# Patient Record
Sex: MALE | Race: WHITE | NOT HISPANIC OR LATINO | Employment: FULL TIME | URBAN - METROPOLITAN AREA
[De-identification: names, ages, dates, MRNs, and addresses within clinical notes are randomized per-mention and may not be internally consistent; named-entity substitution may affect disease eponyms.]

---

## 2018-12-26 ENCOUNTER — RECORDS - HEALTHEAST (OUTPATIENT)
Dept: LAB | Facility: CLINIC | Age: 23
End: 2018-12-26

## 2018-12-26 LAB
BASOPHILS # BLD AUTO: 0.1 THOU/UL (ref 0–0.2)
BASOPHILS NFR BLD AUTO: 1 % (ref 0–2)
EOSINOPHIL # BLD AUTO: 0.3 THOU/UL (ref 0–0.4)
EOSINOPHIL NFR BLD AUTO: 2 % (ref 0–6)
ERYTHROCYTE [DISTWIDTH] IN BLOOD BY AUTOMATED COUNT: 12.2 % (ref 11–14.5)
HCT VFR BLD AUTO: 45 % (ref 40–54)
HGB BLD-MCNC: 14.9 G/DL (ref 14–18)
LYMPHOCYTES # BLD AUTO: 2 THOU/UL (ref 0.8–4.4)
LYMPHOCYTES NFR BLD AUTO: 18 % (ref 20–40)
MCH RBC QN AUTO: 29.2 PG (ref 27–34)
MCHC RBC AUTO-ENTMCNC: 33.1 G/DL (ref 32–36)
MCV RBC AUTO: 88 FL (ref 80–100)
MONOCYTES # BLD AUTO: 1 THOU/UL (ref 0–0.9)
MONOCYTES NFR BLD AUTO: 9 % (ref 2–10)
NEUTROPHILS # BLD AUTO: 7.8 THOU/UL (ref 2–7.7)
NEUTROPHILS NFR BLD AUTO: 71 % (ref 50–70)
PLATELET # BLD AUTO: 287 THOU/UL (ref 140–440)
PMV BLD AUTO: 8.7 FL (ref 8.5–12.5)
RBC # BLD AUTO: 5.11 MILL/UL (ref 4.4–6.2)
WBC: 11.2 THOU/UL (ref 4–11)

## 2018-12-28 LAB — BACTERIA SPEC CULT: NORMAL

## 2019-03-05 ENCOUNTER — RECORDS - HEALTHEAST (OUTPATIENT)
Dept: LAB | Facility: CLINIC | Age: 24
End: 2019-03-05

## 2019-03-05 LAB
HGB BLD-MCNC: 14.2 G/DL (ref 14–18)
POTASSIUM BLD-SCNC: 4.2 MMOL/L (ref 3.5–5)

## 2022-01-24 ENCOUNTER — OFFICE VISIT (OUTPATIENT)
Dept: FAMILY MEDICINE | Facility: CLINIC | Age: 27
End: 2022-01-24
Payer: COMMERCIAL

## 2022-01-24 VITALS
RESPIRATION RATE: 16 BRPM | SYSTOLIC BLOOD PRESSURE: 146 MMHG | DIASTOLIC BLOOD PRESSURE: 78 MMHG | HEIGHT: 75 IN | OXYGEN SATURATION: 99 % | BODY MASS INDEX: 34.07 KG/M2 | TEMPERATURE: 97.1 F | WEIGHT: 274 LBS | HEART RATE: 90 BPM

## 2022-01-24 DIAGNOSIS — Z11.3 SCREEN FOR STD (SEXUALLY TRANSMITTED DISEASE): ICD-10-CM

## 2022-01-24 DIAGNOSIS — Z13.6 ENCOUNTER FOR LIPID SCREENING FOR CARDIOVASCULAR DISEASE: ICD-10-CM

## 2022-01-24 DIAGNOSIS — M25.50 ARTHRALGIA, UNSPECIFIED JOINT: ICD-10-CM

## 2022-01-24 DIAGNOSIS — Z13.220 ENCOUNTER FOR LIPID SCREENING FOR CARDIOVASCULAR DISEASE: ICD-10-CM

## 2022-01-24 DIAGNOSIS — Z00.00 HEALTH CARE MAINTENANCE: ICD-10-CM

## 2022-01-24 DIAGNOSIS — Z11.59 NEED FOR HEPATITIS C SCREENING TEST: ICD-10-CM

## 2022-01-24 DIAGNOSIS — Z23 NEED FOR 23-POLYVALENT PNEUMOCOCCAL POLYSACCHARIDE VACCINE: ICD-10-CM

## 2022-01-24 DIAGNOSIS — Z87.39 HISTORY OF GOUT: ICD-10-CM

## 2022-01-24 DIAGNOSIS — Z13.1 SCREENING FOR DIABETES MELLITUS: ICD-10-CM

## 2022-01-24 DIAGNOSIS — Z71.89 ADVANCED DIRECTIVES, COUNSELING/DISCUSSION: ICD-10-CM

## 2022-01-24 DIAGNOSIS — Z23 NEED FOR COVID-19 VACCINE: ICD-10-CM

## 2022-01-24 DIAGNOSIS — J45.40 MODERATE PERSISTENT ASTHMA WITHOUT COMPLICATION: ICD-10-CM

## 2022-01-24 DIAGNOSIS — R06.09 DYSPNEA ON EXERTION: ICD-10-CM

## 2022-01-24 DIAGNOSIS — Z23 NEED FOR TDAP VACCINATION: ICD-10-CM

## 2022-01-24 DIAGNOSIS — Z11.4 SCREENING FOR HIV (HUMAN IMMUNODEFICIENCY VIRUS): ICD-10-CM

## 2022-01-24 DIAGNOSIS — Z23 NEED FOR PROPHYLACTIC VACCINATION AND INOCULATION AGAINST INFLUENZA: ICD-10-CM

## 2022-01-24 DIAGNOSIS — R03.0 ELEVATED BLOOD PRESSURE READING WITHOUT DIAGNOSIS OF HYPERTENSION: ICD-10-CM

## 2022-01-24 DIAGNOSIS — Z00.00 ROUTINE HISTORY AND PHYSICAL EXAMINATION OF ADULT: Primary | ICD-10-CM

## 2022-01-24 DIAGNOSIS — Z82.69 FAMILY HISTORY OF GOUT: ICD-10-CM

## 2022-01-24 DIAGNOSIS — R41.840 DISTURBED CONCENTRATION: ICD-10-CM

## 2022-01-24 LAB
BASOPHILS # BLD AUTO: 0.1 10E3/UL (ref 0–0.2)
BASOPHILS NFR BLD AUTO: 1 %
EOSINOPHIL # BLD AUTO: 0.7 10E3/UL (ref 0–0.7)
EOSINOPHIL NFR BLD AUTO: 10 %
ERYTHROCYTE [DISTWIDTH] IN BLOOD BY AUTOMATED COUNT: 12.5 % (ref 10–15)
HBA1C MFR BLD: 5.4 % (ref 0–5.6)
HCT VFR BLD AUTO: 41.5 % (ref 40–53)
HCV AB SERPL QL IA: NONREACTIVE
HGB BLD-MCNC: 13.9 G/DL (ref 13.3–17.7)
HIV 1+2 AB+HIV1 P24 AG SERPL QL IA: NONREACTIVE
IMM GRANULOCYTES # BLD: 0 10E3/UL
IMM GRANULOCYTES NFR BLD: 0 %
LYMPHOCYTES # BLD AUTO: 2.3 10E3/UL (ref 0.8–5.3)
LYMPHOCYTES NFR BLD AUTO: 34 %
MCH RBC QN AUTO: 28.8 PG (ref 26.5–33)
MCHC RBC AUTO-ENTMCNC: 33.5 G/DL (ref 31.5–36.5)
MCV RBC AUTO: 86 FL (ref 78–100)
MONOCYTES # BLD AUTO: 0.6 10E3/UL (ref 0–1.3)
MONOCYTES NFR BLD AUTO: 9 %
NEUTROPHILS # BLD AUTO: 3.1 10E3/UL (ref 1.6–8.3)
NEUTROPHILS NFR BLD AUTO: 46 %
PLATELET # BLD AUTO: 275 10E3/UL (ref 150–450)
RBC # BLD AUTO: 4.82 10E6/UL (ref 4.4–5.9)
WBC # BLD AUTO: 6.7 10E3/UL (ref 4–11)

## 2022-01-24 PROCEDURE — 83036 HEMOGLOBIN GLYCOSYLATED A1C: CPT | Performed by: FAMILY MEDICINE

## 2022-01-24 PROCEDURE — 99214 OFFICE O/P EST MOD 30 MIN: CPT | Mod: 25 | Performed by: FAMILY MEDICINE

## 2022-01-24 PROCEDURE — 36415 COLL VENOUS BLD VENIPUNCTURE: CPT | Performed by: FAMILY MEDICINE

## 2022-01-24 PROCEDURE — 80061 LIPID PANEL: CPT | Performed by: FAMILY MEDICINE

## 2022-01-24 PROCEDURE — 87389 HIV-1 AG W/HIV-1&-2 AB AG IA: CPT | Performed by: FAMILY MEDICINE

## 2022-01-24 PROCEDURE — 82043 UR ALBUMIN QUANTITATIVE: CPT | Performed by: FAMILY MEDICINE

## 2022-01-24 PROCEDURE — 90732 PPSV23 VACC 2 YRS+ SUBQ/IM: CPT | Performed by: FAMILY MEDICINE

## 2022-01-24 PROCEDURE — 99395 PREV VISIT EST AGE 18-39: CPT | Mod: 25 | Performed by: FAMILY MEDICINE

## 2022-01-24 PROCEDURE — 86803 HEPATITIS C AB TEST: CPT | Performed by: FAMILY MEDICINE

## 2022-01-24 PROCEDURE — 90686 IIV4 VACC NO PRSV 0.5 ML IM: CPT | Performed by: FAMILY MEDICINE

## 2022-01-24 PROCEDURE — 84550 ASSAY OF BLOOD/URIC ACID: CPT | Performed by: FAMILY MEDICINE

## 2022-01-24 PROCEDURE — 90471 IMMUNIZATION ADMIN: CPT | Performed by: FAMILY MEDICINE

## 2022-01-24 PROCEDURE — 90472 IMMUNIZATION ADMIN EACH ADD: CPT | Performed by: FAMILY MEDICINE

## 2022-01-24 PROCEDURE — 80050 GENERAL HEALTH PANEL: CPT | Performed by: FAMILY MEDICINE

## 2022-01-24 RX ORDER — ALBUTEROL SULFATE 90 UG/1
2 AEROSOL, METERED RESPIRATORY (INHALATION) EVERY 6 HOURS PRN
Qty: 18 G | Refills: 0 | Status: SHIPPED | OUTPATIENT
Start: 2022-01-24 | End: 2022-02-18

## 2022-01-24 RX ORDER — ALBUTEROL SULFATE 90 UG/1
2 AEROSOL, METERED RESPIRATORY (INHALATION) EVERY 6 HOURS
COMMUNITY
End: 2022-01-24

## 2022-01-24 RX ORDER — INDOMETHACIN 25 MG/1
25 CAPSULE ORAL PRN
COMMUNITY
End: 2024-03-18

## 2022-01-24 ASSESSMENT — ENCOUNTER SYMPTOMS
ABDOMINAL PAIN: 0
NAUSEA: 0
JOINT SWELLING: 0
EYE PAIN: 0
DIARRHEA: 0
SHORTNESS OF BREATH: 1
MYALGIAS: 0
HEMATOCHEZIA: 0
HEADACHES: 0
SORE THROAT: 0
FREQUENCY: 0
DIZZINESS: 0
ARTHRALGIAS: 1
DYSURIA: 0
PARESTHESIAS: 0
HEMATURIA: 0
CONSTIPATION: 0
PALPITATIONS: 0
COUGH: 0
WEAKNESS: 0
FEVER: 0
NERVOUS/ANXIOUS: 0
CHILLS: 0
HEARTBURN: 0

## 2022-01-24 ASSESSMENT — MIFFLIN-ST. JEOR: SCORE: 2303.49

## 2022-01-24 ASSESSMENT — ASTHMA QUESTIONNAIRES: ACT_TOTALSCORE: 13

## 2022-01-24 NOTE — PATIENT INSTRUCTIONS
Good to meet you today   Seen for preventive health and additional concerns today   Self testicular check regularly   Labs today and will make further recommendations once reviewed  Health care maintenance reviewed.  Consider working on healthcare directives.  Flu shot due and given today.  Pneumovax given given today.  Clarify when last had Tdap and we can give at next appointment if needed.  Covid vaccine x3 up-to-date.    For history of asthma uncontrolled as ran out of controller meds 6 months ago with shortness of breath with activity limiting exercise, resume generic Advair/Wixela 250/51 puff twice a day.  Recheck asthma in 4 weeks if not better can do lung function test and refer to pulmonary.  If insurance does not cover above med let us know and asked them which one is covered.  Avoid vaping marijuana as this can be dangerous to your lungs.    Elevated blood pressure could be whitecoat hypertension could be due to weight.  Recommend low-salt diet, limiting alcohol to 1 drink a day no more than 7 total a week, 10% of total body weight loss, increasing aerobic activity, getting a home blood pressure machine and checking it at different times different days and calling us if it is consistently more than 130 x 80.    BMI 34.  Discussed diet, exercise weight loss, avoiding marijuana as much as possible as this can increase weight gain over time    History of gout diagnosed clinically based on toe pain in the past and family history of gout in dad.  Recent joint pain resolved.  Given frequent episodes in recent past you have made some lifestyle changes which is excellent.  Continue with low red meat, no game meat, no shellfish, decrease alcohol and will check uric acid levels and go from there.  If quite elevated consider a daily lowering medication.  Would avoid as needed prednisone as much as possible given long-term side effect can use indomethacin you have at home as needed.  Always try to use the lowest dose  for the shortest duration of time as Indocin like other NSAIDs increases risk of GI bleeding and heart attack.  Other option could be colchicine.  We can also consider referral to rheumatologist if necessary.    History of trouble focusing now affecting job referred to a psychologist for diagnostic evaluation of ADHD.  Avoid all marijuana prior to evaluation so they can get an unbiased evaluation.  Based on results can make recommendations for either skill base evaluation or referral to community psychiatrist for medication management.  Some medications like stimulants can make blood pressure worse so this might be tricky if your blood pressure remains elevated.    Return in 1 month for blood pressure check, asthma follow-up, follow-up from today including labs and in 1 year for preventive physical    Preventive Health Recommendations  Male Ages 26 - 39    Yearly exam:             See your health care provider every year in order to  o   Review health changes.   o   Discuss preventive care.    o   Review your medicines if your doctor has prescribed any.    You should be tested each year for STDs (sexually transmitted diseases), if you re at risk.     After age 35, talk to your provider about cholesterol testing. If you are at risk for heart disease, have your cholesterol tested at least every 5 years.     If you are at risk for diabetes, you should have a diabetes test (fasting glucose).  Shots: Get a flu shot each year. Get a tetanus shot every 10 years.     Nutrition:    Eat at least 5 servings of fruits and vegetables daily.     Eat whole-grain bread, whole-wheat pasta and brown rice instead of white grains and rice.     Get adequate Calcium and Vitamin D.     Lifestyle    Exercise for at least 150 minutes a week (30 minutes a day, 5 days a week). This will help you control your weight and prevent disease.     Limit alcohol to one drink per day.     No smoking.     Wear sunscreen to prevent skin cancer.     See  your dentist every six months for an exam and cleaning.     Patient Education     Testicular Self-Exam (TONY)  Testicular cancer is the most common form of cancer in men between the ages of 15 and 35. Most cases affect men under 55. It usually shows up as a painless lump in the testicle. The good news is that a simple monthly self-exam may help find trouble before it gets serious. When detected early, testicular cancer is almost 100% curable.       Doing your TONY  Do a TONY once a month, during or after a warm shower. The warm shower helps the scrotum relax. This makes the exam easier to do. Spend about 3 minutes to 5 minutes feeling for lumps, firm areas, or changes. If you do find a problem, don t panic. Call your healthcare provider and make an appointment.  Check the testicles  Hold your scrotum in the palm of your hand. Roll each testicle gently between the thumbs and fingers of both hands. Feel for changes in each testicle, one at a time.  Check the epididymis  The epididymis is a raised, rim-like structure responsible for storing sperm . It runs along the top and back of each testicle and often hurts when you press on it. Gently feel each epididymis for changes. A spermatocele is a cyst filled with fluid. It may be felt on or near the epididymis or testicle. Most often, spermatoceles are painless and not cancerous.   Check the vas deferens  The vas deferens is a little tube that runs up from the top of each testicle. A normal vas deferens feels like a firm piece of cooked spaghetti. Feel for changes above each testicle.  Professional screening  If you feel any abnormalities, tell your healthcare provider right away. In addition to doing your own TONY, you should also see your healthcare provider for regular checkups.  [employer] last reviewed this educational content on 6/1/2019 2000-2021 [employer]. All rights reserved. This information is not intended as a substitute for professional medical  care. Always follow your healthcare professional's instructions.

## 2022-01-24 NOTE — LETTER
My Asthma Action Plan    Name: David Martin   YOB: 1995  Date: 1/24/2022   My doctor: Mariana Sherman MD   My clinic: [facility]        My Control Medicine: Fluticasone propionate + salmeterol (Advair Diskus or Wixela Inhub) -  250/50 mcg 1 puff twice a day  My Rescue Medicine: Albuterol (Proair/Ventolin/Proventil HFA) 2-4 puffs EVERY 4 HOURS as needed. Use a spacer if recommended by your provider.   My Asthma Severity:   Moderate Persistent  Know your asthma triggers: exercise or sports               GREEN ZONE   Good Control    I feel good    No cough or wheeze    Can work, sleep and play without asthma symptoms       Take your asthma control medicine every day.     1. If exercise triggers your asthma, take your rescue medication    15 minutes before exercise or sports, and    During exercise if you have asthma symptoms  2. Spacer to use with inhaler: If you have a spacer, make sure to use it with your inhaler             YELLOW ZONE Getting Worse  I have ANY of these:    I do not feel good    Cough or wheeze    Chest feels tight    Wake up at night   1. Keep taking your Green Zone medications  2. Start taking your rescue medicine:    every 20 minutes for up to 1 hour. Then every 4 hours for 24-48 hours.  3. If you stay in the Yellow Zone for more than 12-24 hours, contact your doctor.  4. If you do not return to the Green Zone in 12-24 hours or you get worse, start taking your oral steroid medicine if prescribed by your provider.           RED ZONE Medical Alert - Get Help  I have ANY of these:    I feel awful    Medicine is not helping    Breathing getting harder    Trouble walking or talking    Nose opens wide to breathe       1. Take your rescue medicine NOW  2. If your provider has prescribed an oral steroid medicine, start taking it NOW  3. Call your doctor NOW  4. If you are still in the Red Zone after 20 minutes and you have not reached your doctor:    Take your  rescue medicine again and    Call 911 or go to the emergency room right away    See your regular doctor within 2 weeks of an Emergency Room or Urgent Care visit for follow-up treatment.          Annual Reminders:  Meet with Asthma Educator,  Flu Shot in the Fall, consider Pneumonia Vaccination for patients with asthma (aged 19 and older).    Pharmacy: [id number] - [place], [place] - [address] AT [facility] [address]    Electronically signed by Mariana Sherman MD   Date: 01/24/22                      Asthma Triggers  How To Control Things That Make Your Asthma Worse    Triggers are things that make your asthma worse.  Look at the list below to help you find your triggers and what you can do about them.  You can help prevent asthma flare-ups by staying away from your triggers.      Trigger                                                          What you can do   Cigarette Smoke  Tobacco smoke can make asthma worse. Do not allow smoking in your home, car or around you.  Be sure no one smokes at a child s day care or school.  If you smoke, ask your health care provider for ways to help you quit.  Ask family members to quit too.  Ask your health care provider for a referral to Quit Plan to help you quit smoking, or call 8-056-794-PLAN.     Colds, Flu, Bronchitis  These are common triggers of asthma. Wash your hands often.  Don t touch your eyes, nose or mouth.  Get a flu shot every year.     Dust Mites  These are tiny bugs that live in cloth or carpet. They are too small to see. Wash sheets and blankets in hot water every week.   Encase pillows and mattress in dust mite proof covers.  Avoid having carpet if you can. If you have carpet, vacuum weekly.   Use a dust mask and HEPA vacuum.   Pollen and Outdoor Mold  Some people are allergic to trees, grass, or weed pollen, or molds. Try to keep your windows closed.  Limit time out doors when pollen count is high.   Ask you health care provider about  taking medicine during allergy season.     Animal Dander  Some people are allergic to skin flakes, urine or saliva from pets with fur or feathers. Keep pets with fur or feathers out of your home.    If you can t keep the pet outdoors, then keep the pet out of your bedroom.  Keep the bedroom door closed.  Keep pets off cloth furniture and away from stuffed toys.     Mice, Rats, and Cockroaches   Some people are allergic to the waste from these pests.   Cover food and garbage.  Clean up spills and food crumbs.  Store grease in the refrigerator.   Keep food out of the bedroom.   Indoor Mold  This can be a trigger if your home has high moisture. Fix leaking faucets, pipes, or other sources of water.   Clean moldy surfaces.  Dehumidify basement if it is damp and smelly.   Smoke, Strong Odors, and Sprays  These can reduce air quality. Stay away from strong odors and sprays, such as perfume, powder, hair spray, paints, smoke incense, paint, cleaning products, candles and new carpet.   Exercise or Sports  Some people with asthma have this trigger. Be active!  Ask your doctor about taking medicine before sports or exercise to prevent symptoms.    Warm up for 5-10 minutes before and after sports or exercise.     Other Triggers of Asthma  Cold air:  Cover your nose and mouth with a scarf.  Sometimes laughing or crying can be a trigger.  Some medicines and food can trigger asthma.

## 2022-01-24 NOTE — RESULT ENCOUNTER NOTE
Results within acceptable limits.  -Hepatitis C antibody screen test shows no signs of a previous hepatitis C infection.  -HIV test is normal..

## 2022-01-24 NOTE — LETTER
February 5, 2022      Davidalbert Martin  [address] [facility] [address]  [place] [address]        Dear ,    We are writing to inform you of your test results.    Results within acceptable limits.  -Microalbumin (urine protein) test is normal.  ADVISE: rechecking this annually.    Resulted Orders   HIV Antigen Antibody Combo   Result Value Ref Range    HIV Antigen Antibody Combo Nonreactive Nonreactive      Comment:      HIV-1 p24 Ag & HIV-1/HIV-2 Ab Not Detected   Hepatitis C Screen Reflex to HCV RNA Quant and Genotype   Result Value Ref Range    Hepatitis C Antibody Nonreactive Nonreactive    Narrative    Assay performance characteristics have not been established for newborns, infants, and children.   Hemoglobin A1c   Result Value Ref Range    Hemoglobin A1C 5.4 0.0 - 5.6 %      Comment:      Normal <5.7%   Prediabetes 5.7-6.4%    Diabetes 6.5% or higher     Note: Adopted from ADA consensus guidelines.   Albumin Random Urine Quantitative with Creat Ratio   Result Value Ref Range    Creatinine Urine mg/dL 130 mg/dL    Albumin Urine mg/L 12 mg/L    Albumin Urine mg/g Cr 9.23 0.00 - 17.00 mg/g Cr   CBC with platelets and differential   Result Value Ref Range    WBC Count 6.7 4.0 - 11.0 10e3/uL    RBC Count 4.82 4.40 - 5.90 10e6/uL    Hemoglobin 13.9 13.3 - 17.7 g/dL    Hematocrit 41.5 40.0 - 53.0 %    MCV 86 78 - 100 fL    MCH 28.8 26.5 - 33.0 pg    MCHC 33.5 31.5 - 36.5 g/dL    RDW 12.5 10.0 - 15.0 %    Platelet Count 275 150 - 450 10e3/uL    % Neutrophils 46 %    % Lymphocytes 34 %    % Monocytes 9 %    % Eosinophils 10 %    % Basophils 1 %    % Immature Granulocytes 0 %    Absolute Neutrophils 3.1 1.6 - 8.3 10e3/uL    Absolute Lymphocytes 2.3 0.8 - 5.3 10e3/uL    Absolute Monocytes 0.6 0.0 - 1.3 10e3/uL    Absolute Eosinophils 0.7 0.0 - 0.7 10e3/uL    Absolute Basophils 0.1 0.0 - 0.2 10e3/uL    Absolute Immature Granulocytes 0.0 <=0.4 10e3/uL       If you have any questions or concerns, please call the clinic  at the number listed above.       Sincerely,      Mariana Sherman MD / BEBE

## 2022-01-24 NOTE — LETTER
February 5, 2022      Davidalbert Martin  [address] [facility] [address]  [place] [address]        Dear ,    We are writing to inform you of your test results.    Results within acceptable limits.     Normal red blood cell (hgb) levels, normal white blood cell count and normal platelet levels.     If you sign up for my chart we will be able to send your results directly to you in real-time as addressed and you can also send us questions or update your immunization and send us messages regarding that via [employer].     Resulted Orders   HIV Antigen Antibody Combo   Result Value Ref Range    HIV Antigen Antibody Combo Nonreactive Nonreactive      Comment:      HIV-1 p24 Ag & HIV-1/HIV-2 Ab Not Detected   Hepatitis C Screen Reflex to HCV RNA Quant and Genotype   Result Value Ref Range    Hepatitis C Antibody Nonreactive Nonreactive    Narrative    Assay performance characteristics have not been established for newborns, infants, and children.   Hemoglobin A1c   Result Value Ref Range    Hemoglobin A1C 5.4 0.0 - 5.6 %      Comment:      Normal <5.7%   Prediabetes 5.7-6.4%    Diabetes 6.5% or higher     Note: Adopted from ADA consensus guidelines.   Albumin Random Urine Quantitative with Creat Ratio   Result Value Ref Range    Creatinine Urine mg/dL 130 mg/dL    Albumin Urine mg/L 12 mg/L    Albumin Urine mg/g Cr 9.23 0.00 - 17.00 mg/g Cr   CBC with platelets and differential   Result Value Ref Range    WBC Count 6.7 4.0 - 11.0 10e3/uL    RBC Count 4.82 4.40 - 5.90 10e6/uL    Hemoglobin 13.9 13.3 - 17.7 g/dL    Hematocrit 41.5 40.0 - 53.0 %    MCV 86 78 - 100 fL    MCH 28.8 26.5 - 33.0 pg    MCHC 33.5 31.5 - 36.5 g/dL    RDW 12.5 10.0 - 15.0 %    Platelet Count 275 150 - 450 10e3/uL    % Neutrophils 46 %    % Lymphocytes 34 %    % Monocytes 9 %    % Eosinophils 10 %    % Basophils 1 %    % Immature Granulocytes 0 %    Absolute Neutrophils 3.1 1.6 - 8.3 10e3/uL    Absolute Lymphocytes 2.3 0.8 - 5.3 10e3/uL    Absolute  Monocytes 0.6 0.0 - 1.3 10e3/uL    Absolute Eosinophils 0.7 0.0 - 0.7 10e3/uL    Absolute Basophils 0.1 0.0 - 0.2 10e3/uL    Absolute Immature Granulocytes 0.0 <=0.4 10e3/uL       If you have any questions or concerns, please call the clinic at the number listed above.       Sincerely,      Mariana Sherman MD / BEBE

## 2022-01-24 NOTE — LETTER
February 5, 2022      David Martin  [address] [facility] [address]  [place] [address]        Dear ,    We are writing to inform you of your test results.    Results within acceptable limits.      Hepatitis C antibody screen test shows no signs of a previous hepatitis C infection.     HIV test is normal..     Resulted Orders   HIV Antigen Antibody Combo   Result Value Ref Range    HIV Antigen Antibody Combo Nonreactive Nonreactive      Comment:      HIV-1 p24 Ag & HIV-1/HIV-2 Ab Not Detected   Hepatitis C Screen Reflex to HCV RNA Quant and Genotype   Result Value Ref Range    Hepatitis C Antibody Nonreactive Nonreactive    Narrative    Assay performance characteristics have not been established for newborns, infants, and children.   Hemoglobin A1c   Result Value Ref Range    Hemoglobin A1C 5.4 0.0 - 5.6 %      Comment:      Normal <5.7%   Prediabetes 5.7-6.4%    Diabetes 6.5% or higher     Note: Adopted from ADA consensus guidelines.   Albumin Random Urine Quantitative with Creat Ratio   Result Value Ref Range    Creatinine Urine mg/dL 130 mg/dL    Albumin Urine mg/L 12 mg/L    Albumin Urine mg/g Cr 9.23 0.00 - 17.00 mg/g Cr   CBC with platelets and differential   Result Value Ref Range    WBC Count 6.7 4.0 - 11.0 10e3/uL    RBC Count 4.82 4.40 - 5.90 10e6/uL    Hemoglobin 13.9 13.3 - 17.7 g/dL    Hematocrit 41.5 40.0 - 53.0 %    MCV 86 78 - 100 fL    MCH 28.8 26.5 - 33.0 pg    MCHC 33.5 31.5 - 36.5 g/dL    RDW 12.5 10.0 - 15.0 %    Platelet Count 275 150 - 450 10e3/uL    % Neutrophils 46 %    % Lymphocytes 34 %    % Monocytes 9 %    % Eosinophils 10 %    % Basophils 1 %    % Immature Granulocytes 0 %    Absolute Neutrophils 3.1 1.6 - 8.3 10e3/uL    Absolute Lymphocytes 2.3 0.8 - 5.3 10e3/uL    Absolute Monocytes 0.6 0.0 - 1.3 10e3/uL    Absolute Eosinophils 0.7 0.0 - 0.7 10e3/uL    Absolute Basophils 0.1 0.0 - 0.2 10e3/uL    Absolute Immature Granulocytes 0.0 <=0.4 10e3/uL       If you have any questions or  concerns, please call the clinic at the number listed above.       Sincerely,      Mariana Sherman MD / BEBE

## 2022-01-24 NOTE — PROGRESS NOTES
SUBJECTIVE:   CC: David Martin is an 26 year old male who presents for preventative health visit.     Patient has been advised of split billing requirements and indicates understanding: Yes  Healthy Habits:     Getting at least 3 servings of Calcium per day:  Yes    Bi-annual eye exam:  Yes    Dental care twice a year:  Yes    Sleep apnea or symptoms of sleep apnea:  None    Diet:  Other    Frequency of exercise:  2-3 days/week    Duration of exercise:  15-30 minutes    Taking medications regularly:  Yes    Medication side effects:  None    PHQ-2 Total Score: 0    Additional concerns today:  No    26-year-old gentleman new to this clinic and this provider.  Limited records in Care Everywhere and in epic.  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  Seen once in [place] on 12/6/2021 in urgent care for left toe pain and prior history of gout and given prednisone for 5 days.  Allergy to minocycline, noted while being treated for acne as a teenager developed a rash while on it and never prescribed again.  [place]  negative.  Grew up in [place] only child of his parents, then went to college in [place].  Met his girlfriend there and she was from [place] and they moved to [place] as her mother was diagnosed with cancer and in the process they got good job s & Educational opportunities & stayed on, He has been in [place] now since 2018.  He got a new job in June 2021 and got insurance and after that it took him about 3 months to get to this appointment today.    Here for a preventive physical and establish care and to discuss his concerns.  Reports no genitourinary concerns.  Health care maintenance reviewed.  Does not have healthcare directives.  Honoring choices  given.  Agreeable to flu shot today.  Reports COVID booster x3 up-to-date and confirmed on RACHEL.  Thinks his Tdap may be up-to-date will check his records and get back to us about it.  Agreeable to getting pneumonia vaccine given asthma history.  We will do blood work today.  Declines need for STD testing given with the same girl past 8 years in a monogamous relationship.    History of asthma.  Diagnosed as a child in middle school.  Was on Advair as a child.  Off it in college when he lost a bunch of weight and overall got much healthier and majority of his asthma symptoms went away.  Main trigger is exercise.  Moved from [place] after college and gained weight and started to have asthma symptoms again.  Since being in [place] past 4 years he did get back on preventative Wixela as that was the only version insurance would cover.  Was on it for few years but ran out of it 6 months ago.  Now more winded with activity since off the medication.  And has been using his rescue albuterol inhaler twice a day and ran out of that as well and needs a refill.  The humidity and allergies in [place] made things worse.  When he [facility] in [place] it helps and he breathes better.  ACT score today only 13.  Has never been hospitalized for asthma.    Elevated BP, in past told white coat syndrome. One doctor was concerned advise he do home blood pressure checks.  But he did not ever start that.    BMI is now more than 34.  Gained weight since college.  He is more active in the summertime.  Likes to hike. Does a big [employer] trip in [place] at least once yearly and also does backpacking in the [facility].  Is more active in the summertime.  Recently took up fly fishing.    Notes has a history of gout.  Around the age of 22-23 he had severe pain with swelling and redness.  He initially thought he sprained it.  Then when symptoms persisted he googled them and realized it could be gout.  In talking to his father  at that time his father informed him that he also had a history of gout.  He ended up seeing his primary care provider [place] and was given a diagnosis of gout based on symptoms.  Was given indomethacin to use.  Knows it is an anti-inflammatory.  Sometimes like in the most recent past he has had prednisone prescribed.  He went a few years without a flare up till December 2021.  Then got a bad attack left toe and as soon as that cleared up got a bad attack in his right toe.  He was used to them clearing up after 5 to 6 days but the one in his right foot lasted almost 3 weeks.  He knows this is also possible but was unusual for him.  He never looked into the evaluation of his gout in more detail in the past but would be interested in getting blood work today, blood for uric acid etc.  During the years that he did not have much flare ups he got a little bit lax and avoiding triggers.  But since the 2 recent attacks he has avoided all beer and only drinks wine or cider in moderation as those have been less triggering to him in the past.  Recently has started to avoid all shellfish and has significantly reduced his red meat and currently the pain is much better.    He has had prior grade 5 AC joint separation left shoulder following an injury repaired in 2017 surgically.    Another thing he is concerned about is a difficult time focusing.  He feels he has been like this all his life but has never really talked about it.  He recalls teachers suggesting to his parents while he was in school about his trouble focusing.  It is hard for him to remember if symptoms started before the age of 11 but he clearly remembers while in high school it took him 5 to 8 hours to complete his homework compared to others in order to do a good job.  He thinks his parents never elevated this to see anyone as his symptoms did not affect his grades. It just took him longer to do the work.  Similar occurrence in college, he was able to get  good grades by working longer.  And there were no detrimental effects or outcomes. In his prior job it was less taxing and he could offset the lack of focus by working longer but since he switched jobs in 2021 he has noticed that the job is significantly more demanding.  He is a  and writes a lot of code and the job can be taxing and he gets distracted easily and falls behind and then starts procrastinating. Now since he is an adult he is wondering if he can bring it up and see if he could get help if he needs it.     Today's PHQ-2 Score:   PHQ-2 (  [employer]) 2022   Q1: Little interest or pleasure in doing things 0   Q2: Feeling down, depressed or hopeless 0   PHQ-2 Score 0   Q1: Little interest or pleasure in doing things Not at all   Q2: Feeling down, depressed or hopeless Not at all   PHQ-2 Score 0     Abuse: Current or Past(Physical, Sexual or Emotional)- No  Do you feel safe in your environment? Yes    Social History     Tobacco Use     Smoking status: Never Smoker     Smokeless tobacco: Never Used   Substance Use Topics     Alcohol use: Yes     If you drink alcohol do you typically have >3 drinks per day or >7 drinks per week? No    Alcohol Use 2022   Prescreen: >3 drinks/day or >7 drinks/week? No   Prescreen: >3 drinks/day or >7 drinks/week? -   No flowsheet data found.    Last PSA: No results found for: PSA    Reviewed orders with patient. Reviewed health maintenance and updated orders accordingly - Yes  Lab work is in process  Labs reviewed in EPIC  BP Readings from Last 3 Encounters:   22 (!) 146/78    Wt Readings from Last 3 Encounters:   22 124.3 kg (274 lb)                  Patient Active Problem List   Diagnosis     Elevated blood pressure reading without diagnosis of hypertension     BMI 34.0-34.9,adult     Moderate persistent asthma without complication     History of gout     Past Surgical History:   Procedure Laterality Date      CIRCUMCISION        SHOULDER SURGERY Left 2017    left shoulder AC grade 5 separation repair     WISDOM TOOTH EXTRACTION         Social History     Tobacco Use     Smoking status: Never Smoker     Smokeless tobacco: Never Used   Substance Use Topics     Alcohol use: Yes     Family History   Problem Relation Age of Onset     Gout Father      Aortic Valve Replacement Father      Kidney Disease Maternal Grandmother         had one kidney removed du eto disease     Breast Cancer Maternal Aunt 40         Current Outpatient Medications   Medication Sig Dispense Refill     albuterol (PROAIR HFA/PROVENTIL HFA/VENTOLIN HFA) 108 (90 Base) MCG/ACT inhaler Inhale 2 puffs into the lungs every 6 hours as needed for shortness of breath / dyspnea or wheezing 18 g 0     fluticasone-salmeterol (ADVAIR) 250-50 MCG/DOSE inhaler Inhale 1 puff into the lungs every 12 hours 1 each 3     indomethacin (INDOCIN) 25 MG capsule Take 25 mg by mouth as needed       Allergies   Allergen Reactions     Minocycline Rash     GIVEN FOR ACNE WHEN A TEENAGER AND BROKE OUT IN [place]     Recent Labs   Lab Test 22  1242 19  1426   A1C 5.4  --    POTASSIUM  --  4.2        Reviewed and updated as needed this visit by clinical staff  Tobacco  Allergies  Meds  Problems  Med Hx  Surg Hx  Fam Hx  Soc Hx         Reviewed and updated as needed this visit by Provider  Tobacco  Allergies  Meds  Problems  Med Hx  Surg Hx  Fam Hx  Soc Hx        Past Medical History:   Diagnosis Date     Chronic idiopathic gout involving toe without tophus     dx age 22 toe swleling,     Moderate persistent asthma without complication     dx in middle school      Past Surgical History:   Procedure Laterality Date      CIRCUMCISION       SHOULDER SURGERY Left 2017    left shoulder AC grade 5 separation repair     WISDOM TOOTH EXTRACTION       OB History   No obstetric history on file.       Review of Systems   Constitutional: Negative for chills and fever.   HENT: Negative  "for congestion, ear pain, hearing loss and sore throat.    Eyes: Negative for pain and visual disturbance.   Respiratory: Positive for shortness of breath. Negative for cough.    Cardiovascular: Negative for chest pain, palpitations and peripheral edema.   Gastrointestinal: Negative for abdominal pain, constipation, diarrhea, heartburn, hematochezia and nausea.   Genitourinary: Negative for dysuria, frequency, genital sores, hematuria, impotence, penile discharge and urgency.   Musculoskeletal: Positive for arthralgias. Negative for joint swelling and myalgias.   Skin: Negative for rash.   Neurological: Negative for dizziness, weakness, headaches and paresthesias.   Psychiatric/Behavioral: Negative for mood changes. The patient is not nervous/anxious.      OBJECTIVE:   BP (!) 146/78   Pulse 90   Temp 97.1  F (36.2  C) (Temporal)   Resp 16   Ht 1.905 m (6' 3\")   Wt 124.3 kg (274 lb)   SpO2 99%   BMI 34.25 kg/m      Physical Exam  GENERAL: healthy, alert, no distress and obese  EYES: Eyes grossly normal to inspection, PERRL and conjunctivae and sclerae normal  HENT: ear canals and TM's normal, nose and mouth without ulcers or lesions  NECK: no adenopathy, no asymmetry, masses, or scars and thyroid normal to palpation  RESP: lungs clear to auscultation - no rales, rhonchi or wheezes  CV: regular rate and rhythm, normal S1 S2, no S3 or S4, no murmur, click or rub, no peripheral edema and peripheral pulses strong  ABDOMEN: soft, non tender, no hepatosplenomegaly, no masses and bowel sounds normal   (male): normal male genitalia without lesions or urethral discharge, no hernia, circumcised  MS: no gross musculoskeletal defects noted, no edema  SKIN: no suspicious lesions or rashes  NEURO: Normal strength and tone, mentation intact and speech normal  PSYCH: mentation appears normal, affect normal/bright, anxious, judgement and insight intact and appearance well groomed    Diagnostic Test Results:  Labs reviewed in " Epic  Results for orders placed or performed in visit on 01/24/22 (from the past 24 hour(s))   CBC with platelets and differential    Narrative    The following orders were created for panel order CBC with platelets and differential.  Procedure                               Abnormality         Status                     ---------                               -----------         ------                     CBC with platelets and d...[[id number]]                      Final result                 Please view results for these tests on the individual orders.   HIV Antigen Antibody Combo   Result Value Ref Range    HIV Antigen Antibody Combo Nonreactive Nonreactive   Hepatitis C Screen Reflex to HCV RNA Quant and Genotype   Result Value Ref Range    Hepatitis C Antibody Nonreactive Nonreactive    Narrative    Assay performance characteristics have not been established for newborns, infants, and children.   Hemoglobin A1c   Result Value Ref Range    Hemoglobin A1C 5.4 0.0 - 5.6 %   CBC with platelets and differential   Result Value Ref Range    WBC Count 6.7 4.0 - 11.0 10e3/uL    RBC Count 4.82 4.40 - 5.90 10e6/uL    Hemoglobin 13.9 13.3 - 17.7 g/dL    Hematocrit 41.5 40.0 - 53.0 %    MCV 86 78 - 100 fL    MCH 28.8 26.5 - 33.0 pg    MCHC 33.5 31.5 - 36.5 g/dL    RDW 12.5 10.0 - 15.0 %    Platelet Count 275 150 - 450 10e3/uL    % Neutrophils 46 %    % Lymphocytes 34 %    % Monocytes 9 %    % Eosinophils 10 %    % Basophils 1 %    % Immature Granulocytes 0 %    Absolute Neutrophils 3.1 1.6 - 8.3 10e3/uL    Absolute Lymphocytes 2.3 0.8 - 5.3 10e3/uL    Absolute Monocytes 0.6 0.0 - 1.3 10e3/uL    Absolute Eosinophils 0.7 0.0 - 0.7 10e3/uL    Absolute Basophils 0.1 0.0 - 0.2 10e3/uL    Absolute Immature Granulocytes 0.0 <=0.4 10e3/uL       ASSESSMENT/PLAN:       ICD-10-CM    1. Routine history and physical examination of adult  Z00.00 INFLUENZA VACCINE IM >6 MO VALENT IIV4 (ALFURIA/FLUZONE)     Comprehensive metabolic panel  (BMP + Alb, Alk Phos, ALT, AST, Total. Bili, TP)     Lipid Profile (Chol, Trig, HDL, LDL calc)     HIV Antigen Antibody Combo     Hepatitis C Screen Reflex to HCV RNA Quant and Genotype     Hemoglobin A1c     Pneumococcal vaccine 23 valent PPSV23  (Pneumovax) [04109]     Comprehensive metabolic panel (BMP + Alb, Alk Phos, ALT, AST, Total. Bili, TP)     Lipid Profile (Chol, Trig, HDL, LDL calc)     HIV Antigen Antibody Combo     Hepatitis C Screen Reflex to HCV RNA Quant and Genotype     Hemoglobin A1c   2. Moderate persistent asthma without complication  J45.40 albuterol (PROAIR HFA/PROVENTIL HFA/VENTOLIN HFA) 108 (90 Base) MCG/ACT inhaler     fluticasone-salmeterol (ADVAIR) 250-50 MCG/DOSE inhaler     Pneumococcal vaccine 23 valent PPSV23  (Pneumovax) [61521]   3. Dyspnea on exertion  R06.00    4. Elevated blood pressure reading without diagnosis of hypertension  R03.0 Comprehensive metabolic panel (BMP + Alb, Alk Phos, ALT, AST, Total. Bili, TP)     TSH with free T4 reflex     Albumin Random Urine Quantitative with Creat Ratio     Comprehensive metabolic panel (BMP + Alb, Alk Phos, ALT, AST, Total. Bili, TP)     TSH with free T4 reflex     Albumin Random Urine Quantitative with Creat Ratio   5. BMI 34.0-34.9,adult  Z68.34    6. History of gout  Z87.39 CBC with platelets and differential     TSH with free T4 reflex     Uric acid     CBC with platelets and differential     TSH with free T4 reflex     Uric acid   7. Family history of gout  Z82.69    8. Arthralgia, unspecified joint  M25.50    9. Disturbed concentration  R41.840 TSH with free T4 reflex     Adult Mental Health  Referral     TSH with free T4 reflex   10. Health care maintenance  Z00.00 REVIEW OF HEALTH MAINTENANCE PROTOCOL ORDERS   11. Advanced directives, counseling/discussion  Z71.89    12. Need for prophylactic vaccination and inoculation against influenza  Z23 INFLUENZA VACCINE IM >6 MO VALENT IIV4 (ALFURIA/FLUZONE)   13. Need for COVID-19  vaccine  Z23    14. Need for Tdap vaccination  Z23    15. Need for 23-polyvalent pneumococcal polysaccharide vaccine  Z23 Pneumococcal vaccine 23 valent PPSV23  (Pneumovax) [57023]   16. Screening for diabetes mellitus  Z13.1 Comprehensive metabolic panel (BMP + Alb, Alk Phos, ALT, AST, Total. Bili, TP)     Hemoglobin A1c     Comprehensive metabolic panel (BMP + Alb, Alk Phos, ALT, AST, Total. Bili, TP)     Hemoglobin A1c   17. Encounter for lipid screening for cardiovascular disease  Z13.220 Lipid Profile (Chol, Trig, HDL, LDL calc)    Z13.6 Lipid Profile (Chol, Trig, HDL, LDL calc)   18. Screening for HIV (human immunodeficiency virus)  Z11.4 HIV Antigen Antibody Combo     HIV Antigen Antibody Combo   19. Need for hepatitis C screening test  Z11.59 Hepatitis C Screen Reflex to HCV RNA Quant and Genotype     Hepatitis C Screen Reflex to HCV RNA Quant and Genotype   20. Screen for STD (sexually transmitted disease)  Z11.3 HIV Antigen Antibody Combo     Hepatitis C Screen Reflex to HCV RNA Quant and Genotype     HIV Antigen Antibody Combo     Hepatitis C Screen Reflex to HCV RNA Quant and Genotype     Seen to establish care and for preventive health and additional concerns today   Self testicular check regularly   Labs today and will make further recommendations once reviewed.  We will check HIV and hepatitis C per preventative guidelines.  Rest of STD not indicated given in a monogamous relationship.  Health care maintenance reviewed.  Consider working on healthcare directives.  And honoring choices given to review  Flu shot due and given today.  Pneumovax 23 due &given today.  He is to clarify when he last had Tdap and we can give at next appointment if needed.  Covid vaccine x3 up-to-date.    For history of asthma uncontrolled as ran out of controller meds 6 months ago with shortness of breath with activity limiting exercise, will resume generic Advair/Wixela 250/50, 1 puff twice a day. Recheck asthma in 4 weeks &  if not better can do lung function tests and refer to pulmonary. If insurance does not cover above med to let us know and ask them which one is covered.encouraged to avoid vaping marijuana as this can be dangerous to his lungs.  Return in 4 weeks to follow-up on asthma.    Elevated blood pressure could be white coat hypertension & or could be due to weight.  Recommend low-salt diet, limiting alcohol to 1 drink a day no more than 7 total a week, 10% of total body weight loss, increasing aerobic activity, getting a home blood pressure machine and checking it at different times different days and calling us if it is consistently more than 130 x 80.  If persistently elevated despite lifestyle changes then will probably look into medications.  Return in 4 weeks to follow-up on blood pressure.    BMI 34.  Discussed diet, exercise weight loss, avoiding marijuana as much as possible as this can increase weight gain over time    History of gout diagnosed clinically based on toe pain in the past and family history of gout in dad.  Recent joint pain resolved.  Given frequent episodes in recent past he has made some lifestyle changes which is excellent.  Continue with low red meat, no game meat, no shellfish, decrease alcohol and will check uric acid levels and go from there.  If quite elevated consider a daily lowering medication.  May drink some tart cherry that is unsweetened and or eat cherries.  Would avoid as needed prednisone as much as possible given long-term side effect (can worsen weight gain increases risk of diabetes, ulcers), may use indomethacin he has at home as needed.  Always try to use the lowest dose for the shortest duration of time as Indocin like other NSAID's increases risk of GI bleeding and heart attack.  Other options could be colchicine prn.  Side effects would be nausea vomiting diarrhea etc.  We can also consider referral to rheumatologist if necessary.    History of trouble focusing now affecting  "job: referred to a psychologist for diagnostic evaluation of ADHD.  Avoid all marijuana prior to evaluation so they can get an unbiased evaluation.  Based on results can make recommendations for either skill base evaluation/treatment and/or referral to a community psychiatrist for medication management.  Some medications like stimulants can make blood pressure worse so this might be tricky if his blood pressure remains elevated.    Return in 1 month for blood pressure check, asthma follow-up, & follow-up from today including labs and in 1 year for preventive physical    Patient has been advised of split billing requirements and indicates understanding: Yes    COUNSELING:   Reviewed preventive health counseling, as reflected in patient instructions       Regular exercise       Healthy diet/nutrition       Vision screening       Immunizations    Vaccinated for: Influenza and Pneumococcal         Alcohol Use        Consider Hep C screening for all patients one time for ages 18-79 years       HIV screenin x in teen years, 1 x in adult years, and at intervals if high risk       The ASCVD Risk score (Antonio LI Jr., et al., 2013) failed to calculate for the following reasons:    The 2013 ASCVD risk score is only valid for ages 40 to 79       Advance Care Planning    Estimated body mass index is 34.25 kg/m  as calculated from the following:    Height as of this encounter: 1.905 m (6' 3\").    Weight as of this encounter: 124.3 kg (274 lb).     Weight management plan: Discussed healthy diet and exercise guidelines    He reports that he has never smoked. He has never used smokeless tobacco.      Counseling Resources:  ATP IV Guidelines  Pooled Cohorts Equation Calculator  FRAX Risk Assessment  ICSI Preventive Guidelines  Dietary Guidelines for Americans,   USDA's MyPlate  ASA Prophylaxis  Lung CA Screening    Mariana Sherman MD  [facility]  "

## 2022-01-24 NOTE — RESULT ENCOUNTER NOTE
Results within acceptable limits.  -Normal red blood cell (hgb) levels, normal white blood cell count and normal platelet levels.  If you sign up for my chart we will be able to send your results directly to you in real-time as addressed and you can also send us questions or update your immunization and send us messages regarding that via "[employer].

## 2022-01-24 NOTE — LETTER
February 5, 2022      David Martin  [address] [facility] [address] [facility] [address]  [place] [address]        Dear ,    We are writing to inform you of your test results.    HBA1c normal. No Diabetes     Resulted Orders   HIV Antigen Antibody Combo   Result Value Ref Range    HIV Antigen Antibody Combo Nonreactive Nonreactive      Comment:      HIV-1 p24 Ag & HIV-1/HIV-2 Ab Not Detected   Hepatitis C Screen Reflex to HCV RNA Quant and Genotype   Result Value Ref Range    Hepatitis C Antibody Nonreactive Nonreactive    Narrative    Assay performance characteristics have not been established for newborns, infants, and children.   Hemoglobin A1c   Result Value Ref Range    Hemoglobin A1C 5.4 0.0 - 5.6 %      Comment:      Normal <5.7%   Prediabetes 5.7-6.4%    Diabetes 6.5% or higher     Note: Adopted from ADA consensus guidelines.   Albumin Random Urine Quantitative with Creat Ratio   Result Value Ref Range    Creatinine Urine mg/dL 130 mg/dL    Albumin Urine mg/L 12 mg/L    Albumin Urine mg/g Cr 9.23 0.00 - 17.00 mg/g Cr   CBC with platelets and differential   Result Value Ref Range    WBC Count 6.7 4.0 - 11.0 10e3/uL    RBC Count 4.82 4.40 - 5.90 10e6/uL    Hemoglobin 13.9 13.3 - 17.7 g/dL    Hematocrit 41.5 40.0 - 53.0 %    MCV 86 78 - 100 fL    MCH 28.8 26.5 - 33.0 pg    MCHC 33.5 31.5 - 36.5 g/dL    RDW 12.5 10.0 - 15.0 %    Platelet Count 275 150 - 450 10e3/uL    % Neutrophils 46 %    % Lymphocytes 34 %    % Monocytes 9 %    % Eosinophils 10 %    % Basophils 1 %    % Immature Granulocytes 0 %    Absolute Neutrophils 3.1 1.6 - 8.3 10e3/uL    Absolute Lymphocytes 2.3 0.8 - 5.3 10e3/uL    Absolute Monocytes 0.6 0.0 - 1.3 10e3/uL    Absolute Eosinophils 0.7 0.0 - 0.7 10e3/uL    Absolute Basophils 0.1 0.0 - 0.2 10e3/uL    Absolute Immature Granulocytes 0.0 <=0.4 10e3/uL       If you have any questions or concerns, please call the clinic at the number listed above.       Sincerely,      Mariana Sherman MD / [facility]

## 2022-01-25 ENCOUNTER — TELEPHONE (OUTPATIENT)
Dept: FAMILY MEDICINE | Facility: CLINIC | Age: 27
End: 2022-01-25
Payer: COMMERCIAL

## 2022-01-25 DIAGNOSIS — M1A.00X0 CHRONIC IDIOPATHIC GOUT: Primary | ICD-10-CM

## 2022-01-25 LAB
ALBUMIN SERPL-MCNC: 4.6 G/DL (ref 3.4–5)
ALP SERPL-CCNC: 42 U/L (ref 40–150)
ALT SERPL W P-5'-P-CCNC: 30 U/L (ref 0–70)
ANION GAP SERPL CALCULATED.3IONS-SCNC: 6 MMOL/L (ref 3–14)
AST SERPL W P-5'-P-CCNC: 19 U/L (ref 0–45)
BILIRUB SERPL-MCNC: 0.7 MG/DL (ref 0.2–1.3)
BUN SERPL-MCNC: 15 MG/DL (ref 7–30)
CALCIUM SERPL-MCNC: 9.7 MG/DL (ref 8.5–10.1)
CHLORIDE BLD-SCNC: 107 MMOL/L (ref 94–109)
CHOLEST SERPL-MCNC: 165 MG/DL
CO2 SERPL-SCNC: 27 MMOL/L (ref 20–32)
CREAT SERPL-MCNC: 0.84 MG/DL (ref 0.66–1.25)
CREAT UR-MCNC: 130 MG/DL
FASTING STATUS PATIENT QL REPORTED: NO
GFR SERPL CREATININE-BSD FRML MDRD: >90 ML/MIN/1.73M2
GLUCOSE BLD-MCNC: 94 MG/DL (ref 70–99)
HDLC SERPL-MCNC: 44 MG/DL
LDLC SERPL CALC-MCNC: 84 MG/DL
MICROALBUMIN UR-MCNC: 12 MG/L
MICROALBUMIN/CREAT UR: 9.23 MG/G CR (ref 0–17)
NONHDLC SERPL-MCNC: 121 MG/DL
POTASSIUM BLD-SCNC: 4.3 MMOL/L (ref 3.4–5.3)
PROT SERPL-MCNC: 7.6 G/DL (ref 6.8–8.8)
SODIUM SERPL-SCNC: 140 MMOL/L (ref 133–144)
TRIGL SERPL-MCNC: 184 MG/DL
TSH SERPL DL<=0.005 MIU/L-ACNC: 1.57 MU/L (ref 0.4–4)
URATE SERPL-MCNC: 8.1 MG/DL (ref 3.5–7.2)

## 2022-01-25 NOTE — RESULT ENCOUNTER NOTE
Uric acid level is elevated at 8.1 consistent with history of gout.To prevent gout attacks it is recommended it should be below 6.5.He recently started lifestyle changes like avoiding alcohol red meat shellfish etc. weight loss will also help.Would he like to start a medication called allopurinol to lower uric acid levels ? or recheck after lifestyle in a month when I see him back again to decide on that.Anytime someone starts allopurinol there may be a mild gout flare in the first few days of taking the medication as is mobilizing the uric acid out of the body.  But then he should not any going forward.  Some people have to take this medication lifelong.If he can lose 10% of his total body weight avoid alcohol most of the time and red meat hopefully you could come off the medication in the future.  Let me know what route he would like to take.May be good if he can sign up to my chart so that I can send him messages directly.Thyroid was normal as was kidney function, electrolytes, liver tests    Cholesterol is normal as is LDL and HDL but triglycerides are elevated at 184 likely related to not fasting and also being overweight.  Weight loss will help this to    Other lab results already previously commented on

## 2022-01-25 NOTE — TELEPHONE ENCOUNTER
Left message on patient's voicemail to call back and speak with a triage nurse to go over results from Dr. Sherman below    CHARLIE Arenas RN  [facility]          ----- Message from Mariana Sherman MD sent at 1/25/2022 12:09 PM CST -----  Uric acid level is elevated at 8.1 consistent with history of gout.To prevent gout attacks it is recommended it should be below 6.5.He recently started lifestyle changes like avoiding alcohol red meat shellfish etc. weight loss will also help.Would he like to start a medication called allopurinol to lower uric acid levels ? or recheck after lifestyle in a month when I see him back again to decide on that.Anytime someone starts allopurinol there may be a mild gout flare in the first few days of taking the medication as is mobilizing the uric acid out of the body.  But then he should not any going forward.  Some people have to take this medication lifelong.If he can lose 10% of his total body weight avoid alcohol most of the time and red meat hopefully you could come off the medication in the future.  Let me know what route he would like to take.May be good if he can sign up to my chart so that I can send him messages directly.Thyroid was normal as was kidney function, electrolytes, liver tests    Cholesterol is normal as is LDL and HDL but triglycerides are elevated at 184 likely related to not fasting and also being overweight.  Weight loss will help this to    Other lab results already previously commented on

## 2022-01-28 RX ORDER — ALLOPURINOL 100 MG/1
100 TABLET ORAL DAILY
Qty: 30 TABLET | Refills: 1 | Status: SHIPPED | OUTPATIENT
Start: 2022-01-28 | End: 2022-03-03

## 2022-01-28 NOTE — TELEPHONE ENCOUNTER
Relayed message to patient via [employer] message    BRANDIE ArenasN RN  [facility]

## 2022-01-28 NOTE — TELEPHONE ENCOUNTER
Patient called back.     Went over results with pt.    He is interested in starting allopurinol now. Please send to the pended pharmacy.     BRANDIE ArenasN RN  [facility]

## 2022-01-28 NOTE — TELEPHONE ENCOUNTER
Sent in allopurinol 100 mg daily   recheck uric acid on this med in about a month or when I see him next can do and then can adjust dose up or down if needed

## 2022-02-01 ENCOUNTER — MYC MEDICAL ADVICE (OUTPATIENT)
Dept: FAMILY MEDICINE | Facility: CLINIC | Age: 27
End: 2022-02-01
Payer: COMMERCIAL

## 2022-03-02 PROBLEM — Z82.69 FAMILY HISTORY OF GOUT: Status: ACTIVE | Noted: 2022-03-02

## 2022-03-02 PROBLEM — Z87.39 HISTORY OF GOUT: Status: RESOLVED | Noted: 2022-01-24 | Resolved: 2022-03-02

## 2022-03-02 PROBLEM — M1A.00X0 CHRONIC IDIOPATHIC GOUT: Status: ACTIVE | Noted: 2022-03-02

## 2022-03-03 ENCOUNTER — OFFICE VISIT (OUTPATIENT)
Dept: FAMILY MEDICINE | Facility: CLINIC | Age: 27
End: 2022-03-03
Payer: COMMERCIAL

## 2022-03-03 VITALS
WEIGHT: 268.2 LBS | HEART RATE: 83 BPM | BODY MASS INDEX: 33.35 KG/M2 | OXYGEN SATURATION: 97 % | HEIGHT: 75 IN | RESPIRATION RATE: 18 BRPM | DIASTOLIC BLOOD PRESSURE: 60 MMHG | SYSTOLIC BLOOD PRESSURE: 120 MMHG | TEMPERATURE: 97.9 F

## 2022-03-03 DIAGNOSIS — R06.09 DYSPNEA ON EXERTION: ICD-10-CM

## 2022-03-03 DIAGNOSIS — R03.0 ELEVATED BLOOD PRESSURE READING WITHOUT DIAGNOSIS OF HYPERTENSION: ICD-10-CM

## 2022-03-03 DIAGNOSIS — M1A.00X0 CHRONIC IDIOPATHIC GOUT: ICD-10-CM

## 2022-03-03 DIAGNOSIS — Z82.69 FAMILY HISTORY OF GOUT: ICD-10-CM

## 2022-03-03 DIAGNOSIS — J45.40 MODERATE PERSISTENT ASTHMA WITHOUT COMPLICATION: Primary | ICD-10-CM

## 2022-03-03 DIAGNOSIS — R41.840 DISTURBED CONCENTRATION: ICD-10-CM

## 2022-03-03 DIAGNOSIS — Z23 NEED FOR TDAP VACCINATION: ICD-10-CM

## 2022-03-03 LAB
ANION GAP SERPL CALCULATED.3IONS-SCNC: 8 MMOL/L (ref 3–14)
BUN SERPL-MCNC: 13 MG/DL (ref 7–30)
CALCIUM SERPL-MCNC: 9.4 MG/DL (ref 8.5–10.1)
CHLORIDE BLD-SCNC: 107 MMOL/L (ref 94–109)
CO2 SERPL-SCNC: 24 MMOL/L (ref 20–32)
CREAT SERPL-MCNC: 0.86 MG/DL (ref 0.66–1.25)
GFR SERPL CREATININE-BSD FRML MDRD: >90 ML/MIN/1.73M2
GLUCOSE BLD-MCNC: 101 MG/DL (ref 70–99)
POTASSIUM BLD-SCNC: 4.1 MMOL/L (ref 3.4–5.3)
SODIUM SERPL-SCNC: 139 MMOL/L (ref 133–144)
URATE SERPL-MCNC: 7.6 MG/DL (ref 3.5–7.2)

## 2022-03-03 PROCEDURE — 90471 IMMUNIZATION ADMIN: CPT | Performed by: FAMILY MEDICINE

## 2022-03-03 PROCEDURE — 90715 TDAP VACCINE 7 YRS/> IM: CPT | Performed by: FAMILY MEDICINE

## 2022-03-03 PROCEDURE — 99214 OFFICE O/P EST MOD 30 MIN: CPT | Mod: 25 | Performed by: FAMILY MEDICINE

## 2022-03-03 PROCEDURE — 36415 COLL VENOUS BLD VENIPUNCTURE: CPT | Performed by: FAMILY MEDICINE

## 2022-03-03 PROCEDURE — 80048 BASIC METABOLIC PNL TOTAL CA: CPT | Performed by: FAMILY MEDICINE

## 2022-03-03 PROCEDURE — 84550 ASSAY OF BLOOD/URIC ACID: CPT | Performed by: FAMILY MEDICINE

## 2022-03-03 RX ORDER — ALLOPURINOL 100 MG/1
100 TABLET ORAL DAILY
Qty: 90 TABLET | Refills: 0 | Status: SHIPPED | OUTPATIENT
Start: 2022-03-03 | End: 2022-04-08

## 2022-03-03 RX ORDER — ALBUTEROL SULFATE 90 UG/1
2 AEROSOL, METERED RESPIRATORY (INHALATION) EVERY 6 HOURS PRN
Qty: 18 G | Refills: 0 | Status: SHIPPED | OUTPATIENT
Start: 2022-03-03 | End: 2022-04-11

## 2022-03-03 ASSESSMENT — ASTHMA QUESTIONNAIRES
QUESTION_1 LAST FOUR WEEKS HOW MUCH OF THE TIME DID YOUR ASTHMA KEEP YOU FROM GETTING AS MUCH DONE AT WORK, SCHOOL OR AT HOME: NONE OF THE TIME
QUESTION_3 LAST FOUR WEEKS HOW OFTEN DID YOUR ASTHMA SYMPTOMS (WHEEZING, COUGHING, SHORTNESS OF BREATH, CHEST TIGHTNESS OR PAIN) WAKE YOU UP AT NIGHT OR EARLIER THAN USUAL IN THE MORNING: ONCE A WEEK
ACT_TOTALSCORE: 17
ACT_TOTALSCORE: 17
QUESTION_2 LAST FOUR WEEKS HOW OFTEN HAVE YOU HAD SHORTNESS OF BREATH: THREE TO SIX TIMES A WEEK
QUESTION_5 LAST FOUR WEEKS HOW WOULD YOU RATE YOUR ASTHMA CONTROL: WELL CONTROLLED
QUESTION_4 LAST FOUR WEEKS HOW OFTEN HAVE YOU USED YOUR RESCUE INHALER OR NEBULIZER MEDICATION (SUCH AS ALBUTEROL): ONE OR TWO TIMES PER DAY

## 2022-03-03 NOTE — RESULT ENCOUNTER NOTE
Ruth Mr. Martin,  Some of your results came back and are within acceptable limits. -Kidney function is normal (Cr, GFR), Sodium is normal, Potassium is normal, Calcium is normal, Glucose is normal. . If you have any further concerns please do not hesitate to contact us by message, phone or making an appointment.  Have a good day   Dr Lane MORAN

## 2022-03-03 NOTE — PROGRESS NOTES
Assessment & Plan     Moderate persistent asthma without complication  Asthma better. ACT up from 13 to 17. Only restarted generic Advair 1.5 weeks ago and expected to further improve with time. Continue generic Advair 1 puff twice a day ( turned out with his new insurance was cheaper than wixela previously given). Has been on Advair before with good control in the past. Encouraged not to use albuterol first thing in am but use the Advair instead then see if albuterol needed. Goal is to get to point where albuterol only used as rescue and one inhaler lasts 1 year preferably. May use albuterol prior to exercise. Lung function tests ordered. No need for pulmonary referral yet. Encouraged to avoid vaping/ inhaling cannabis to protect lungs    BP better, continue with low salt, low alcohol diet, currently no indication to check an ultrasound or do labs to search for secondary causes of elevated BP     BMI improved, continue to work on diet, exercise and loosing 10 % of total body weight  Over the year and rechecking TG at next physical.    Gout stable on allopurinol 100 mg, will see uric acid level today and adjust med after that as needed. Continue with hypouricemic diet ( low red meat, low alcohol). Use indomethacin sparingly as can raise BP( not required in a while) No indication currently to see a rheumatologist     Diagnostic eval for decreased concentration scheduled in May outside [place]    Tdap given today     Check in virtually in 4 weeks or so for asthma check in   - General PFT Lab (Please always keep checked); Future  - Pulmonary Function Test; Future  - albuterol (VENTOLIN HFA) 108 (90 Base) MCG/ACT inhaler; Inhale 2 puffs into the lungs every 6 hours as needed for shortness of breath / dyspnea or wheezing    Dyspnea on exertion  Improved now back on a controller inhaler.  - General PFT Lab (Please always keep checked); Future  - Pulmonary Function Test; Future    Elevated blood pressure reading without  diagnosis of hypertension  BP better, continue with low salt, low alcohol diet, currently no indication to check an ultrasound or do labs to search for secondary causes of elevated BP   - Basic metabolic panel  (Ca, Cl, CO2, Creat, Gluc, K, Na, BUN); Future    BMI 34.0-34.9,adult  BMI improved, continue to work on diet, exercise and loosing 10 % of total body weight  Over the year and rechecking TG at next physical.  - Basic metabolic panel  (Ca, Cl, CO2, Creat, Gluc, K, Na, BUN); Future    Chronic idiopathic gout/ Family history of gout  Gout stable on allopurinol 100 mg, will see uric acid level today and adjust med after that as needed. Continue with hypouricemic diet ( low red meat, low alcohol). Use indomethacin sparingly as can raise BP( not required in a while) No indication currently to see a rheumatologist   - Basic metabolic panel  (Ca, Cl, CO2, Creat, Gluc, K, Na, BUN); Future  - Uric acid; Future    Disturbed concentration  Diagnostic eval for decreased concentration scheduled in May outside [place]    Need for Tdap vaccination  - TDAP VACCINE (Adacel, Boostrix)  [[id number]]    Review of the result(s) of each unique test - uri acid , bmp, tg   Ordering of each unique test  Prescription drug management  30 minutes spent on the date of the encounter doing chart review, history and exam, documentation and further activities per the note  MEDICATIONS:  Continue current medications without change  FURTHER TESTING:       - PFTS  FUTURE APPOINTMENTS:       - Follow-up visit in 4 weeks virtually for asthma  Work on weight loss  Regular exercise  See Patient Instructions    Return in about 4 weeks (around 3/31/2022) for Follow up, with me, in person, using a video visit.    Mariana Sherman MD  [facility]    Eric Hernandez is a 26 year old who presents for the following health issues  accompanied by his alone.    HPI     BP Follow-up    Do you check your blood pressure regularly outside  of the clinic? No     Are you following a low salt diet? No    Are your blood pressures ever more than 140 on the top number (systolic) OR more   than 90 on the bottom number (diastolic), for example 140/90? No  Not checking at home  BP today wnl  Lost few pounds, restarted gym    Asthma Follow-Up  Was ACT completed today?    Yes    ACT Total Scores 3/3/2022   ACT TOTAL SCORE (Goal Greater than or Equal to 20) 17   In the past 12 months, how many times did you visit the emergency room for your asthma without being admitted to the hospital? 0   In the past 12 months, how many times were you hospitalized overnight because of your asthma? 0     How many days per week do you miss taking your asthma controller medication?  1    Please describe any recent triggers for your asthma: dust mites, humidity and exercise or sports    Have you had any Emergency Room Visits, Urgent Care Visits, or Hospital Admissions since your last office visit?  No    How many servings of fruits and vegetables do you eat daily?  2-3    On average, how many sweetened beverages do you drink each day (Examples: soda, juice, sweet tea, etc.  Do NOT count diet or artificially sweetened beverages)?   1    How many days per week do you exercise enough to make your heart beat faster? 4    How many minutes a day do you exercise enough to make your heart beat faster? 30 - 60  How many days per week do you miss taking your medication? 1    What makes it hard for you to take your medications?  remembering to take/cost    CURRENTLY   Asthma , picked up generic Advair 1.5 week ago, still using albuterol once a day feels wheezy when gets up in am  Picked Advair as new insurance covering this and wixela actually twice the cost now  Still using cannabis  Dyspnea on exertion improved, not with regular activity, has started working out at the gym     BP improved, not checking at home.   BMI 34 to 33, lost few pounds since started working out at the gym and eating  better    Gout doing fine, allopurinol didnt trigger an attack , no new gout attack since last seen  On allopurinol 100 mg daily past 3 weeks. Will do labs today . Needs refills as will be picking up last one today     Concentration issues:[facility] unable to get him in till  but [facility] get [facility] for eval in may    Unsure if Tdap utd will get today     No fever or chills, no headache or dizziness, no double or blurry vision, no facial pain, earache, sore throat, runny nose, post nasal drip, no trouble hearing, smelling, tasting or swallowing, no cough , no chest pain, trouble breathing or palpitations, No abdominal pain, heart burn, reflux, nausea or vomiting or diarrhea or constipation, no blood in stools or black stools, no weight loss or night sweats. No dysuria, hematuria, frequency, urgency, hesitancy, incontinence, No pelvic complaints. No leg swelling or joint pain. No rash.    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  [place]  negative.    Moved to [place] in , no prior records available, no regular care prior to that, Seen in urgent care 2019 for toe pain due to history of gout treated with prednisone, tested positive for COVID in 2020 and 2021.  Seen once in [place] on 2021 in urgent care for left toe pain and prior history of gout and given prednisone for 5 days.      Seen first time 22 to establish care and for preventive health and additional concerns. Advised  Self  testicular check regularly. Labs done. Health care maintenance reviewed. Discussed working on healthcare directives.  Was given the Flu & pneumonia vaccines. He was to clarify when he last had Tdap & noted Covid vaccine x3 up-to-date.     Asthma was uncontrolled as had run out of his controller meds 6 months prior, noting shortness of breath with activity limiting exercise, resumed generic Wixela  250/50, 1 puff twice a day & if not better can do lung function tests and refer to pulmonary.     Noted Elevated blood pressure could be white coat hypertension & or  due to weight.  Recommended a low-salt diet, limiting alcohol to 1 drink a day no more than 7 total a week, 10% of total body weight loss & increasing aerobic activity, encouraged to katrina a home blood pressure machine and checking it at different times different days and calling us if it was consistently more than 130 x 80.  If persistently elevated despite lifestyle changes then to consider medications. Discussed BMI, diet, exercise weight loss, avoiding marijuana as much as possible as this could increase weight gain over time     Reviewed his history of gout diagnosed clinically based on toe pain in the past and family history of gout in dad.  Recent joint pain had resolved.  Had made some recent lifestyle & dietary changes. Would avoid as needed prednisone as much as possible given long-term side effect (can worsen weight gain increases risk of diabetes, ulcers), may use indomethacin he has at home as needed.  Always try to use the lowest dose for the shortest duration of time as Indocin like other NSAID's increased risk of GI bleeding and heart attack.  Other options could be colchicine prn.  Side effects would be nausea vomiting diarrhea etc.  We can also consider referral to rheumatologist if necessary.     History of trouble focusing now affecting job: referred to a psychologist for diagnostic evaluation of ADHD.  Encouraged to avoid all marijuana  "prior to evaluation so they can get an unbiased evaluation.  Based on results could make recommendations for either skill base treatment and/or referral to a community psychiatrist for medication management.  Some medications like stimulants could make blood pressure worse so this might be tricky if his blood pressure remained elevated. Was to return in 1 month for blood pressure check, asthma follow-up etc.     Lab showed a normal cbc, TSH, HBA1c, microalbumin, lipids other than elevated TG and uric acd was elevated at 8.1 & allopurinol started.     Review of Systems   Constitutional, HEENT, cardiovascular, pulmonary, GI, , musculoskeletal, neuro, skin, endocrine and psych systems are negative, except as otherwise noted.      Objective    /60 (BP Location: Right arm, Patient Position: Sitting, Cuff Size: Adult Large)   Pulse 83   Temp 97.9  F (36.6  C) (Temporal)   Resp 18   Ht 1.895 m (6' 2.61\")   Wt 121.7 kg (268 lb 3.2 oz)   SpO2 97%   BMI 33.88 kg/m    Body mass index is 33.88 kg/m .  Physical Exam   GENERAL: healthy, alert, no distress and obese  EYES: Eyes grossly normal to inspection, PERRL and conjunctivae and sclerae normal  HENT: ear canals and TM's normal, nose and mouth without ulcers or lesions  NECK: no adenopathy, no asymmetry, masses, or scars and thyroid normal to palpation  RESP: lungs clear to auscultation - no rales, rhonchi or wheezes  CV: regular rate and rhythm, normal S1 S2, no S3 or S4, no murmur, click or rub, no peripheral edema and peripheral pulses strong  ABDOMEN: soft, nontender, no hepatosplenomegaly, no masses and bowel sounds normal  MS: no gross musculoskeletal defects noted, no edema  SKIN: no suspicious lesions or rashes  NEURO: Normal strength and tone, mentation intact and speech normal  PSYCH: mentation appears normal, affect normal/bright, rooms smells of THC    No results found for any visits on 03/03/22.          "

## 2022-03-03 NOTE — PATIENT INSTRUCTIONS
Asthma better  Should improve with time  Continue generic advair 1 puff twice a day   Do not use albuterol first thing in am use the advair then see if albuterol needed  Goal is to get to point where albuterol only used as rescue and one inhaler last 1 year preferably  May use albuterol prior to exercise  Lung function tests ordered  No need for pulmonary referral yet.   Avoid vaping/ inhaling cannabis to protect lungs    BP better, continue with low salt, low alcohol diet, currently no indication to check an ultrasound or do labs to search for secondary causes of elevated BP     BMI improved, continue to work on diet, exercise and loosing 10 % of total body weight     Gout stable on allopurinol 100 mg, will see uric acid level today and adjust med after that as needed. continue with hypouricemic diet ( low red meat, low alcohol)   Use indomethacin sparingly as can raise BP. ( not required in a while)  No indication currently to see a rheumatologist     Diagnostic eval for decreased concentration scheduled in may outside [place]    Tdap today     Check in virtually in 4 weeks or so for asthma check in

## 2022-03-03 NOTE — PROGRESS NOTES
Prior to immunization administration, verified patients identity using patient s name and date of birth. Please see Immunization Activity for additional information.     Screening Questionnaire for Adult Immunization    Are you sick today?   No   Do you have allergies to medications, food, a vaccine component or latex?   No   Have you ever had a serious reaction after receiving a vaccination?   No   Do you have a long-term health problem with heart, lung, kidney, or metabolic disease (e.g., diabetes), asthma, a blood disorder, no spleen, complement component deficiency, a cochlear implant, or a spinal fluid leak?  Are you on long-term aspirin therapy?   No   Do you have cancer, leukemia, HIV/AIDS, or any other immune system problem?   No   Do you have a parent, brother, or sister with an immune system problem?   No   In the past 3 months, have you taken medications that affect  your immune system, such as prednisone, other steroids, or anticancer drugs; drugs for the treatment of rheumatoid arthritis, Crohn s disease, or psoriasis; or have you had radiation treatments?   No   Have you had a seizure, or a brain or other nervous system problem?   No   During the past year, have you received a transfusion of blood or blood    products, or been given immune (gamma) globulin or antiviral drug?   No   For women: Are you pregnant or is there a chance you could become       pregnant during the next month?   No   Have you received any vaccinations in the past 4 weeks?   No     Immunization questionnaire answers were all negative.        Per orders of Dr. Sherman, injection of Tdap given by Renetta Canas MA. Patient instructed to remain in clinic for 15 minutes afterwards, and to report any adverse reaction to me immediately.       Screening performed by Renetta Canas LPN on 3/3/2022 at 9:54 AM.    Answers for HPI/ROS submitted by the patient on 3/3/2022  Do you have a cough?: No  Are you experiencing any wheezing in your chest?:  No  Do you have any shortness of breath?: No  Use of rescue inhaler:: daily  Taking Asthma medication as prescribed:: 1  Asthma triggers:: exercise or sports  Have you had any Emergency Room visits, Urgent Care visits, or Hospital Admissions since your last office visit?: No  How many servings of fruits and vegetables do you eat daily?: 2-3  On average, how many sweetened beverages do you drink each day (Examples: soda, juice, sweet tea, etc.  Do NOT count diet or artificially sweetened beverages)?: 1  How many minutes a day do you exercise enough to make your heart beat faster?: 10 to 19  How many days a week do you exercise enough to make your heart beat faster?: 4  How many days per week do you miss taking your medication?: 1

## 2022-03-03 NOTE — RESULT ENCOUNTER NOTE
Hello Mr. Martin,  Your results came back and uric acid is improved from 8.1 to 7.6.  Since only started about 3 weeks ago  & asymptomatic off gout, I think recommend continuing 100 mg allopurinol daily and I sent this in for 90 days.  Would like to see you in for a lab only apt  ( you can schedule this) in 90 days and then based on that next result can decide on further dosing.  If you have any further concerns please do not hesitate to contact us by message, phone or making an appointment.  Have a good day   Dr Lane MORAN

## 2022-04-08 ENCOUNTER — VIRTUAL VISIT (OUTPATIENT)
Dept: FAMILY MEDICINE | Facility: CLINIC | Age: 27
End: 2022-04-08
Payer: COMMERCIAL

## 2022-04-08 DIAGNOSIS — J45.40 MODERATE PERSISTENT ASTHMA WITHOUT COMPLICATION: Primary | ICD-10-CM

## 2022-04-08 DIAGNOSIS — M1A.00X0 CHRONIC IDIOPATHIC GOUT: ICD-10-CM

## 2022-04-08 DIAGNOSIS — R41.840 DISTURBED CONCENTRATION: ICD-10-CM

## 2022-04-08 PROCEDURE — 99214 OFFICE O/P EST MOD 30 MIN: CPT | Mod: 95 | Performed by: FAMILY MEDICINE

## 2022-04-08 RX ORDER — ALLOPURINOL 100 MG/1
100 TABLET ORAL DAILY
Qty: 90 TABLET | Refills: 0 | Status: SHIPPED | OUTPATIENT
Start: 2022-04-08 | End: 2022-08-04

## 2022-04-08 ASSESSMENT — ASTHMA QUESTIONNAIRES: ACT_TOTALSCORE: 22

## 2022-04-08 NOTE — PATIENT INSTRUCTIONS
Brianna asthma is doing better  Continue advair 250 /50 twice a day   Use albuterol as rescue  Arrange lung function tests, can call [phone] if not heard from them in 3 business days  Currently no indication to see a pulmonologist  Gout stable on allopurinol 100 mg daily. Uric acid improved from original. Goal is eventually keep it less than 6.5 to prevent attacks  Continue with a low uric acid level in diet  Keep diagnostic assessment for adhd eval in may as planned   Recheck uric acid level in 2 to 3 months in a lab only apt   See you back in 5 to 6 months for asthma  Contact us sooner for any concerns

## 2022-04-08 NOTE — PROGRESS NOTES
David is a 26 year old who is being evaluated via a billable video visit.      How would you like to obtain your AVS? MyChart  If the video visit is dropped, the invitation should be resent by:  Will anyone else be joining your video visit? No  [email or web address]  Video Start Time: 1511    Assessment & Plan     Moderate persistent asthma without complication  Asthma is doing better  Continue Advair discus he is taking 250 /50 twice a day. Refill sent in 1 yr  Use albuterol as rescue  No one contacted him about PFT scheduling yet. Referral for this placed again. To arrange lung function tests, can call [phone] if not heard from them in 3 business days  Currently no indication to see a pulmonologist  Gout stable on allopurinol 100 mg daily. Uric acid improved from original. Goal is eventually keep it less than 6.5 to prevent attacks  Continue with a low uric acid level in diet ( diet / lifestyle measures)   Keep diagnostic assessment for ADHD eval in may as planned   Recheck uric acid level in 2 to 3 months in a lab only apt to adjust meds further as needed  See back in 5 to 6 months for asthma  Contact us sooner for any concerns  - General PFT Lab (Please always keep checked); Future  - Pulmonary Function Test; Future  - fluticasone-salmeterol (ADVAIR) 250-50 MCG/DOSE inhaler; Inhale 1 puff into the lungs every 12 hours    Chronic idiopathic gout  Gout stable on allopurinol 100 mg daily. Uric acid improved from original. Goal is eventually keep it less than 6.5 to prevent attacks  Continue with a low uric acid level in diet ( diet / lifestyle measures)   Keep diagnostic assessment for ADHD eval in may as planned   Recheck uric acid level in 2 to 3 months in a lab only apt to adjust meds further as needed  - **Uric acid FUTURE 2mo; Future  - allopurinol (ZYLOPRIM) 100 MG tablet; Take 1 tablet (100 mg) by mouth daily    Disturbed concentration  Keep diagnostic assessment for ADHD eval in may as planned     Review of the  result(s) of each unique test - uric acid  Ordering of each unique test  Prescription drug management  39 minutes spent on the date of the encounter doing chart review, history and exam, documentation and further activities per the note     Work on weight loss  Regular exercise  See Patient Instructions    Return in about 6 months (around 10/8/2022) for Follow up, with me, in person.    Mariana Sherman MD  [facility]    Eric Hernandez is a 26 year old who presents for the following health issues  accompanied by his alone.    HPI   Asthma Follow-Up  Was ACT completed today?    Yes    ACT Total Scores 4/8/2022   ACT TOTAL SCORE (Goal Greater than or Equal to 20) 22   In the past 12 months, how many times did you visit the emergency room for your asthma without being admitted to the hospital? 0   In the past 12 months, how many times were you hospitalized overnight because of your asthma? 0   How many days per week do you miss taking your asthma controller medication?  0    Please describe any recent triggers for your asthma: None    Have you had any Emergency Room Visits, Urgent Care Visits, or Hospital Admissions since your last office visit?  No      How many servings of fruits and vegetables do you eat daily?  2-3    On average, how many sweetened beverages do you drink each day (Examples: soda, juice, sweet tea, etc.  Do NOT count diet or artificially sweetened beverages)?   0    How many days per week do you exercise enough to make your heart beat faster? 4    How many minutes a day do you exercise enough to make your heart beat faster? 30 - 60    How many days per week do you miss taking your medication? 0    CURRENTLY   Feeling better back on Advair discus 250 1puff bid, not on wixela, Advair now covered again  Albuterol use much less 1 to 2 a week, no longer using first thing in am and noticing Advair is helping for that too    Gout doing well on allopurinol and dietary / lifestyle  changes. 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  [place]  negative.  Moved to [place] in , no prior records available, no regular care prior to that, Seen in urgent care 2019 for toe pain due to history of gout treated with prednisone, tested positive for COVID in 2020 and 2021.  Seen once in [place] on 2021 in urgent care for left toe pain and prior history of gout and given prednisone for 5 days.    Seen first time 22 to establish care and for preventive health and additional concerns. Advised  Self testicular check regularly. Labs done. Health care maintenance reviewed. Discussed working on healthcare directives.  Was given the Flu & pneumonia vaccines. He was to clarify when he last had Tdap & noted Covid vaccine x3 up-to-date.  Asthma was uncontrolled as had run out of his controller meds 6 months prior, noting shortness of breath with activity limiting exercise, resumed generic Wixela  250/50, 1 puff twice a day & if not better can do lung function tests and refer to pulmonary.   Noted Elevated blood pressure could be white coat hypertension & or  due to weight.  Recommended a low-salt diet, limiting alcohol to 1 drink a day no more than 7 total a week, 10% of total body weight loss & increasing aerobic activity, encouraged to katrina a home blood pressure machine and checking it at different  times different days and calling us if it was consistently more than 130 x 80.  If persistently elevated despite lifestyle changes then to consider medications. Discussed BMI, diet, exercise weight loss, avoiding marijuana as much as possible as this could increase weight gain over time  Reviewed his history of gout diagnosed clinically based on toe pain in the past and family history of gout in dad.  Recent joint pain had resolved.  Had made some recent lifestyle & dietary changes. Would avoid as needed prednisone as much as possible given long-term side effect (can worsen weight gain increases risk of diabetes, ulcers), may use indomethacin he has at home as needed.  Always try to use the lowest dose for the shortest duration of time as Indocin like other NSAID's increased risk of GI bleeding and heart attack.  Other options could be colchicine prn.  Side effects would be nausea vomiting diarrhea etc.  We can also consider referral to rheumatologist if necessary.  History of trouble focusing now affecting job: referred to a psychologist for diagnostic evaluation of ADHD.  Encouraged to avoid all marijuana prior to evaluation so they can get an unbiased evaluation.  Based on results could make recommendations for either skill base treatment and/or referral to a community psychiatrist for medication management.  Some medications like stimulants could make blood pressure worse so this might be tricky if his blood pressure remained elevated. Was to return in 1 month for blood pressure check, asthma follow-up etc.   Lab showed a normal cbc, TSH, HBA1c, Microalbumin, lipids other than elevated TG and uric acd was elevated at 8.1 & allopurinol started.  Seen  3/3/2022  & noted Asthma was better. ACT up from 13 to 17. Had only restarted generic Advair 1.5 weeks prior and expected to further improve with time. Continued on generic Advair 1 puff twice a day ( turned out with his new insurance was cheaper than wixela  previously given). Had been on Advair before with good control in the past. Encouraged not to use albuterol first thing in am but use the Advair instead then see if albuterol needed. Goal was to get to point where albuterol only used as rescue and one inhaler last 1 year preferably.  Encouraged to use albuterol prior to exercise if indicated. Lung function tests ordered. Felt no need for pulmonary referral yet. Encouraged to avoid vaping/ inhaling cannabis to protect lungs  BP was better, & advised to continue with low salt, low alcohol diet, there was no indication to check an ultrasound or do labs to search for secondary causes of elevated BP at the time. BMI had improved, was to continue to work on diet, exercise and loosing 10 % of total body weight over the year and rechecking TG at next physical.  Gout noted stable on allopurinol 100 mg, uric acid level checked to see if dose needed adjustment, was to continue with a hypouricemic diet ( low red meat, low alcohol). Use indomethacin sparingly as could raise BP( not required in a while) & felt no indication currently to see a rheumatologist   Noted his diagnostic eval for decreased concentration was scheduled in May outside [place]. Tdap was  given & advised to check in virtually in 4 weeks or so for asthma check in.  BMP came back normal and uric acid was down from 8.1 to 7.6 and continued on allopurinol 100 mg daily.    Review of Systems   Constitutional, HEENT, cardiovascular, pulmonary, GI, , musculoskeletal, neuro, skin, endocrine and psych systems are negative, except as otherwise noted.      Objective           Vitals:  No vitals were obtained today due to virtual visit.    Physical Exam   GENERAL: Healthy, alert and no distress  EYES: Eyes grossly normal to inspection.  No discharge or erythema, or obvious scleral/conjunctival abnormalities.  RESP: No audible wheeze, cough, or visible cyanosis.  No visible retractions or increased work of breathing.     SKIN: Visible skin clear. No significant rash, abnormal pigmentation or lesions.  NEURO: Cranial nerves grossly intact.  Mentation and speech appropriate for age.  PSYCH: Mentation appears normal, affect normal/bright, judgement and insight intact, normal speech and appearance well-groomed.    No results found for any visits on 04/08/22.          Video-Visit Details    Type of service:  Video Visit    Video End Time:1534    Originating Location (pt. Location): Home    Distant Location (provider location):  [facility]     Platform used for Video Visit: [employer]

## 2022-04-10 DIAGNOSIS — J45.40 MODERATE PERSISTENT ASTHMA WITHOUT COMPLICATION: ICD-10-CM

## 2022-04-11 RX ORDER — ALBUTEROL SULFATE 90 UG/1
AEROSOL, METERED RESPIRATORY (INHALATION)
Qty: 18 G | Refills: 0 | Status: SHIPPED | OUTPATIENT
Start: 2022-04-11 | End: 2022-05-27

## 2022-04-11 NOTE — TELEPHONE ENCOUNTER
Prescription approved per [facility] Refill Protocol.  BRANDIE WiseN, RN  [facility]

## 2022-06-28 DIAGNOSIS — J45.40 MODERATE PERSISTENT ASTHMA WITHOUT COMPLICATION: ICD-10-CM

## 2022-06-29 DIAGNOSIS — J45.40 MODERATE PERSISTENT ASTHMA WITHOUT COMPLICATION: ICD-10-CM

## 2022-06-29 RX ORDER — ALBUTEROL SULFATE 90 UG/1
AEROSOL, METERED RESPIRATORY (INHALATION)
Qty: 6.7 G | Refills: 0 | Status: SHIPPED | OUTPATIENT
Start: 2022-06-29 | End: 2022-08-11

## 2022-06-29 NOTE — TELEPHONE ENCOUNTER
Prescription approved per [facility] Refill Protocol.  GLORY Vaughn RN  [facility]

## 2022-06-30 RX ORDER — ALBUTEROL SULFATE 90 UG/1
AEROSOL, METERED RESPIRATORY (INHALATION)
Qty: 20.1 G | OUTPATIENT
Start: 2022-06-30

## 2022-08-01 DIAGNOSIS — M1A.00X0 CHRONIC IDIOPATHIC GOUT: ICD-10-CM

## 2022-08-04 RX ORDER — ALLOPURINOL 100 MG/1
TABLET ORAL
Qty: 90 TABLET | Refills: 1 | Status: SHIPPED | OUTPATIENT
Start: 2022-08-04 | End: 2022-10-17

## 2022-08-04 NOTE — TELEPHONE ENCOUNTER
Routing refill request to provider for review/approval because:  --Labs out of range:  Uric acid.      --Last Written Prescription Date:     Disp Refills Start End RICHY   allopurinol (ZYLOPRIM) 100 MG tablet 90 tablet 0 4/8/2022  No   Sig - Route: Take 1 tablet (100 mg) by mouth daily          --Last visit:  4/8/2022 virtual with Lane for asthma +2 - RTC 6 months.    --Future Visit: none.    Uric Acid   Date Value Ref Range Status   03/03/2022 7.6 (H) 3.5 - 7.2 mg/dL Final

## 2022-08-11 DIAGNOSIS — J45.40 MODERATE PERSISTENT ASTHMA WITHOUT COMPLICATION: ICD-10-CM

## 2022-08-12 RX ORDER — ALBUTEROL SULFATE 90 UG/1
AEROSOL, METERED RESPIRATORY (INHALATION)
Qty: 20.1 G | OUTPATIENT
Start: 2022-08-12

## 2022-08-12 NOTE — CONFIDENTIAL NOTE
REfused; filled 8/11/22  GLORY Vaughn RN  [facility] [place] [facility]

## 2022-09-06 DIAGNOSIS — J45.40 MODERATE PERSISTENT ASTHMA WITHOUT COMPLICATION: ICD-10-CM

## 2022-09-07 RX ORDER — ALBUTEROL SULFATE 90 UG/1
AEROSOL, METERED RESPIRATORY (INHALATION)
Qty: 6.7 G | Refills: 0 | Status: SHIPPED | OUTPATIENT
Start: 2022-09-07 | End: 2022-10-27

## 2022-09-07 NOTE — TELEPHONE ENCOUNTER
Left message to call back and ask to speak with an available triage nurse.    BRANDIE WiseN, RN  [facility]

## 2022-09-07 NOTE — TELEPHONE ENCOUNTER
Dr. Sherman- Please advise:  1. Writer reviewed medication history for this patient and it has been refilled monthly since January 2022, but ACT > 20  2. Do you recommend asthma follow up visit given frequent albuterol inhaler refill requests?    ACT Total Scores 1/24/2022 3/3/2022 4/8/2022   ACT TOTAL SCORE (Goal Greater than or Equal to 20) 13 17 22   In the past 12 months, how many times did you visit the emergency room for your asthma without being admitted to the hospital? 0 0 0   In the past 12 months, how many times were you hospitalized overnight because of your asthma? 0 0 0         Thank you!  BRANDIE WiseN, RN  [facility]

## 2022-09-07 NOTE — TELEPHONE ENCOUNTER
Yes needs an apt   Albuterol is not meant to be used daily ( is it him or his pharmacy requesting refills)   And usually 1 should be enough for the year  Fact that is requesting monthly refills suggests poor asthma control and need to adjust controller med  Is he taking  His controller med ?  We can also refer him to the pulmonologist to optimize care  He was supposed to do pfts not sure if ever got done , if not done can you given him the number for him to schedule. This would help us figure out things more on how to help him better.

## 2022-09-28 ENCOUNTER — VIRTUAL VISIT (OUTPATIENT)
Dept: FAMILY MEDICINE | Facility: CLINIC | Age: 27
End: 2022-09-28
Payer: COMMERCIAL

## 2022-09-28 DIAGNOSIS — U07.1 SARS-COV-2 POSITIVE: Primary | ICD-10-CM

## 2022-09-28 PROCEDURE — 99213 OFFICE O/P EST LOW 20 MIN: CPT | Mod: CS | Performed by: FAMILY MEDICINE

## 2022-09-28 ASSESSMENT — ENCOUNTER SYMPTOMS
PSYCHIATRIC NEGATIVE: 1
ENDOCRINE NEGATIVE: 1
MUSCULOSKELETAL NEGATIVE: 1
COUGH: 1
ALLERGIC/IMMUNOLOGIC NEGATIVE: 1
ACTIVITY CHANGE: 1
GASTROINTESTINAL NEGATIVE: 1
SINUS PRESSURE: 1
HEMATOLOGIC/LYMPHATIC NEGATIVE: 1
NEUROLOGICAL NEGATIVE: 1
CARDIOVASCULAR NEGATIVE: 1

## 2022-09-28 NOTE — PROGRESS NOTES
David is a 27 year old who is being evaluated via a billable video visit.      How would you like to obtain your AVS? MyChart  If the video visit is dropped, the invitation should be resent by: Text to cell phone: [phone]  Will anyone else be joining your video visit? No      Assessment & Plan     SARS-CoV-2 positive  Tested for positive for COVID today. Has mild symptoms. Wants the antivirals. Reviewed drug -drug interaction with patient. He has no history of CKD that he is aware of .   - nirmatrelvir and ritonavir (PAXLOVID) therapy pack; Take 3 tablets by mouth 2 times daily for 5 days        FUTURE APPOINTMENTS:       - Follow-up visit in one week or sooner as needed    Return in about 1 week (around 10/5/2022) for Follow up.    Brandie Hayes MD  [facility]    Subjective   David is a 27 year old, presenting for the following health issues:  Covid Concern (Tested positive this morning; symptoms started yesterday )      HPI       COVID-19 Symptom Review  How many days ago did these symptoms start? Symptoms started yesterday, tested positive this morning     Are any of the following symptoms significant for you?    New or worsening difficulty breathing? No    Worsening cough? No    Fever or chills? Yes, I felt feverish or had chills.  Feeling chills, but temp is 98    Headache: YES    Sore throat: YES    Chest pain: No    Diarrhea: YES    Body aches? YES    What treatments has patient tried? Tylenol, Nyquil/Dayquil    Does patient live in a nursing home, group home, or shelter? No  Does patient have a way to get food/medications during quarantined? Yes, I have a friend or family member who can help me.              Review of Systems   Constitutional: Positive for activity change.   HENT: Positive for congestion and sinus pressure.    Respiratory: Positive for cough.    Cardiovascular: Negative.    Gastrointestinal: Negative.    Endocrine: Negative.    Genitourinary: Negative.     Musculoskeletal: Negative.    Skin: Negative.    Allergic/Immunologic: Negative.    Neurological: Negative.    Hematological: Negative.    Psychiatric/Behavioral: Negative.             Objective           Vitals:  No vitals were obtained today due to virtual visit.    Physical Exam   GENERAL: Healthy, alert and no distress  EYES: Eyes grossly normal to inspection.  No discharge or erythema, or obvious scleral/conjunctival abnormalities.  RESP: No audible wheeze, cough, or visible cyanosis.  No visible retractions or increased work of breathing.    SKIN: Visible skin clear. No significant rash, abnormal pigmentation or lesions.  NEURO: Cranial nerves grossly intact.  Mentation and speech appropriate for age.  PSYCH: Mentation appears normal, affect normal/bright, judgement and insight intact, normal speech and appearance well-groomed.            Video-Visit Details    Video Start Time: 10:45 AM    Type of service:  Video Visit    Video End Time:10:50 AM    Originating Location (pt. Location): Home    Distant Location (provider location):  [facility]     Platform used for Video Visit: [employer]

## 2022-10-01 ENCOUNTER — HEALTH MAINTENANCE LETTER (OUTPATIENT)
Age: 27
End: 2022-10-01

## 2022-10-14 DIAGNOSIS — M1A.00X0 CHRONIC IDIOPATHIC GOUT: ICD-10-CM

## 2022-10-17 RX ORDER — ALLOPURINOL 100 MG/1
TABLET ORAL
Qty: 90 TABLET | Refills: 0 | Status: SHIPPED | OUTPATIENT
Start: 2022-10-17 | End: 2023-01-30

## 2022-10-17 NOTE — TELEPHONE ENCOUNTER
Routing refill request to provider for review/approval because:  Has Uric Acid on file in past 12 months and value is less than 6      Recent Labs   Lab Test 03/03/22  0952   URIC 7.6*     Last Written Prescription Date:  8/4/22  Last Fill Quantity: 90,  # refills: 1   Last office visit: 4/8/22 Virtual with prescribing provider:  Lane Alexander Office Visit:          BRANDIE ArenasN RN  [facility]

## 2022-10-26 DIAGNOSIS — J45.40 MODERATE PERSISTENT ASTHMA WITHOUT COMPLICATION: ICD-10-CM

## 2022-10-27 RX ORDER — ALBUTEROL SULFATE 90 UG/1
AEROSOL, METERED RESPIRATORY (INHALATION)
Qty: 6.7 G | Refills: 0 | Status: SHIPPED | OUTPATIENT
Start: 2022-10-27 | End: 2023-01-19

## 2022-10-27 NOTE — TELEPHONE ENCOUNTER
Routing refill request to provider for review/approval because:  ACT due - last score was 22 on 4/8/22  Patient needs to be seen because:  overdue for follow up  Bailey refill given x 1    Last Written Prescription Date: 9/7/22  Last Fill Quantity: 6.7g,  # refills: 0   Last office visit: 4/8/22 petra Sherman   Future Office Visit:      Scheduling: please reach out for visit; could also schedule annual for late Jan 2023    GLORY Vaughn RN  [facility]

## 2022-11-25 DIAGNOSIS — J45.40 MODERATE PERSISTENT ASTHMA WITHOUT COMPLICATION: ICD-10-CM

## 2022-11-28 RX ORDER — ALBUTEROL SULFATE 90 UG/1
AEROSOL, METERED RESPIRATORY (INHALATION)
Qty: 20.1 G | OUTPATIENT
Start: 2022-11-28

## 2022-11-28 NOTE — TELEPHONE ENCOUNTER
Patient was to be seen in Sept or Oct.  Has not yet scheduled.  Rafaela Chirinos RN  [facility]

## 2023-01-16 ENCOUNTER — MYC MEDICAL ADVICE (OUTPATIENT)
Dept: FAMILY MEDICINE | Facility: CLINIC | Age: 28
End: 2023-01-16
Payer: COMMERCIAL

## 2023-01-16 DIAGNOSIS — J45.40 MODERATE PERSISTENT ASTHMA WITHOUT COMPLICATION: ICD-10-CM

## 2023-01-19 RX ORDER — ALBUTEROL SULFATE 90 UG/1
2 AEROSOL, METERED RESPIRATORY (INHALATION) EVERY 6 HOURS PRN
Qty: 6.7 G | Refills: 0 | Status: SHIPPED | OUTPATIENT
Start: 2023-01-19 | End: 2023-02-24

## 2023-01-19 NOTE — TELEPHONE ENCOUNTER
Prescription approved per [facility] Refill Protocol.    Writer responded via [employer].    BRANDIE WiseN, RN-[facility]

## 2023-01-23 ASSESSMENT — ENCOUNTER SYMPTOMS
SORE THROAT: 0
DIARRHEA: 0
SHORTNESS OF BREATH: 0
WEAKNESS: 0
HEMATOCHEZIA: 0
PARESTHESIAS: 0
NAUSEA: 0
CHILLS: 0
DIZZINESS: 0
FEVER: 0
HEMATURIA: 0
MYALGIAS: 0
HEARTBURN: 0
PALPITATIONS: 0
COUGH: 0
DYSURIA: 0
JOINT SWELLING: 0
CONSTIPATION: 0
NERVOUS/ANXIOUS: 0
ABDOMINAL PAIN: 0
EYE PAIN: 0
FREQUENCY: 0
ARTHRALGIAS: 0
HEADACHES: 0

## 2023-01-23 ASSESSMENT — ASTHMA QUESTIONNAIRES
QUESTION_3 LAST FOUR WEEKS HOW OFTEN DID YOUR ASTHMA SYMPTOMS (WHEEZING, COUGHING, SHORTNESS OF BREATH, CHEST TIGHTNESS OR PAIN) WAKE YOU UP AT NIGHT OR EARLIER THAN USUAL IN THE MORNING: ONCE OR TWICE
ACT_TOTALSCORE: 19
ACT_TOTALSCORE: 19
QUESTION_5 LAST FOUR WEEKS HOW WOULD YOU RATE YOUR ASTHMA CONTROL: WELL CONTROLLED
QUESTION_2 LAST FOUR WEEKS HOW OFTEN HAVE YOU HAD SHORTNESS OF BREATH: THREE TO SIX TIMES A WEEK
QUESTION_4 LAST FOUR WEEKS HOW OFTEN HAVE YOU USED YOUR RESCUE INHALER OR NEBULIZER MEDICATION (SUCH AS ALBUTEROL): TWO OR THREE TIMES PER WEEK
QUESTION_1 LAST FOUR WEEKS HOW MUCH OF THE TIME DID YOUR ASTHMA KEEP YOU FROM GETTING AS MUCH DONE AT WORK, SCHOOL OR AT HOME: NONE OF THE TIME

## 2023-01-23 NOTE — TELEPHONE ENCOUNTER
Writer responded via [employer].    BRANDIE WiseN, RN-BC  [facility]

## 2023-01-30 ENCOUNTER — OFFICE VISIT (OUTPATIENT)
Dept: FAMILY MEDICINE | Facility: CLINIC | Age: 28
End: 2023-01-30
Payer: COMMERCIAL

## 2023-01-30 VITALS
BODY MASS INDEX: 35.81 KG/M2 | HEART RATE: 77 BPM | SYSTOLIC BLOOD PRESSURE: 140 MMHG | HEIGHT: 74 IN | WEIGHT: 279 LBS | TEMPERATURE: 97.2 F | RESPIRATION RATE: 18 BRPM | DIASTOLIC BLOOD PRESSURE: 80 MMHG | OXYGEN SATURATION: 98 %

## 2023-01-30 DIAGNOSIS — Z00.00 HEALTH CARE MAINTENANCE: ICD-10-CM

## 2023-01-30 DIAGNOSIS — Z82.69 FAMILY HISTORY OF GOUT: ICD-10-CM

## 2023-01-30 DIAGNOSIS — Z23 NEED FOR PNEUMOCOCCAL VACCINATION: ICD-10-CM

## 2023-01-30 DIAGNOSIS — M1A.00X0 CHRONIC IDIOPATHIC GOUT: ICD-10-CM

## 2023-01-30 DIAGNOSIS — R41.840 DISTURBED CONCENTRATION: ICD-10-CM

## 2023-01-30 DIAGNOSIS — Z00.00 ROUTINE HISTORY AND PHYSICAL EXAMINATION OF ADULT: Primary | ICD-10-CM

## 2023-01-30 DIAGNOSIS — Z80.8 FAMILY HISTORY OF SKIN CANCER: ICD-10-CM

## 2023-01-30 DIAGNOSIS — Z86.16 HISTORY OF 2019 NOVEL CORONAVIRUS DISEASE (COVID-19): ICD-10-CM

## 2023-01-30 DIAGNOSIS — I10 HYPERTENSION, UNSPECIFIED TYPE: ICD-10-CM

## 2023-01-30 DIAGNOSIS — J45.40 MODERATE PERSISTENT ASTHMA WITHOUT COMPLICATION: ICD-10-CM

## 2023-01-30 DIAGNOSIS — Z71.89 ADVANCED DIRECTIVES, COUNSELING/DISCUSSION: ICD-10-CM

## 2023-01-30 DIAGNOSIS — D22.9 MULTIPLE PIGMENTED NEVI: ICD-10-CM

## 2023-01-30 DIAGNOSIS — Z23 NEED FOR HEPATITIS B VACCINATION: ICD-10-CM

## 2023-01-30 LAB
ALBUMIN SERPL BCG-MCNC: 4.7 G/DL (ref 3.5–5.2)
ALP SERPL-CCNC: 42 U/L (ref 40–129)
ALT SERPL W P-5'-P-CCNC: 27 U/L (ref 10–50)
ANION GAP SERPL CALCULATED.3IONS-SCNC: 13 MMOL/L (ref 7–15)
AST SERPL W P-5'-P-CCNC: 27 U/L (ref 10–50)
BASOPHILS # BLD AUTO: 0.1 10E3/UL (ref 0–0.2)
BASOPHILS NFR BLD AUTO: 1 %
BILIRUB SERPL-MCNC: 0.4 MG/DL
BUN SERPL-MCNC: 13.6 MG/DL (ref 6–20)
CALCIUM SERPL-MCNC: 10 MG/DL (ref 8.6–10)
CHLORIDE SERPL-SCNC: 105 MMOL/L (ref 98–107)
CHOLEST SERPL-MCNC: 183 MG/DL
CREAT SERPL-MCNC: 0.86 MG/DL (ref 0.67–1.17)
CREAT UR-MCNC: 159 MG/DL
DEPRECATED HCO3 PLAS-SCNC: 24 MMOL/L (ref 22–29)
EOSINOPHIL # BLD AUTO: 0.7 10E3/UL (ref 0–0.7)
EOSINOPHIL NFR BLD AUTO: 10 %
ERYTHROCYTE [DISTWIDTH] IN BLOOD BY AUTOMATED COUNT: 12.8 % (ref 10–15)
GFR SERPL CREATININE-BSD FRML MDRD: >90 ML/MIN/1.73M2
GLUCOSE SERPL-MCNC: 97 MG/DL (ref 70–99)
HBA1C MFR BLD: 5.3 % (ref 0–5.6)
HCT VFR BLD AUTO: 40.8 % (ref 40–53)
HDLC SERPL-MCNC: 39 MG/DL
HGB BLD-MCNC: 13.6 G/DL (ref 13.3–17.7)
IMM GRANULOCYTES # BLD: 0 10E3/UL
IMM GRANULOCYTES NFR BLD: 0 %
LDLC SERPL CALC-MCNC: 87 MG/DL
LYMPHOCYTES # BLD AUTO: 1.9 10E3/UL (ref 0.8–5.3)
LYMPHOCYTES NFR BLD AUTO: 29 %
MCH RBC QN AUTO: 28.8 PG (ref 26.5–33)
MCHC RBC AUTO-ENTMCNC: 33.3 G/DL (ref 31.5–36.5)
MCV RBC AUTO: 86 FL (ref 78–100)
MICROALBUMIN UR-MCNC: <12 MG/L
MICROALBUMIN/CREAT UR: NORMAL MG/G{CREAT}
MONOCYTES # BLD AUTO: 0.5 10E3/UL (ref 0–1.3)
MONOCYTES NFR BLD AUTO: 8 %
NEUTROPHILS # BLD AUTO: 3.6 10E3/UL (ref 1.6–8.3)
NEUTROPHILS NFR BLD AUTO: 53 %
NONHDLC SERPL-MCNC: 144 MG/DL
PLATELET # BLD AUTO: 280 10E3/UL (ref 150–450)
POTASSIUM SERPL-SCNC: 4.5 MMOL/L (ref 3.4–5.3)
PROT SERPL-MCNC: 7 G/DL (ref 6.4–8.3)
RBC # BLD AUTO: 4.72 10E6/UL (ref 4.4–5.9)
SODIUM SERPL-SCNC: 142 MMOL/L (ref 136–145)
TRIGL SERPL-MCNC: 285 MG/DL
TSH SERPL DL<=0.005 MIU/L-ACNC: 1.95 UIU/ML (ref 0.3–4.2)
URATE SERPL-MCNC: 7.8 MG/DL (ref 3.4–7)
WBC # BLD AUTO: 6.8 10E3/UL (ref 4–11)

## 2023-01-30 PROCEDURE — 90677 PCV20 VACCINE IM: CPT | Performed by: FAMILY MEDICINE

## 2023-01-30 PROCEDURE — 90471 IMMUNIZATION ADMIN: CPT | Performed by: FAMILY MEDICINE

## 2023-01-30 PROCEDURE — 99395 PREV VISIT EST AGE 18-39: CPT | Mod: 25 | Performed by: FAMILY MEDICINE

## 2023-01-30 PROCEDURE — 83036 HEMOGLOBIN GLYCOSYLATED A1C: CPT | Performed by: FAMILY MEDICINE

## 2023-01-30 PROCEDURE — 82043 UR ALBUMIN QUANTITATIVE: CPT | Performed by: FAMILY MEDICINE

## 2023-01-30 PROCEDURE — 99214 OFFICE O/P EST MOD 30 MIN: CPT | Mod: 25 | Performed by: FAMILY MEDICINE

## 2023-01-30 PROCEDURE — 84550 ASSAY OF BLOOD/URIC ACID: CPT | Performed by: FAMILY MEDICINE

## 2023-01-30 PROCEDURE — 80050 GENERAL HEALTH PANEL: CPT | Performed by: FAMILY MEDICINE

## 2023-01-30 PROCEDURE — 80061 LIPID PANEL: CPT | Performed by: FAMILY MEDICINE

## 2023-01-30 PROCEDURE — 93000 ELECTROCARDIOGRAM COMPLETE: CPT | Performed by: FAMILY MEDICINE

## 2023-01-30 PROCEDURE — 36415 COLL VENOUS BLD VENIPUNCTURE: CPT | Performed by: FAMILY MEDICINE

## 2023-01-30 PROCEDURE — 82570 ASSAY OF URINE CREATININE: CPT | Performed by: FAMILY MEDICINE

## 2023-01-30 RX ORDER — ALLOPURINOL 100 MG/1
100 TABLET ORAL DAILY
Qty: 90 TABLET | Refills: 1 | Status: SHIPPED | OUTPATIENT
Start: 2023-01-30 | End: 2023-11-20

## 2023-01-30 ASSESSMENT — ENCOUNTER SYMPTOMS
NERVOUS/ANXIOUS: 0
DIARRHEA: 0
SORE THROAT: 0
FEVER: 0
WEAKNESS: 0
DIZZINESS: 0
CONSTIPATION: 0
COUGH: 0
HEMATURIA: 0
HEADACHES: 0
CHILLS: 0
ARTHRALGIAS: 0
PALPITATIONS: 0
PARESTHESIAS: 0
EYE PAIN: 0
SHORTNESS OF BREATH: 0
NAUSEA: 0
HEMATOCHEZIA: 0
HEARTBURN: 0
DYSURIA: 0
MYALGIAS: 0
JOINT SWELLING: 0
FREQUENCY: 0
ABDOMINAL PAIN: 0

## 2023-01-30 NOTE — PATIENT INSTRUCTIONS
Seen for preventive health and additional concerns today   Self testicular check regularly   Labs today and will make further recommendations once reviewed  Healthcare maintenance reviewed.  Does not have healthcare directives and honoring choices given to review.  Flu shot up-to-date.  COVID-vaccine primary and booster series up-to-date.  Recommend pneumonia vaccine Prevnar 20 and given today.  Recommend hepatitis B vaccination feels may have had it when younger will check records with mom from [place] where grew up and if unable to find can do immune testing in the future and go from there.    Moderate persistent asthma currently improved from a year ago but still not at goal.  Continue Advair 250/50 1 puff twice daily rinse mouth well after using inhaler.  Limit use of albuterol as rescue only has been filled 8 times in the last year, initially due to not being on Advair then due to losing inhalers 3 different times.  This is still though a high amount of rescue inhaler use.  Recheck asthma in 3 months.  Schedule lung function tests  In the meantime avoid vaping and work on weight loss which may help.  If asthma scores continue to be less than 20 consider changing Advair to Breo seeing a pulmonologist to further evaluate and treat.  Asthma action plan verbally in place.  Prevnar 20 given.  Noted has 3 refills on Advair currently and butyryl recently refilled so we will contact us when these are needed.    Chronic idiopathic gout and family history of gout currently gout asymptomatic without any flares on allopurinol 100 mg daily.  Has not required indomethacin use in a long time.  Continue to stay well-hydrated, drink alcohol in moderation, decrease red meat consumption and continue current dosing of allopurinol and will check uric acid levels and make further recommendations.  Refill sent in.     Blood pressure elevated last year and again this year.  Recheck better but still borderline.  Could be due to  whitecoat hypertension/anxiety.  Does have family history of hypertension and genetic predisposition for this.  New diagnosis of hypertension made.  Opted to hold off on medication for now.  EKG does not show any effect of blood pressure on the heart.  We will check lab work to make sure there is no endorgan damage from blood pressure.  Recommend a low-salt diet, low alcohol intake, low caffeine intake, increase physical activity, at least 5 to 10% of total body weight loss to make a difference in blood pressure, avoid anti-inflammatories as much as possible, given blood pressure would be hesitant to be prescribing you stimulants but can discuss that more with a psychiatrist when you see them.  Prescription given for home blood pressure machine.  Check blood pressure at home if consistently over 130 x 80 may benefit from medication.  Return in 3 months for blood pressure check and if still elevated consider medication at that time.    BMI more than 35, reported no significant snoring.  Sleep on your side, work on weight loss, low-carb low-fat diet.  Increase physical activity and we will see what labs show.    Triple pigmented moles and family history of skin cancer refer to dermatology for skin check.    History of trouble concentrating in the past reports no depression or anxiety.  Referred for diagnostic eval and after several months did get evaluation records are not available but reports was told had a neurocognitive processing disorder related to processing speed.  Has had no trouble taking tests in the past.  Instead procrastination was the bigger issue.  Will refer to community psychiatry to further evaluate and treat    History of COVID treated with Paxil weight in September 2022 with no residual symptoms.    Return in 3 months for blood pressure and asthma check and earlier as needed.    Preventive Health Recommendations  Male Ages 26 - 39    Yearly exam:             See your health care provider every  year in order to  o   Review health changes.   o   Discuss preventive care.    o   Review your medicines if your doctor has prescribed any.  You should be tested each year for STDs (sexually transmitted diseases), if you re at risk.   After age 35, talk to your provider about cholesterol testing. If you are at risk for heart disease, have your cholesterol tested at least every 5 years.   If you are at risk for diabetes, you should have a diabetes test (fasting glucose).  Shots: Get a flu shot each year. Get a tetanus shot every 10 years.     Nutrition:  Eat at least 5 servings of fruits and vegetables daily.   Eat whole-grain bread, whole-wheat pasta and brown rice instead of white grains and rice.   Get adequate Calcium and Vitamin D.     Lifestyle  Exercise for at least 150 minutes a week (30 minutes a day, 5 days a week). This will help you control your weight and prevent disease.   Limit alcohol to one drink per day.   No smoking.   Wear sunscreen to prevent skin cancer.   See your dentist every six months for an exam and cleaning.

## 2023-01-30 NOTE — PROGRESS NOTES
SUBJECTIVE:   CC: David is an 27 year old who presents for preventative health visit.     Patient has been advised of split billing requirements and indicates understanding: Yes  Healthy Habits:     Getting at least 3 servings of Calcium per day:  Yes    Bi-annual eye exam:  Yes    Dental care twice a year:  Yes    Sleep apnea or symptoms of sleep apnea:  None    Diet:  Regular (no restrictions)    Frequency of exercise:  2-3 days/week    Duration of exercise:  30-45 minutes    Taking medications regularly:  Yes    Medication side effects:  None    PHQ-2 Total Score: 0    Additional concerns today:  No    BACKGROUND  27 yr old with BMI > 34, HTN on no meds, moderate persistent asthma on wixela and albuterol prn, hx of elevated uric acid/ gout with tophi on allopurinol & Indocin prn,  Decreased concentration, Seen in 2017 for left AC separation s/p surgical repair, 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  [place]  negative.    Moved to [place] in 2018, no prior records available, no regular care prior to that, Seen in urgent care 2019 for toe pain due to history of gout treated with prednisone, tested positive for COVID in 2020 and 2021.  Seen once in [place] on 2021 in urgent care for left toe pain and prior history of gout and given prednisone for 5 days.    Seen first time 22 to establish care and for preventive health and additional concerns. Advised  Self testicular check regularly. Labs done. Health care maintenance reviewed. Discussed working on healthcare directives.  Was given the Flu & pneumonia vaccines. He was to clarify when he last had Tdap & noted Covid vaccine x3 up-to-date.  Asthma was uncontrolled as had run out of his controller meds 6  months prior, noting shortness of breath with activity limiting exercise, resumed generic Wixela  250/50, 1 puff twice a day & if not better can do lung function tests and refer to pulmonary.   Noted Elevated blood pressure could be white coat hypertension & or  due to weight.  Recommended a low-salt diet, limiting alcohol to 1 drink a day no more than 7 total a week, 10% of total body weight loss & increasing aerobic activity, encouraged to katrina a home blood pressure machine and checking it at different times different days and calling us if it was consistently more than 130 x 80.  If persistently elevated despite lifestyle changes then to consider medications. Discussed BMI, diet, exercise weight loss, avoiding marijuana as much as possible as this could increase weight gain over time  Reviewed his history of gout diagnosed clinically based on toe pain in the past and family history of gout in dad.  Recent joint pain had resolved.  Had made some recent lifestyle & dietary changes. Would avoid as needed prednisone as much as possible given long-term side effect (can worsen weight gain increases risk of diabetes, ulcers), may use indomethacin he has at home as needed.  Always try to use the lowest dose for the shortest duration of time as Indocin like other NSAID's increased risk of GI bleeding and heart attack.  Other options could be colchicine prn.  Side effects would be nausea vomiting diarrhea etc.  We can also consider referral to rheumatologist if necessary.  History of trouble focusing now affecting job: referred to a psychologist for diagnostic evaluation of ADHD.  Encouraged to avoid all marijuana prior to evaluation so they can get an unbiased evaluation.  Based on results could make recommendations for either skill base treatment and/or referral to a community psychiatrist for medication management.  Some medications like stimulants could make blood pressure worse so this might be tricky if his blood  pressure remained elevated. Was to return in 1 month for blood pressure check, asthma follow-up etc.   Lab showed a normal cbc, TSH, HBA1c, Micro albumin, lipids other than elevated TG and uric acd was elevated at 8.1 & allopurinol started.  Seen  3/3/2022  & noted Asthma was better. ACT up from 13 to 17. Had only restarted generic Advair 1.5 weeks prior and expected to further improve with time. Continued on generic Advair 1 puff twice a day ( turned out with his new insurance was cheaper than wixela previously given). Had been on Advair before with good control in the past. Encouraged not to use albuterol first thing in am but use the Advair instead then see if albuterol needed. Goal was to get to point where albuterol only used as rescue and one inhaler last 1 year preferably.  Encouraged to use albuterol prior to exercise if indicated. Lung function tests ordered. Felt no need for pulmonary referral yet. Encouraged to avoid vaping/ inhaling cannabis to protect lungs  BP was better, & advised to continue with low salt, low alcohol diet, there was no indication to check an ultrasound or do labs to search for secondary causes of elevated BP at the time. BMI had improved, was to continue to work on diet, exercise and loosing 10 % of total body weight over the year and rechecking TG at next physical.  Gout noted stable on allopurinol 100 mg, uric acid level checked to see if dose needed adjustment, was to continue with a hypouricemic diet ( low red meat, low alcohol). Use indomethacin sparingly as could raise BP( not required in a while) & felt no indication currently to see a rheumatologist   Noted his diagnostic eval for decreased concentration was scheduled in May outside [place]. Tdap was  given & advised to check in virtually in 4 weeks or so for asthma check in.  BMP came back normal and uric acid was down from 8.1 to 7.6 and continued on allopurinol 100 mg daily.    Seen virtually 4/8/22 noted asthma was  doing better. Continued on Advair twice a day & refill sent in 1 yr, to Use albuterol as rescue. Had not scheduled PFT's yet. Felt no indication to see a pulmonologist. Gout was stable on allopurinol 100 mg daily. Uric acid improved from original. Goal was eventually keep it less than 6.5 to prevent attacks. Was to continue with a low uric acid level in diet ( diet / lifestyle measures). Was to get a diagnostic assessment for ADHD eval in may. Advised to return in 5 months for asthma follow up.     No follow up made. Treated for Covid in sept with paxlovid. Noted albuterol inhaler refilled 1/24/22, 2/3/22, 5/27/22, 6/29/22,8/11/22, 9/2/22, 10/22/22 & 1/19/23 and advised follow up.     CURRENTLY  Here for a physical  No  concerns  HM reviewed  No ACP on file, honoring choices given  Flu shot utd  Covid x 4  Due Prevnar 20 will get today.  Thinks had hep B vaccines as a child in [place] and will try to get us records & hold off on immune testing.    Asthma: ACT =19. Back on Advair 250/50 1 puff 2/ day, rinsing mouth after using Advair  Noted albuterol inhaler filled 8 times since last jan. Reports lost inhaler at least 3 times requiring renewals and earlier past year was not on a controller inhaler.had used daily and now to few times a week.  Using few times a week now at least 3 year   Previously using it daily   Lost inhaler sfew times accounting for renewals  Has Three refills left on Advair left   Has enough albuterol just filled ( 8th one in past 12 months )   Not scheduled pfts yet. Hold off on pulmonary referral    Gout: no symptoms / attack in past yr, . On allopurinol 100 mg daily. Due for uric acid   Misses one day now and then. Fh gout . Not had to use indocin at home. Reports low red meat and alcohol in diet.    BP high consistently. BMI > 34m has daily coffee, unknown Salt intake. Pans to Work on weight loss & Wait in med. There is Fh of BP, hld and IHD. No known snoring. Rechcek BP still up at  140/80. Drinks alcohol 3 to 5/ week    BMI 35 dicussed. Will do labs. Does vape THC prn    Recovered from Covid sept     Able to get eval for ADHD after 6 months of waiting. Went through whole process at [facility] Dx came away with it was nothing revealing . Reports was told had a Neurocognitive processing disorder related to processing speed. No records available. Felt always good at taking tests & Never felt would benefit form extra time on tests or tasks. His problem is he tends to procrastinate. Ok with referral to psyche. understands if stimulants indicated may be contraindicated given his BP.    Today's PHQ-2 Score:   PHQ-2 ( 1999 Pfizer) 1/23/2023   Q1: Little interest or pleasure in doing things 0   Q2: Feeling down, depressed or hopeless 0   PHQ-2 Score 0   Q1: Little interest or pleasure in doing things Not at all   Q2: Feeling down, depressed or hopeless Not at all   PHQ-2 Score 0     Social History     Tobacco Use     Smoking status: Never     Smokeless tobacco: Never   Substance Use Topics     Alcohol use: Yes     Comment: 3 ot 5 a week     If you drink alcohol do you typically have >3 drinks per day or >7 drinks per week? No    Alcohol Use 1/23/2023   Prescreen: >3 drinks/day or >7 drinks/week? No   Prescreen: >3 drinks/day or >7 drinks/week? -     Last PSA: No results found for: PSA    Reviewed orders with patient. Reviewed health maintenance and updated orders accordingly - Yes  Lab work is in process  Labs reviewed in EPIC  BP Readings from Last 3 Encounters:   01/30/23 (!) 140/80   03/03/22 120/60   01/24/22 (!) 146/78    Wt Readings from Last 3 Encounters:   01/30/23 126.6 kg (279 lb)   03/03/22 121.7 kg (268 lb 3.2 oz)   01/24/22 124.3 kg (274 lb)                  Patient Active Problem List   Diagnosis     Elevated blood pressure reading without diagnosis of hypertension     BMI 34.0-34.9,adult     Moderate persistent asthma without complication     Chronic idiopathic gout     Family history of  gout     Past Surgical History:   Procedure Laterality Date      CIRCUMCISION       SHOULDER SURGERY Left 2017    left shoulder AC grade 5 separation repair     WISDOM TOOTH EXTRACTION         Social History     Tobacco Use     Smoking status: Never     Smokeless tobacco: Never   Substance Use Topics     Alcohol use: Yes     Comment: 3 ot 5 a week     Family History   Problem Relation Age of Onset     Thyroid Disease Mother      Gout Father      Aortic Valve Replacement Father      Hypertension Father      Hyperlipidemia Father      Asthma Father      Kidney Disease Maternal Grandmother         had one kidney removed du eto disease     Breast Cancer Maternal Aunt 40     Breast Cancer Other          Current Outpatient Medications   Medication Sig Dispense Refill     albuterol (PROAIR HFA/PROVENTIL HFA/VENTOLIN HFA) 108 (90 Base) MCG/ACT inhaler Inhale 2 puffs into the lungs every 6 hours as needed for shortness of breath 6.7 g 0     allopurinol (ZYLOPRIM) 100 MG tablet Take 1 tablet (100 mg) by mouth daily 90 tablet 1     fluticasone-salmeterol (ADVAIR) 250-50 MCG/DOSE inhaler Inhale 1 puff into the lungs every 12 hours 3 each 3     indomethacin (INDOCIN) 25 MG capsule Take 25 mg by mouth as needed       Allergies   Allergen Reactions     Minocycline Rash     GIVEN FOR ACNE WHEN A TEENAGER AND BROKE OUT IN [place]     Recent Labs   Lab Test 23  1444 22  0952 22  1242   A1C 5.3  --  5.4   LDL 87  --  84   HDL 39*  --  44   TRIG 285*  --  184*   ALT 27  --  30   CR 0.86 0.86 0.84   GFRESTIMATED >90 >90 >90   POTASSIUM 4.5 4.1 4.3   TSH 1.95  --  1.57        Reviewed and updated as needed this visit by clinical staff   Tobacco  Allergies  Meds  Problems  Med Hx  Surg Hx  Fam Hx  Soc   Hx        Reviewed and updated as needed this visit by Provider   Tobacco  Allergies  Meds  Problems  Med Hx  Surg Hx  Fam Hx  Soc   Hx       Past Medical History:   Diagnosis Date     Chronic  "idiopathic gout involving toe without tophus     dx age 22 toe swleling,     Moderate persistent asthma without complication     dx in middle school      Past Surgical History:   Procedure Laterality Date      CIRCUMCISION       SHOULDER SURGERY Left 2017    left shoulder AC grade 5 separation repair     WISDOM TOOTH EXTRACTION       OB History   No obstetric history on file.       Review of Systems   Constitutional: Negative for chills and fever.   HENT: Negative for congestion, ear pain, hearing loss and sore throat.    Eyes: Negative for pain and visual disturbance.   Respiratory: Negative for cough and shortness of breath.    Cardiovascular: Negative for chest pain, palpitations and peripheral edema.   Gastrointestinal: Negative for abdominal pain, constipation, diarrhea, heartburn, hematochezia and nausea.   Genitourinary: Negative for dysuria, frequency, genital sores, hematuria, impotence, penile discharge and urgency.   Musculoskeletal: Negative for arthralgias, joint swelling and myalgias.   Skin: Negative for rash.   Neurological: Negative for dizziness, weakness, headaches and paresthesias.   Psychiatric/Behavioral: Negative for mood changes. The patient is not nervous/anxious.        OBJECTIVE:   BP (!) 140/80 (BP Location: Right arm, Patient Position: Sitting, Cuff Size: Adult Large)   Pulse 77   Temp 97.2  F (36.2  C) (Tympanic)   Resp 18   Ht 1.882 m (6' 2.11\")   Wt 126.6 kg (279 lb)   SpO2 98%   BMI 35.71 kg/m      Physical Exam  GENERAL: healthy, alert, no distress and obese  EYES: Eyes grossly normal to inspection, PERRL and conjunctivae and sclerae normal, glasses  HENT: ear canals and TM's normal, nose and mouth without ulcers or lesions  NECK: no adenopathy, no asymmetry, masses, or scars and thyroid normal to palpation  RESP: lungs clear to auscultation - no rales, rhonchi or wheezes  CV: regular rate and rhythm, normal S1 S2, no S3 or S4, no murmur, click or rub, no peripheral " edema and peripheral pulses strong  ABDOMEN: soft, non tender, no hepatosplenomegaly, no masses and bowel sounds normal  MS: no gross musculoskeletal defects noted, no edema  SKIN: no suspicious lesions or rashes, multiple pigmented nevi  NEURO: Normal strength and tone, mentation intact and speech normal  PSYCH: mentation appears normal, affect normal/bright    Diagnostic Test Results:  Labs reviewed in Epic  No results found for this or any previous visit (from the past 24 hour(s)).  Results for orders placed or performed in visit on 01/30/23   Comprehensive metabolic panel (BMP + Alb, Alk Phos, ALT, AST, Total. Bili, TP)     Status: Normal   Result Value Ref Range    Sodium 142 136 - 145 mmol/L    Potassium 4.5 3.4 - 5.3 mmol/L    Chloride 105 98 - 107 mmol/L    Carbon Dioxide (CO2) 24 22 - 29 mmol/L    Anion Gap 13 7 - 15 mmol/L    Urea Nitrogen 13.6 6.0 - 20.0 mg/dL    Creatinine 0.86 0.67 - 1.17 mg/dL    Calcium 10.0 8.6 - 10.0 mg/dL    Glucose 97 70 - 99 mg/dL    Alkaline Phosphatase 42 40 - 129 U/L    AST 27 10 - 50 U/L    ALT 27 10 - 50 U/L    Protein Total 7.0 6.4 - 8.3 g/dL    Albumin 4.7 3.5 - 5.2 g/dL    Bilirubin Total 0.4 <=1.2 mg/dL    GFR Estimate >90 >60 mL/min/1.73m2   Lipid Profile (Chol, Trig, HDL, LDL calc)     Status: Abnormal   Result Value Ref Range    Cholesterol 183 <200 mg/dL    Triglycerides 285 (H) <150 mg/dL    Direct Measure HDL 39 (L) >=40 mg/dL    LDL Cholesterol Calculated 87 <=100 mg/dL    Non HDL Cholesterol 144 (H) <130 mg/dL    Narrative    Cholesterol  Desirable:  <200 mg/dL    Triglycerides  Normal:  Less than 150 mg/dL  Borderline High:  150-199 mg/dL  High:  200-499 mg/dL  Very High:  Greater than or equal to 500 mg/dL    Direct Measure HDL  Female:  Greater than or equal to 50 mg/dL   Male:  Greater than or equal to 40 mg/dL    LDL Cholesterol  Desirable:  <100mg/dL  Above Desirable:  100-129 mg/dL   Borderline High:  130-159 mg/dL   High:  160-189 mg/dL   Very High:  >=  190 mg/dL    Non HDL Cholesterol  Desirable:  130 mg/dL  Above Desirable:  130-159 mg/dL  Borderline High:  160-189 mg/dL  High:  190-219 mg/dL  Very High:  Greater than or equal to 220 mg/dL   TSH with free T4 reflex     Status: Normal   Result Value Ref Range    TSH 1.95 0.30 - 4.20 uIU/mL   Hemoglobin A1c     Status: Normal   Result Value Ref Range    Hemoglobin A1C 5.3 0.0 - 5.6 %   Uric acid     Status: Abnormal   Result Value Ref Range    Uric Acid 7.8 (H) 3.4 - 7.0 mg/dL   Albumin Random Urine Quantitative with Creat Ratio     Status: None   Result Value Ref Range    Creatinine Urine mg/dL 159.0 mg/dL    Albumin Urine mg/L <12.0 mg/L    Albumin Urine mg/g Cr     CBC with platelets and differential     Status: None   Result Value Ref Range    WBC Count 6.8 4.0 - 11.0 10e3/uL    RBC Count 4.72 4.40 - 5.90 10e6/uL    Hemoglobin 13.6 13.3 - 17.7 g/dL    Hematocrit 40.8 40.0 - 53.0 %    MCV 86 78 - 100 fL    MCH 28.8 26.5 - 33.0 pg    MCHC 33.3 31.5 - 36.5 g/dL    RDW 12.8 10.0 - 15.0 %    Platelet Count 280 150 - 450 10e3/uL    % Neutrophils 53 %    % Lymphocytes 29 %    % Monocytes 8 %    % Eosinophils 10 %    % Basophils 1 %    % Immature Granulocytes 0 %    Absolute Neutrophils 3.6 1.6 - 8.3 10e3/uL    Absolute Lymphocytes 1.9 0.8 - 5.3 10e3/uL    Absolute Monocytes 0.5 0.0 - 1.3 10e3/uL    Absolute Eosinophils 0.7 0.0 - 0.7 10e3/uL    Absolute Basophils 0.1 0.0 - 0.2 10e3/uL    Absolute Immature Granulocytes 0.0 <=0.4 10e3/uL   CBC with platelets and differential     Status: None    Narrative    The following orders were created for panel order CBC with platelets and differential.  Procedure                               Abnormality         Status                     ---------                               -----------         ------                     CBC with platelets and d...[[id number]]                      Final result                 Please view results for these tests on the individual orders.        ASSESSMENT/PLAN:       ICD-10-CM    1. Routine history and physical examination of adult  Z00.00 PNEUMOCOCCAL 20 VALENT CONJUGATE (PREVNAR 20)     Adult Dermatology Referral      2. Moderate persistent asthma without complication  J45.40 PNEUMOCOCCAL 20 VALENT CONJUGATE (PREVNAR 20)     Asthma Action Plan (AAP)     CBC with platelets and differential     General PFT Lab (Please always keep checked)     PRIMARY CARE FOLLOW-UP SCHEDULING     CBC with platelets and differential      3. Chronic idiopathic gout  [id number].00X0 CBC with platelets and differential     Uric acid     allopurinol (ZYLOPRIM) 100 MG tablet     CBC with platelets and differential     Uric acid      4. Family history of gout  Z82.69 CBC with platelets and differential     Uric acid     CBC with platelets and differential     Uric acid      5. Hypertension, unspecified type  I10 Comprehensive metabolic panel (BMP + Alb, Alk Phos, ALT, AST, Total. Bili, TP)     Lipid Profile (Chol, Trig, HDL, LDL calc)     TSH with free T4 reflex     Albumin Random Urine Quantitative with Creat Ratio     EKG 12-lead complete w/read - Clinics     PRIMARY CARE FOLLOW-UP SCHEDULING     Home Blood Pressure Monitor Order for DME - ONLY FOR DME     Comprehensive metabolic panel (BMP + Alb, Alk Phos, ALT, AST, Total. Bili, TP)     Lipid Profile (Chol, Trig, HDL, LDL calc)     TSH with free T4 reflex     Albumin Random Urine Quantitative with Creat Ratio      6. BMI 34.0-34.9,adult  Z68.34 Comprehensive metabolic panel (BMP + Alb, Alk Phos, ALT, AST, Total. Bili, TP)     Lipid Profile (Chol, Trig, HDL, LDL calc)     TSH with free T4 reflex     Hemoglobin A1c     Comprehensive metabolic panel (BMP + Alb, Alk Phos, ALT, AST, Total. Bili, TP)     Lipid Profile (Chol, Trig, HDL, LDL calc)     TSH with free T4 reflex     Hemoglobin A1c      7. Multiple pigmented nevi  D22.9 Adult Dermatology Referral      8. Family history of skin cancer  Z80.8 Adult Dermatology Referral       9. Disturbed concentration  R41.840 CBC with platelets and differential     TSH with free T4 reflex     Adult Mental Health  Referral     CBC with platelets and differential     TSH with free T4 reflex    Reports had an evaluation that stated he had a Neurocognitive processing disorder related to processing speed       10. History of 2019 novel coronavirus disease (COVID-19)  Z86.16       11. Health care maintenance  Z00.00 REVIEW OF HEALTH MAINTENANCE PROTOCOL ORDERS      12. Advanced directives, counseling/discussion  Z71.89       13. Need for hepatitis B vaccination  Z23     thinks had hep b as a child, will serach records first in [place] with mom       14. Need for pneumococcal vaccination  Z23 PNEUMOCOCCAL 20 VALENT CONJUGATE (PREVNAR 20)        Seen for preventive health and additional concerns today   Self testicular check regularly   Labs today and will make further recommendations once reviewed  Healthcare maintenance reviewed.  Does not have healthcare directives and honoring choices given to review.  Flu shot up-to-date.  COVID-vaccine primary and booster series up-to-date.  Recommend pneumonia vaccine Prevnar 20 and given today.  Recommend hepatitis B vaccination feels may have had it when younger will check records with mom from [place] where grew up and if unable to find can do immune testing in the future and go from there.    Moderate persistent asthma currently improved from a year ago but still not at goal.  Continue Advair 250/50 1 puff twice daily rinse mouth well after using inhaler.  Limit use of albuterol as rescue only, has been filled 8 times in the last year which suggests high use & need for better asthma control. Initially filling freq due to not being on Advair then due to losing inhalers at least 3 different times.  This is still though a high amount of rescue inhaler use.  Recheck asthma in 3 months.  Encouraged to schedule lung function tests  In the meantime  encouraged to avoid vaping and work on weight loss which may help.  If asthma scores continue to be less than 20 consider changing Advair to Breo, & seeing a pulmonologist to further evaluate and treat.  Asthma action plan verbally in place.  Prevnar 20 given.  Noted has 3 refills on Advair currently and albuterol recently refilled so he will contact us when these are needed.    Chronic idiopathic gout and family history of gout currently gout asymptomatic without any flares on allopurinol 100 mg daily.  Has not required indomethacin use in a long time.  Continue to stay well-hydrated, drink alcohol in moderation, decrease red meat consumption and continue current dosing of allopurinol and will check uric acid levels and make further recommendations.  Refill sent in.     New dx of HTN made. Blood pressure elevated last year and again this year.  Recheck better but still borderline.  Could be due to whitecoat hypertension/anxiety.  Does have family history of hypertension and genetic predisposition for this. Opted to hold off on medication for now.  EKG does not show any effect of blood pressure on the heart.  We will check lab work to make sure there is no endorgan damage from blood pressure.  Recommend a low-salt diet, low alcohol intake, low caffeine intake, increase physical activity, at least 5 to 10% of total body weight loss to make a difference in blood pressure, avoid anti-inflammatories as much as possible, given blood pressure would be hesitant to be prescribing any stimulants but can discuss that more with a psychiatrist when sees them.  Prescription given for home blood pressure machine.  To check blood pressure at home if consistently over 130 x 80 may benefit from medication.  To return in 3 months for blood pressure check and if still elevated consider medication at that time.    BMI more than 35, reported no significant snoring.  Sleep on side, work on weight loss, low-carb low-fat diet.  Increase  "physical activity and we will see what labs show.    Multiple pigmented moles and family history of skin cancer refer to dermatology for skin check.    History of trouble concentrating in the past reports no depression or anxiety.  Referred for diagnostic eval and after several months did get an evaluation at [facility], records are not available but reports was told had a neurocognitive processing disorder related to processing speed.  Has had no trouble taking tests in the past.  Instead procrastination was the bigger issue. Will refer to community psychiatry to further evaluate and treat    History of COVID treated with Paxlovid in 2022 with no residual symptoms.    Return in 3 months for blood pressure and asthma check and earlier as needed.    Patient has been advised of split billing requirements and indicates understanding: Yes      COUNSELING:   Reviewed preventive health counseling, as reflected in patient instructions       Regular exercise       Healthy diet/nutrition       Vision screening       Immunizations    Vaccinated for: Pneumococcal         Alcohol Use        Safe sex practices/STD prevention       Consider Hep C screening for all patients one time for ages 18-79 years       HIV screeninx in teen years, 1x in adult years, and at intervals if high risk       One time pneumovax for smokers       The ASCVD Risk score (Gisela STEVEN, et al., 2019) failed to calculate for the following reasons:    The 2019 ASCVD risk score is only valid for ages 40 to 79       Advance Care Planning      BMI:   Estimated body mass index is 35.71 kg/m  as calculated from the following:    Height as of this encounter: 1.882 m (6' 2.11\").    Weight as of this encounter: 126.6 kg (279 lb).   Weight management plan: Discussed healthy diet and exercise guidelines      He reports that he has never smoked. He has never used smokeless tobacco.    Mariana Sherman MD  [facility]  "

## 2023-01-30 NOTE — RESULT ENCOUNTER NOTE
Ruth Mr. Martin,  Some of your results came back and are within acceptable limits. -Normal red blood cell (hgb) levels, normal white blood cell count and normal platelet levels.  -A1C (diabetic test) is normal and indicates that your blood sugar has been in a normal range the last 3 months.. If you have any further concerns please do not hesitate to contact us by message, phone or making an appointment.  Have a good day   Dr Lane MORAN

## 2023-01-30 NOTE — NURSING NOTE
Prior to immunization administration, verified patients identity using patient s name and date of birth. Please see Immunization Activity for additional information.     Screening Questionnaire for Adult Immunization    Are you sick today?   No   Do you have allergies to medications, food, a vaccine component or latex?   No   Have you ever had a serious reaction after receiving a vaccination?   No   Do you have a long-term health problem with heart, lung, kidney, or metabolic disease (e.g., diabetes), asthma, a blood disorder, no spleen, complement component deficiency, a cochlear implant, or a spinal fluid leak?  Are you on long-term aspirin therapy?   Yes   Do you have cancer, leukemia, HIV/AIDS, or any other immune system problem?   No   Do you have a parent, brother, or sister with an immune system problem?   No   In the past 3 months, have you taken medications that affect  your immune system, such as prednisone, other steroids, or anticancer drugs; drugs for the treatment of rheumatoid arthritis, Crohn s disease, or psoriasis; or have you had radiation treatments?   No   Have you had a seizure, or a brain or other nervous system problem?   No   During the past year, have you received a transfusion of blood or blood    products, or been given immune (gamma) globulin or antiviral drug?   No   For women: Are you pregnant or is there a chance you could become       pregnant during the next month?   No   Have you received any vaccinations in the past 4 weeks?   No     Immunization questionnaire was positive for at least one answer.  Notified DR Lam        Per orders of Dr. Sherman  injection of  PVC20 given by John Wilder MA. Patient instructed to remain in clinic for 15 minutes afterwards, and to report any adverse reaction to me immediately.       Screening performed by John Wilder MA on 1/30/2023 at 2:20 PM.

## 2023-01-31 ENCOUNTER — TELEPHONE (OUTPATIENT)
Dept: FAMILY MEDICINE | Facility: CLINIC | Age: 28
End: 2023-01-31
Payer: COMMERCIAL

## 2023-01-31 NOTE — RESULT ENCOUNTER NOTE
Ruth Mr. Martin,  Some of your results came back and are within acceptable limits. -Microalbumin (urine protein) test is normal.  ADVISE: rechecking this annually.. If you have any further concerns please do not hesitate to contact us by message, phone or making an appointment.  Have a good day   Dr Lane MORAN

## 2023-01-31 NOTE — RESULT ENCOUNTER NOTE
Ruth Mr. Martin,  Some of your results came back showing  . -HDL(good) cholesterol level is low and your triglycerides are elevated which can increase your heart disease risk.  A diet high in fat and simple carbohydrates, genetics and being overweight can contribute to this. LDL(bad) cholesterol level is normal.  ADVISE:exercising 150 minutes of aerobic exercise per week (30 minutes 5 days per week or 50 minutes 3 days per week are options), and omega-3 fatty acids (fish oil) 6821-8225 mg daily are helpful to improve this.  In 12 months, you should check your fasting cholesterol panel.  -Liver and gallbladder tests are normal (ALT,AST, Alk phos, bilirubin), kidney function is normal (Cr, GFR), sodium is normal, potassium is normal, calcium is normal, glucose is normal.  -TSH (thyroid stimulating hormone) level is normal which indicates normal thyroid function.  Uric acid elevated at 7.8  If not having any symptoms continue allopurinol 100 mg daily . Goal ideally should be less than 6.5 so if would like to we can also go up on the dose and recheck,  let me know what option you would like to do.   If you have any further concerns please do not hesitate to contact us by message, phone or making an appointment.  Have a good day   Dr Lane MORAN

## 2023-01-31 NOTE — TELEPHONE ENCOUNTER
----- Message from Ryan Dorman sent at 1/31/2023  9:24 AM CST -----    We did not reach David МАРИЯ Martin, we left a message with our contact number [phone].  If we do not hear from them within the next 3 business days we will mail a letter offering our scheduling services.    If they do call to schedule, in the future, we will inform you of the outcome.    Thank you for your referral,  [facility]

## 2023-02-21 DIAGNOSIS — J45.40 MODERATE PERSISTENT ASTHMA WITHOUT COMPLICATION: ICD-10-CM

## 2023-02-24 RX ORDER — ALBUTEROL SULFATE 90 UG/1
2 AEROSOL, METERED RESPIRATORY (INHALATION) EVERY 6 HOURS PRN
Qty: 6.7 G | Refills: 0 | Status: SHIPPED | OUTPATIENT
Start: 2023-02-24 | End: 2023-04-28

## 2023-02-24 NOTE — TELEPHONE ENCOUNTER
Seems to be using albuterol rescue inhaler very frequently mening asthma not controlled. Recommend pulmonary consult & please schedule lung functon tests ordered previousy

## 2023-02-24 NOTE — TELEPHONE ENCOUNTER
Watson Brown message sent to patient.    CHARLIE Wise, RN-BC  [facility]

## 2023-02-24 NOTE — TELEPHONE ENCOUNTER
Routing refill request to provider for review/approval because:  ACT < 20    Last Written Prescription Date:  1/19/23  Last Fill Quantity: 6.7 g,  # refills: 0   Last office visit: 1/30/2023 with prescribing provider:     Future Office Visit:   Next 5 appointments (look out 90 days)    Apr 28, 2023  9:10 AM  (Arrive by 8:50 AM)  Provider Visit with Mariana Sherman MD  [facility] ([facility] ) [address] [place] [address] [phone]

## 2023-04-28 ENCOUNTER — OFFICE VISIT (OUTPATIENT)
Dept: FAMILY MEDICINE | Facility: CLINIC | Age: 28
End: 2023-04-28
Payer: COMMERCIAL

## 2023-04-28 VITALS
SYSTOLIC BLOOD PRESSURE: 132 MMHG | RESPIRATION RATE: 18 BRPM | TEMPERATURE: 98.3 F | HEIGHT: 74 IN | OXYGEN SATURATION: 97 % | DIASTOLIC BLOOD PRESSURE: 72 MMHG | HEART RATE: 81 BPM | BODY MASS INDEX: 34.52 KG/M2 | WEIGHT: 269 LBS

## 2023-04-28 DIAGNOSIS — M1A.00X0 CHRONIC IDIOPATHIC GOUT: ICD-10-CM

## 2023-04-28 DIAGNOSIS — E83.52 SERUM CALCIUM ELEVATED: ICD-10-CM

## 2023-04-28 DIAGNOSIS — Z80.8 FAMILY HISTORY OF SKIN CANCER: ICD-10-CM

## 2023-04-28 DIAGNOSIS — I10 HYPERTENSION, UNSPECIFIED TYPE: ICD-10-CM

## 2023-04-28 DIAGNOSIS — Z23 NEED FOR HEPATITIS B VACCINATION: ICD-10-CM

## 2023-04-28 DIAGNOSIS — E78.5 DYSLIPIDEMIA: ICD-10-CM

## 2023-04-28 DIAGNOSIS — Z01.84 IMMUNITY STATUS TESTING: ICD-10-CM

## 2023-04-28 DIAGNOSIS — D22.9 MULTIPLE PIGMENTED NEVI: ICD-10-CM

## 2023-04-28 DIAGNOSIS — J45.40 MODERATE PERSISTENT ASTHMA WITHOUT COMPLICATION: Primary | ICD-10-CM

## 2023-04-28 DIAGNOSIS — R41.840 DISTURBED CONCENTRATION: ICD-10-CM

## 2023-04-28 PROBLEM — R03.0 ELEVATED BLOOD PRESSURE READING WITHOUT DIAGNOSIS OF HYPERTENSION: Status: RESOLVED | Noted: 2022-01-24 | Resolved: 2023-04-28

## 2023-04-28 LAB
ANION GAP SERPL CALCULATED.3IONS-SCNC: 13 MMOL/L (ref 7–15)
BUN SERPL-MCNC: 13.8 MG/DL (ref 6–20)
CALCIUM SERPL-MCNC: 10.4 MG/DL (ref 8.6–10)
CHLORIDE SERPL-SCNC: 102 MMOL/L (ref 98–107)
CREAT SERPL-MCNC: 0.91 MG/DL (ref 0.67–1.17)
DEPRECATED HCO3 PLAS-SCNC: 25 MMOL/L (ref 22–29)
GFR SERPL CREATININE-BSD FRML MDRD: >90 ML/MIN/1.73M2
GLUCOSE SERPL-MCNC: 97 MG/DL (ref 70–99)
HBV SURFACE AB SERPL IA-ACNC: 0.92 M[IU]/ML
HBV SURFACE AB SERPL IA-ACNC: NONREACTIVE M[IU]/ML
HBV SURFACE AG SERPL QL IA: NONREACTIVE
POTASSIUM SERPL-SCNC: 4.7 MMOL/L (ref 3.4–5.3)
SODIUM SERPL-SCNC: 140 MMOL/L (ref 136–145)
URATE SERPL-MCNC: 7.8 MG/DL (ref 3.4–7)

## 2023-04-28 PROCEDURE — 87340 HEPATITIS B SURFACE AG IA: CPT | Performed by: FAMILY MEDICINE

## 2023-04-28 PROCEDURE — 99215 OFFICE O/P EST HI 40 MIN: CPT | Performed by: FAMILY MEDICINE

## 2023-04-28 PROCEDURE — 86706 HEP B SURFACE ANTIBODY: CPT | Performed by: FAMILY MEDICINE

## 2023-04-28 PROCEDURE — 36415 COLL VENOUS BLD VENIPUNCTURE: CPT | Performed by: FAMILY MEDICINE

## 2023-04-28 PROCEDURE — 84550 ASSAY OF BLOOD/URIC ACID: CPT | Performed by: FAMILY MEDICINE

## 2023-04-28 PROCEDURE — 80048 BASIC METABOLIC PNL TOTAL CA: CPT | Performed by: FAMILY MEDICINE

## 2023-04-28 RX ORDER — FLUTICASONE PROPIONATE AND SALMETEROL 250; 50 UG/1; UG/1
1 POWDER RESPIRATORY (INHALATION) EVERY 12 HOURS
Qty: 3 EACH | Refills: 1 | Status: SHIPPED | OUTPATIENT
Start: 2023-04-28 | End: 2023-05-03

## 2023-04-28 RX ORDER — LISINOPRIL 10 MG/1
10 TABLET ORAL DAILY
Status: CANCELLED | OUTPATIENT
Start: 2023-04-28

## 2023-04-28 RX ORDER — FLUTICASONE FUROATE AND VILANTEROL 200; 25 UG/1; UG/1
1 POWDER RESPIRATORY (INHALATION) DAILY
Status: CANCELLED | OUTPATIENT
Start: 2023-04-28

## 2023-04-28 RX ORDER — ALBUTEROL SULFATE 90 UG/1
2 AEROSOL, METERED RESPIRATORY (INHALATION) EVERY 6 HOURS PRN
Qty: 18 G | Refills: 0 | Status: SHIPPED | OUTPATIENT
Start: 2023-04-28 | End: 2023-05-28

## 2023-04-28 ASSESSMENT — ASTHMA QUESTIONNAIRES
QUESTION_2 LAST FOUR WEEKS HOW OFTEN HAVE YOU HAD SHORTNESS OF BREATH: ONCE OR TWICE A WEEK
QUESTION_5 LAST FOUR WEEKS HOW WOULD YOU RATE YOUR ASTHMA CONTROL: WELL CONTROLLED
QUESTION_1 LAST FOUR WEEKS HOW MUCH OF THE TIME DID YOUR ASTHMA KEEP YOU FROM GETTING AS MUCH DONE AT WORK, SCHOOL OR AT HOME: NONE OF THE TIME
QUESTION_3 LAST FOUR WEEKS HOW OFTEN DID YOUR ASTHMA SYMPTOMS (WHEEZING, COUGHING, SHORTNESS OF BREATH, CHEST TIGHTNESS OR PAIN) WAKE YOU UP AT NIGHT OR EARLIER THAN USUAL IN THE MORNING: NOT AT ALL
QUESTION_4 LAST FOUR WEEKS HOW OFTEN HAVE YOU USED YOUR RESCUE INHALER OR NEBULIZER MEDICATION (SUCH AS ALBUTEROL): ONCE A WEEK OR LESS
ACT_TOTALSCORE: 22
ACT_TOTALSCORE: 22

## 2023-04-28 ASSESSMENT — PAIN SCALES - GENERAL: PAINLEVEL: NO PAIN (0)

## 2023-04-28 NOTE — PATIENT INSTRUCTIONS
Asthma better, continue advair 250/50 1 puff twice a day , rinse well after using. Use albuterol only for rescue, use has decreased which is a good sign. Schedule lung function tests. Refills sent in 6 months. See you back in 6 month. Hold off on pulmonary referral for now. Avoid vaping.    BP high , recheck better. Encouraged low salt, low caffeine, low alcohol in diet and increase regular physical activity and work to loose 10 % of total body weight. No meds for now. Recheck in 6 months. Encouraged to get a home blood pressure machine.  To check blood pressure at home if consistently over 130 x 80 may benefit from medication.     Dyslipidemia, work on diet, exercise, low carb, smaller portion,more plant based mediterranean diet and weight loss . No meds needed yet. Recheck in jan at next physical     Gout better asymptomatic on allopurinol 100 mg daily taking more consistently. No gout attacks recently will check uric acid. Has enough med till end of June, will wait to see labs to refill med in case dose needs changing. Avoid indocin and other NSAID'S as much as possible as they can raise BP    Schedule with derm for fh of skin cancer and multiple moles. Sun protection    Follow up mental health as planned for trouble concentrating and procrastination in august     Will check for hep B immunity as await mom to send us your childhood immunizations, if not immune consider revaccination against hep B    See you back in 6 months and earlier as needed

## 2023-04-29 ENCOUNTER — MYC MEDICAL ADVICE (OUTPATIENT)
Dept: FAMILY MEDICINE | Facility: CLINIC | Age: 28
End: 2023-04-29
Payer: COMMERCIAL

## 2023-04-29 DIAGNOSIS — J45.40 MODERATE PERSISTENT ASTHMA WITHOUT COMPLICATION: ICD-10-CM

## 2023-04-29 NOTE — RESULT ENCOUNTER NOTE
Hello Mr. Martin,  Your results came back and are show   -Kidney function (GFR) is normal.  -Sodium is normal.  -Potassium is normal.  -Calcium is elevated.  It could be from artifact. Meds should not be causing this. But need to rule out other causes ADVISE: rechecking this in 1 month. I'll leave an order in the chart for you to do in a lab only apt you can schedule.   -Glucose is normal.  Uric acid remains elevated at 7.8. reported not having any gout attacks. Continue allopurinol 100 mg daily. If having attacks or remains more tan 65 consider increasing dose to 200 mg daily. Let me know.  Testing for hepatitis B shows no infection but also not immune against Hep B. Recommend hep B vaccination series of three shots at 0, 2, & 4 month intervals. Can schedule with the nurse & can start same day as comes in for recheck lab if desired.   f you have any further concerns please do not hesitate to contact us by message, phone or making an appointment.  Have a good day   Dr Lane MORAN  (0) swallows foods and liquids w/o difficulty

## 2023-05-03 RX ORDER — FLUTICASONE PROPIONATE AND SALMETEROL 250; 50 UG/1; UG/1
1 POWDER RESPIRATORY (INHALATION) EVERY 12 HOURS
Qty: 1 EACH | Refills: 3 | Status: SHIPPED | OUTPATIENT
Start: 2023-05-03 | End: 2024-03-18

## 2023-05-03 NOTE — TELEPHONE ENCOUNTER
Writer responded via [employer] with update to Rx.   Aruna Whitehead, BSN RN  [facility]

## 2023-05-03 NOTE — TELEPHONE ENCOUNTER
Dr. Sherman --    Please send this RX for a 1 month supply in hopes it will go through insurance.     Thank you,   BRANDIE SinghN RN  [facility]

## 2023-05-15 ENCOUNTER — DOCUMENTATION ONLY (OUTPATIENT)
Dept: FAMILY MEDICINE | Facility: CLINIC | Age: 28
End: 2023-05-15
Payer: COMMERCIAL

## 2023-05-15 DIAGNOSIS — E79.0 HYPERURICEMIA: ICD-10-CM

## 2023-05-15 DIAGNOSIS — Z87.39 HISTORY OF GOUT: Primary | ICD-10-CM

## 2023-05-15 NOTE — PROGRESS NOTES
Patient coming for lab draw for calcium and uric acid, per patient.  There's orders for calcium and ionized calcium in the chart, but not uric acid.  Please advise/enter orders.  Thank you.

## 2023-05-27 DIAGNOSIS — J45.40 MODERATE PERSISTENT ASTHMA WITHOUT COMPLICATION: ICD-10-CM

## 2023-05-28 RX ORDER — ALBUTEROL SULFATE 90 UG/1
AEROSOL, METERED RESPIRATORY (INHALATION)
Qty: 18 G | Refills: 3 | Status: SHIPPED | OUTPATIENT
Start: 2023-05-28 | End: 2024-03-18

## 2023-05-28 NOTE — TELEPHONE ENCOUNTER
"Last Written Prescription Date:  4/28/23  Last Fill Quantity: 18,  # refills: 0   Last office visit provider:  4/28/23     Requested Prescriptions   Pending Prescriptions Disp Refills     VENTOLIN  (90 Base) MCG/ACT inhaler [Pharmacy Med Name: VENTOLIN HFA INH W/DOS CTR 200PUFFS] 18 g 0     Sig: INHALE 2 PUFFS INTO THE LUNGS EVERY 6 HOURS AS NEEDED FOR SHORTNESS OF BREATH       Asthma Maintenance Inhalers - Anticholinergics Passed - 5/27/2023  5:50 PM        Passed - Patient is age 12 years or older        Passed - Asthma control assessment score within normal limits in last 6 months     Please review ACT score.           Passed - Medication is active on med list        Passed - Recent (6 mo) or future (30 days) visit within the authorizing provider's specialty     Patient had office visit in the last 6 months or has a visit in the next 30 days with authorizing provider or within the authorizing provider's specialty.  See \"Patient Info\" tab in inbasket, or \"Choose Columns\" in Meds & Orders section of the refill encounter.           Short-Acting Beta Agonist Inhalers Protocol  Passed - 5/27/2023  5:50 PM        Passed - Patient is age 12 or older        Passed - Asthma control assessment score within normal limits in last 6 months     Please review ACT score.           Passed - Medication is active on med list        Passed - Recent (6 mo) or future (30 days) visit within the authorizing provider's specialty     Patient had office visit in the last 6 months or has a visit in the next 30 days with authorizing provider or within the authorizing provider's specialty.  See \"Patient Info\" tab in inbasket, or \"Choose Columns\" in Meds & Orders section of the refill encounter.                 Aruna Donnelly RN 05/28/23 3:54 PM  "

## 2023-06-16 ENCOUNTER — LAB (OUTPATIENT)
Dept: LAB | Facility: CLINIC | Age: 28
End: 2023-06-16
Payer: COMMERCIAL

## 2023-06-16 DIAGNOSIS — Z87.39 HISTORY OF GOUT: ICD-10-CM

## 2023-06-16 DIAGNOSIS — E79.0 HYPERURICEMIA: ICD-10-CM

## 2023-06-16 DIAGNOSIS — E83.52 SERUM CALCIUM ELEVATED: ICD-10-CM

## 2023-06-16 LAB
CA-I BLD-MCNC: 5 MG/DL (ref 4.4–5.2)
CALCIUM SERPL-MCNC: 10.1 MG/DL (ref 8.6–10)
URATE SERPL-MCNC: 7.6 MG/DL (ref 3.4–7)

## 2023-06-16 PROCEDURE — 84550 ASSAY OF BLOOD/URIC ACID: CPT

## 2023-06-16 PROCEDURE — 82310 ASSAY OF CALCIUM: CPT

## 2023-06-16 PROCEDURE — 82330 ASSAY OF CALCIUM: CPT

## 2023-06-16 PROCEDURE — 36415 COLL VENOUS BLD VENIPUNCTURE: CPT

## 2023-08-22 ENCOUNTER — VIRTUAL VISIT (OUTPATIENT)
Dept: PSYCHOLOGY | Facility: CLINIC | Age: 28
End: 2023-08-22
Payer: COMMERCIAL

## 2023-08-22 DIAGNOSIS — F12.20 CANNABIS DEPENDENCE (H): ICD-10-CM

## 2023-08-22 DIAGNOSIS — R41.840 ATTENTION AND CONCENTRATION DEFICIT: Primary | ICD-10-CM

## 2023-08-22 PROCEDURE — 90837 PSYTX W PT 60 MINUTES: CPT | Mod: VID | Performed by: COUNSELOR

## 2023-08-22 ASSESSMENT — COLUMBIA-SUICIDE SEVERITY RATING SCALE - C-SSRS
ATTEMPT LIFETIME: NO
TOTAL  NUMBER OF ABORTED OR SELF INTERRUPTED ATTEMPTS LIFETIME: NO
TOTAL  NUMBER OF INTERRUPTED ATTEMPTS LIFETIME: NO
2. HAVE YOU ACTUALLY HAD ANY THOUGHTS OF KILLING YOURSELF?: NO
1. HAVE YOU WISHED YOU WERE DEAD OR WISHED YOU COULD GO TO SLEEP AND NOT WAKE UP?: NO
6. HAVE YOU EVER DONE ANYTHING, STARTED TO DO ANYTHING, OR PREPARED TO DO ANYTHING TO END YOUR LIFE?: NO

## 2023-08-22 ASSESSMENT — ANXIETY QUESTIONNAIRES
4. TROUBLE RELAXING: MORE THAN HALF THE DAYS
1. FEELING NERVOUS, ANXIOUS, OR ON EDGE: MORE THAN HALF THE DAYS
IF YOU CHECKED OFF ANY PROBLEMS ON THIS QUESTIONNAIRE, HOW DIFFICULT HAVE THESE PROBLEMS MADE IT FOR YOU TO DO YOUR WORK, TAKE CARE OF THINGS AT HOME, OR GET ALONG WITH OTHER PEOPLE: SOMEWHAT DIFFICULT
7. FEELING AFRAID AS IF SOMETHING AWFUL MIGHT HAPPEN: NOT AT ALL
5. BEING SO RESTLESS THAT IT IS HARD TO SIT STILL: NOT AT ALL
3. WORRYING TOO MUCH ABOUT DIFFERENT THINGS: SEVERAL DAYS
2. NOT BEING ABLE TO STOP OR CONTROL WORRYING: SEVERAL DAYS
GAD7 TOTAL SCORE: 7
6. BECOMING EASILY ANNOYED OR IRRITABLE: SEVERAL DAYS
GAD7 TOTAL SCORE: 7

## 2023-08-22 NOTE — PROGRESS NOTES
"[facility]   Mental Health & Addiction Services     Progress Note - Initial Visit    Patient  Name:  David Martin Date: 23         Service Type: Individual     Visit Start Time: 1:00pm  Visit End Time: 2:00pm    Visit #: 1    Attendees: Client attended alone    Service Modality:  Video Visit:      Provider verified identity through the following two step process.  Patient provided:  Patient  and Patient address    Telemedicine Visit: The patient's condition can be safely assessed and treated via synchronous audio and visual telemedicine encounter.      Reason for Telemedicine Visit: Services only offered telehealth    Originating Site (Patient Location): Patient's home    Distant Site (Provider Location): Provider Remote Setting- Home Office    Consent:  The patient/guardian has verbally consented to: the potential risks and benefits of telemedicine (video visit) versus in person care; bill my insurance or make self-payment for services provided; and responsibility for payment of non-covered services.     Patient would like the video invitation sent by:  My Chart    Mode of Communication:  Video Conference via [employer]    Distant Location (Provider):  Off-site    As the provider I attest to compliance with applicable laws and regulations related to telemedicine.       DATA:   Interactive Complexity: No   Crisis: No  Extended Session (53+ minutes): PROLONGED SERVICE IN THE OUTPATIENT SETTING REQUIRING DIRECT (FACE-TO-FACE) PATIENT CONTACT BEYOND THE USUAL SERVICE:    - Treatment protocol required additional time to complete, due to the nature of diagnosis being treated.  See Interventions section for details     Presenting Concerns/  Current Stressors:   The reason for seeking services at this time is: \"Focus and anxiety issues\". Pt got tested for ADHD and was told he had Cognitive Processing Disorder instead. Pt is now seeking help with getting tools to manage this diagnosis. Pt has " managed his symptoms for years prior to his testing, and he thinks that it progressively has been impacting his work efficiency which makes him stressed out. Pt identified that procrastination is his biggest barrier, second to his distractibility. Pt also noted that he tends to overly focus on work as he always feels behind and will beat himself up over it. Pt denies every having any panic attacks. Pt denies any overt depression or acute anxiety symptoms, though thinks he might have low level constant anxiety as he constantly feels like he is on edge. Pt thinks this is alleviated most effectually by being productive at work and feeling like he is on top of his work. Pt denies that his work load is not particularly high nor is it very stressful innately, he just can't seem to focus or stay on task which makes him worry about getting in trouble but he never has hx. Pt does identify as a perfectionist and avoider, which he thinks may be a barrier to starting tasks for him, hence the procrastination. Session took longer then intended due to collecting collateral for DA.     The problem(s) began 01/01/10.        ASSESSMENT:  Mental Status Assessment:  Appearance:   Appropriate   Eye Contact:   Good   Psychomotor Behavior: Normal   Attitude:   Cooperative  Interested Friendly Pleasant  Orientation:   All  Speech   Rate / Production: Normal/ Responsive Talkative   Volume:  Normal   Mood:    Anxious  Normal  Affect:    Appropriate   Thought Content:  Clear   Thought Form:  Coherent  Logical   Insight:    Fair  and Intellectual Insight      Safety Issues and Plan for Safety and Risk Management:   [place] Suicide Severity Rating Scale (Lifetime/Recent)      8/22/2023     1:39 PM   [place] Suicide Severity Rating (Lifetime/Recent)   Q1 Wish to be Dead (Lifetime) N   Q2 Non-Specific Active Suicidal Thoughts (Lifetime) N   Actual Attempt (Lifetime) N   Has subject engaged in non-suicidal self-injurious behavior? (Lifetime) N    Interrupted Attempts (Lifetime) N   Aborted or Self-Interrupted Attempt (Lifetime) N   Preparatory Acts or Behavior (Lifetime) N   Calculated C-SSRS Risk Score (Lifetime/Recent) No Risk Indicated     Patient denies current fears or concerns for personal safety.  Patient denies current or recent suicidal ideation or behaviors.  Patient denies current or recent homicidal ideation or behaviors.  Patient denies current or recent self injurious behavior or ideation.  Patient denies other safety concerns.  Recommended that patient call 911 or go to the local ED should there be a change in any of these risk factors.  Patient reports there are no firearms in the house.     Diagnostic Criteria:  R41.841      DSM5 Diagnoses: (Sustained by DSM5 Criteria Listed Above)  Diagnoses: Other Neurodevelopmental Disorders  315.9 (F89) Unspecified Neurodevelopmental Disorder Cognitive Processing Disorder  Psychosocial & Contextual Factors: , Heterosexual, Employed Full-time, In a committed relationship  PROMIS-10 Scores        8/22/2023    12:56 PM   PROMIS-10 Total Score w/o Sub Scores   PROMIS TOTAL - SUBSCORES 30     Intervention:   Psychodynamic- Patient processed internal experiences , Completed through review of safety issues and safety interventions, and Educated on treatment planning and started identifying goals and interventions for treatment plan  Collateral Reports Completed:  Not Applicable      PLAN: (Homework, other):  1. Provider will continue Diagnostic Assessment.  Patient was given the following to do until next session:  Attend next session    2. Provider recommended the following referrals: None at this time.      3.  Suicide Risk and Safety Concerns were assessed for David Martin.    Patient meets the following risk assessment and triage: Patient denied any current/recent/lifetime history of suicidal ideation and/or behaviors.  No safety plan indicated at this time.       Yeyo Isbell, [facility]  August  22, 2023

## 2023-08-22 NOTE — PROGRESS NOTES
"[facility]      PATIENT'S NAME: David Martin  PREFERRED NAME: David  PRONOUNS:   male    MRN: [id number]  : 1995  ADDRESS: [address] [place] [address]  ACCT. NUMBER:  [id number]  DATE OF SERVICE: 23  START TIME: 10:00 am  END TIME: 10:59 am  PREFERRED PHONE: [phone]  May we leave a program related message: Yes  SERVICE MODALITY:  Video Visit:      Provider verified identity through the following two step process.  Patient provided:  Patient  and Patient address    Telemedicine Visit: The patient's condition can be safely assessed and treated via synchronous audio and visual telemedicine encounter.      Reason for Telemedicine Visit: Services only offered telehealth    Originating Site (Patient Location): Patient's home    Distant Site (Provider Location): Provider Remote Setting- Home Office    Consent:  The patient/guardian has verbally consented to: the potential risks and benefits of telemedicine (video visit) versus in person care; bill my insurance or make self-payment for services provided; and responsibility for payment of non-covered services.     Patient would like the video invitation sent by:  My Chart    Mode of Communication:  Video Conference via Karma Snap    Distant Location (Provider):  Off-site    As the provider I attest to compliance with applicable laws and regulations related to telemedicine.    UNIVERSAL ADULT Mental Health DIAGNOSTIC ASSESSMENT    Identifying Information:  Patient is a 28 year old,  individual.  Patient was referred for an assessment by [facility] clinic.  Patient attended the session alone.    Chief Complaint:   The reason for seeking services at this time is: \"Focus and anxiety issues\". Pt got tested for ADHD and was told he had Cognitive Processing Disorder instead. Pt is now seeking help with getting tools to manage this diagnosis. Pt has managed his symptoms for years prior to his testing, and he " thinks that it progressively has been impacting his work efficiency which makes him stressed out. Pt identified that procrastination is his biggest barrier, second to his distractibility. Pt also noted that he tends to overly focus on work as he always feels behind and will beat himself up over it. Pt denies every having any panic attacks. Pt denies any overt depression or acute anxiety symptoms, though thinks he might have low level constant anxiety as he constantly feels like he is on edge. Pt thinks this is alleviated most effectually by being productive at work and feeling like he is on top of his work. Pt denies that his work load is not particularly high nor is it very stressful innately, he just can't seem to focus or stay on task which makes him worry about getting in trouble but he never has hx. Pt does identify as a perfectionist and avoider, which he thinks may be a barrier to starting tasks for him, hence the procrastination.     The problem(s) began 01/01/10.    Patient has not attempted to resolve these concerns in the past.    Social/Family History:  Patient reported they grew up in other [place].  They were raised by biological parents, only child.  Parents were always together and both still alive.  Patient reported that their childhood was comfortable, loving, and mom was around more but dad was present. Patient described their current relationships with family of origin as happy and connected.     The patient describes their cultural background as .  Cultural influences and impact on patient's life structure, values, norms, and healthcare: N/A.  Contextual influences on patient's health include: none.    These factors will be addressed in the Preliminary Treatment plan. Patient identified their preferred language to be English. Patient reported they does not need the assistance of an  or other support involved in therapy.     Patient reported had no significant delays in  developmental tasks.   Patient's highest education level was college graduate  .  Patient identified the following learning problems: none reported and was actually in the gifted program .  Modifications will not be used to assist communication in therapy. Patient reports they are  able to understand written materials.    Patient reported the following relationship history of current LTR and some dating beforehand.  Patient's current relationship status is has a partner or significant other for 10 years.   Patient identified their sexual orientation as heterosexual.  Patient reported having 0 child(jaylan). Patient identified partner; parents; friends as part of their support system.  Patient identified the quality of these relationships as stable and meaningful,  .      Patient's current living/housing situation involves staying in own home/apartment.  The immediate members of family and household include Cecilia Burks, 29,Girlfriend and their dog, numlalit, and they report that housing is stable.    Patient is currently employed fulltime.  Patient reports their finances are obtained through employment. Patient does not identify finances as a current stressor.      Patient reported that they have not been involved with the legal system. Patient does not report being under probation/ parole/ jurisdiction. They are not under any current court jurisdiction. .    Patient's Strengths and Limitations:  Patient identified the following strengths or resources that will help them succeed in treatment: commitment to health and well being, community involvement, exercise routine, family support, insight, intelligence, positive work environment, sense of humor, strong social skills, and work ethic. Things that may interfere with the patient's success in treatment include: none identified.     Assessments:  The following assessments were completed by patient for this visit:  PHQ2:       8/22/2023    12:48 PM 4/28/2023     9:24 AM  1/23/2023    10:23 AM 3/3/2022     8:56 AM 1/24/2022    11:45 AM   PHQ-2 ( 1999 [employer])   Q1: Little interest or pleasure in doing things 0 0 0 0 0   Q2: Feeling down, depressed or hopeless 0 0 0 0 0   PHQ-2 Score 0 0 0 0 0   Q1: Little interest or pleasure in doing things Not at all  Not at all    Not at all Not at all Not at all   Q2: Feeling down, depressed or hopeless Not at all  Not at all    Not at all Not at all Not at all   PHQ-2 Score 0  0    0 0 0     GAD2:       8/22/2023    12:56 PM   LUAN-2   Feeling nervous, anxious, or on edge 2   Not being able to stop or control worrying 1   LUAN-2 Total Score 3     GAD7:       8/22/2023    12:56 PM   LUAN-7 SCORE   Total Score 7 (mild anxiety)   Total Score 7     CAGE-AID:       8/22/2023    12:57 PM   CAGE-AID Total Score   Total Score 1   Total Score MyChart 1 (A total score of 2 or greater is considered clinically significant)     PROMIS 10-Global Health (only subscores and total score):       8/22/2023    12:56 PM   PROMIS-10 Scores Only   Global Mental Health Score 15   Global Physical Health Score 15   PROMIS TOTAL - SUBSCORES 30     Barton Suicide Severity Rating Scale (Lifetime/Recent)      8/22/2023     1:39 PM   Barton Suicide Severity Rating (Lifetime/Recent)   Q1 Wish to be Dead (Lifetime) N   Q2 Non-Specific Active Suicidal Thoughts (Lifetime) N   Actual Attempt (Lifetime) N   Has subject engaged in non-suicidal self-injurious behavior? (Lifetime) N   Interrupted Attempts (Lifetime) N   Aborted or Self-Interrupted Attempt (Lifetime) N   Preparatory Acts or Behavior (Lifetime) N   Calculated C-SSRS Risk Score (Lifetime/Recent) No Risk Indicated       Personal and Family Medical History:  Patient does not report a family history of mental health concerns. Patient reports family history includes Aortic Valve Replacement in his father; Asthma in his father; Breast Cancer in an other family member; Breast Cancer (age of onset: 40) in his maternal aunt;  Gout in his father; Hyperlipidemia in his father; Hypertension in his father; Kidney Disease in his maternal grandmother; Thyroid Disease in his mother.    Patient does not report Mental Health Diagnosis or Treatment.      Patient has had a physical exam to rule out medical causes for current symptoms.  Date of last physical exam was within the past year. Client was encouraged to follow up with PCP if symptoms were to develop. The patient has a [place] Primary Care Provider, who is named Mariana Sherman..  Patient reports the following current medical concerns: mild hypertension .  Patient denies any issues with pain. Pt does have goute but it is managed with meds.  There are not significant appetite / nutritional concerns / weight changes.   Patient does not report a history of head injury / trauma / cognitive impairment.     Current Outpatient Medications   Medication    allopurinol (ZYLOPRIM) 100 MG tablet    allopurinol 200 MG TABS    fluticasone-salmeterol (ADVAIR DISKUS) 250-50 MCG/ACT inhaler    indomethacin (INDOCIN) 25 MG capsule    VENTOLIN  (90 Base) MCG/ACT inhaler     No current facility-administered medications for this visit.     Medication Adherence:  Patient reports taking.  taking prescribed medications as prescribed.    Patient Allergies:    Allergies   Allergen Reactions    Minocycline Rash     GIVEN FOR ACNE WHEN A TEENAGER AND BROKE OUT IN [employer]   Denies any other when asked directly    Medical History:    Past Medical History:   Diagnosis Date    Chronic idiopathic gout involving toe without tophus     dx age 22 toe swleling,    Elevated blood pressure reading without diagnosis of hypertension 1/24/2022    Hypertension, unspecified type 4/28/2023    Moderate persistent asthma without complication     dx in middle school         Current Mental Status Exam:   Appearance:  Appropriate    Eye Contact:  Good   Psychomotor:  Normal       Gait / station:  Unable to assess  Attitude /  Demeanor: Cooperative  Interested Friendly Pleasant  Speech      Rate / Production: Normal/ Responsive Talkative      Volume:  Normal  volume      Language:  intact  Mood:   Anxious  Normal  Affect:   Appropriate    Thought Content: Clear   Thought Process: Coherent  Logical       Associations: No loosening of associations  Insight:   Good  and Intellectual Insight  Judgment:  Intact   Orientation:  All  Attention/concentration: Fair    Substance Use:  Patient did report a family history of substance use concerns; see medical history section for details. Pt's parents experimented with drugs prior to pt being born. Patient has not received chemical dependency treatment in the past.  Patient has not ever been to detox.      Patient is not currently receiving any chemical dependency treatment. Patient reported the following problems as a result of their substance use:   none reported .    Patient reports using alcohol 2 times per week and has 3 beers and glasses of wine at a time. Patient first started drinking at age 17.  Patient reported date of last use was 8/19/23.  Patient reports heaviest use was in college.  Patient denies using tobacco.  Patient reports using cannabis 2 times per day and smokes 2 at a time. Patient started using cannabis at age 17.  Patient reports last use was 8/21/23.  Patient reports heaviest use was in college. Pt previously had a prescription for cannabis for his goute but did not transfer it to [place] when he moved.   Patient reports using caffeine 1 times per day and drinks 1 at a time. Patient started using caffeine at age 15.  Patient reports using/abusing the following substance(s). Patient reported no other substance use.     Substance Use: vomiting, hangovers, and daily use    Based on the negative CAGE score and clinical interview there  are not indications of drug or alcohol abuse.        8/22/2023    12:57 PM   CAGE-AID Total Score   Total Score 1   Total Score MyChart 1 (A total  score of 2 or greater is considered clinically significant)       CAGE-AID score  > 1 is a positive screen, suggesting further discussion is needed to determine if evaluation for alcohol or substance abuse is appropriate.  A score > 2 is considered clinically significant, suggesting further evaluation of alcohol or substance-related problems is indicated.      Significant Losses / Trauma / Abuse / Neglect Issues:   Patient did not serve in the .  There are indications or report of significant loss, trauma, abuse or neglect issues related to: death of girlfriend's mom by cancer and having to move to [place] because of it .  Concerns for possible neglect are not present.     Safety Assessment:   Patient denies current homicidal ideation and behaviors.  Patient denies current self-injurious ideation and behaviors.    Patient denied risk behaviors associated with substance use.  Patient denies any high risk behaviors associated with mental health symptoms.  Patient reports the following current concerns for their personal safety: None.  Patient reports there are not firearms in the house.    History of Safety Concerns:  Patient denied a history of homicidal ideation.     Patient denied a history of personal safety concerns.    Patient denied a history of assaultive behaviors.    Patient denied a history of sexual assault behaviors.     Patient denied a history of risk behaviors associated with substance use.  Patient denies any history of high risk behaviors associated with mental health symptoms.  Patient reports the following protective factors: forward or future oriented thinking; dedication to family or friends; safe and stable environment; regular sleep; effectively controls impulses; regular physical activity; sense of belonging; secure attachment; effective problem solving skills; positive social skills; healthy fear of risky behaviors or pain; financial stability    Risk Plan:  See Recommendations for Safety  and Risk Management Plan    Review of Symptoms per patient report:   Depression: No symptoms  Sona:  No Symptoms  Psychosis: No Symptoms  Anxiety: Excessive worry, Nervousness, Ruminations, Poor concentration, Irritability, and guilt/shame  Panic:  No symptoms  Post Traumatic Stress Disorder:  No Symptoms   Eating Disorder: No Symptoms  ADD / ADHD:  Inattentive, Difficulties listening, Poor task completion, Distractibility, Impulsive, Restlessness/fidgety, and time management  Conduct Disorder: No symptoms  Autism Spectrum Disorder: No symptoms  Obsessive Compulsive Disorder: Possible mild body dysmorphia    Patient reports the following compulsive behaviors and treatment history: Social Media - has not had treatment. Rule Out     Diagnostic Criteria:   Attention Deficit Hyperactivity Disorder  A) A persistent pattern of inattention and/or hyperactivity-impulsivity that interferes with functioning or development, as characterized by (1) Inattention and/or (2) Hyperactivity and Impulsivity  (1) Inattention: 6 or more of the following symptoms have persisted for at least 6 months to a degree that is inconsistent with developmental level and that negatively impacts directly on social and academic/occupational activities:  - Often fails to give close attention to details or makes careless mistakes in schoolwork, at work, or during other activities  - Often has difficulty sustaining attention in tasks or play activities  - Often has difficulty organizing tasks and activities  - Often avoids, dislikes, or is reluctant to engage in tasks that require sustained mental effort  - Is often easily distractedby extraneous stimuli  - Is often forgetful in daily activities  (2) Hyeractivity and Impulsivity: 6 or more of the following symptoms have persisted for at least 6 months to a degree that is inconsistent with developmental level and that negatively impacts directly on social and academic/occupational activities:  - Often  "fidgets with or taps hands or feet or squirms in seat  - Is often \"on the go,\" acting as if \"driven by a motor\"  - Often talks excessively  B) Several inattentive or hyperactive-impulsive symptoms were present prior to age 11 years  C) Several inattentive or hyperactive-impulsive symptoms are present in two or more settings  D) There is clear evidence that the symptoms interfere with, or reduce the quality of, social academic, or occupational functioning  E) The Symptoms do not occur exclusively during the course of schizophrenia or another psychotic disorder and are not better explained by another mental disorder    Functional Status:  Patient reports the following functional impairments:  health maintenance, management of the household and or completion of tasks, relationship(s), social interactions, work / vocational responsibilities, and time management .     Nonprogrammatic care:  Patient is requesting basic services to address current mental health concerns.    Clinical Summary:  1. Reason for assessment: establishing DA visit.  2. Psychosocial, Cultural and Contextual Factors: In Committed relationship, Heterosexual, no children, [employer] male, , employed full time.  3. 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  4. Other Diagnoses that is relevant to services:   300.00 (F41.9) Unspecified Anxiety Disorder.  5. Provisional Diagnosis: See above as evidenced by pt's presentation and self-report.  6. Prognosis: Expect Improvement, Relieve Acute Symptoms, and Maintain Current Status / Prevent Deterioration.  7. Likely consequences of symptoms if not treated: Pt expressed concern of his job being progressively impacted substantially by he MH symptoms if he doesn't learn to manage " them more effectively. Pt is also unsure how to manage his anxiety when it become acute, which can make it hard to not avoid triggers.  8. Client strengths include:  caring, creative, educated, empathetic, employed, insightful, intelligent, motivated, open to suggestions / feedback, support of family, friends and providers, willing to ask questions, and work history .     Recommendations:     1. Plan for Safety and Risk Management:   Safety and Risk: Recommended that patient call 911 or go to the local ED should there be a change in any of these risk factors..          Report to child / adult protection services was NA.     2. Patient's identified  no cultural considerations to be taken into account at this time .     3. Initial Treatment will focus on:    Anxiety - avoidance and perfectionism  Attentional Problems - Improve focus/concentration skills .     4. Resources/Service Plan:    services are not indicated.   Modifications to assist communication are not indicated.   Additional disability accommodations are not indicated.      5. Collaboration:   Collaboration / coordination of treatment will be initiated with the following  support professionals: primary care physician.      6.  Referrals:   The following referral(s) will be initiated:  none at this time . Next Scheduled Appointment: NA.      A Release of Information has been obtained for the following:  trx team is within [facility] .     Emergency Contact MELANY Brooks, was obtained. See EPIC     Clinical Substantiation/medical necessity for the above recommendations:  See above DA.    7. ERASTO:    ERASTO:  Discussed the general effects of drugs and alcohol on health and well-being and Attempt harm reduction by reducing cannabis use . Provider gave patient printed information about the  effects of chemical use on their health and well being. Recommendations:  Ongoing monitoring, but no immediate concerns.     8. Records:   These were not available for  review at time of assessment.   Information in this assessment was obtained from the medical record and  provided by patient who is a good historian.    Patient will have open access to their mental health medical record.    9.   Interactive Complexity: No    Provider Name/ Credentials:  MYNOR Mayer  August 22, 2023

## 2023-09-01 ENCOUNTER — VIRTUAL VISIT (OUTPATIENT)
Dept: PSYCHOLOGY | Facility: CLINIC | Age: 28
End: 2023-09-01
Payer: COMMERCIAL

## 2023-09-01 DIAGNOSIS — R41.840 ATTENTION AND CONCENTRATION DEFICIT: Primary | ICD-10-CM

## 2023-09-01 DIAGNOSIS — F12.20 CANNABIS DEPENDENCE (H): ICD-10-CM

## 2023-09-01 PROCEDURE — 90791 PSYCH DIAGNOSTIC EVALUATION: CPT | Mod: 95 | Performed by: COUNSELOR

## 2023-09-22 ENCOUNTER — VIRTUAL VISIT (OUTPATIENT)
Dept: PSYCHOLOGY | Facility: CLINIC | Age: 28
End: 2023-09-22
Payer: COMMERCIAL

## 2023-09-22 DIAGNOSIS — R41.840 ATTENTION AND CONCENTRATION DEFICIT: ICD-10-CM

## 2023-09-22 DIAGNOSIS — F90.8 ATTENTION DEFICIT HYPERACTIVITY DISORDER (ADHD), OTHER TYPE: Primary | ICD-10-CM

## 2023-09-22 PROCEDURE — 90834 PSYTX W PT 45 MINUTES: CPT | Mod: VID | Performed by: COUNSELOR

## 2023-09-22 NOTE — PROGRESS NOTES
[facility]                                     Progress Note    Patient Name: David Martin  Date: 9/22/23         Service Type: Late Cancel / No Show      Session Start Time: 2:00pm  Session End Time: 2:48 pm     Session Length: 48 minutes    Session #: 2    Attendees: Client attended alone    Service Modality:  Video Visit:      Provider verified identity through the following two step process.  Patient provided:  Patient is known previously to provider    Telemedicine Visit: The patient's condition can be safely assessed and treated via synchronous audio and visual telemedicine encounter.      Reason for Telemedicine Visit: Services only offered telehealth    Originating Site (Patient Location): Patient's home    Distant Site (Provider Location): Provider Remote Setting- Home Office    Consent:  The patient/guardian has verbally consented to: the potential risks and benefits of telemedicine (video visit) versus in person care; bill my insurance or make self-payment for services provided; and responsibility for payment of non-covered services.     Patient would like the video invitation sent by:  My Chart    Mode of Communication:  Video Conference via [employer]    Distant Location (Provider):  Off-site    As the provider I attest to compliance with applicable laws and regulations related to telemedicine.    DATA  Interactive Complexity: No  Crisis: No        Progress Since Last Session (Related to Symptoms / Goals / Homework):   Symptoms: Improving mood still find concentration to be difficult    Homework:  none assigned, establishing trx plan visit      Episode of Care Goals: Achieved / completed to satisfaction - PREPARATION (Decided to change - considering how); Intervened by negotiating a change plan and determining options / strategies for behavior change, identifying triggers, exploring social supports, and working towards setting a date to begin behavior change     Current / Ongoing  Stressors and Concerns:   Pt is currently seeking therapy due to ongoing ADHD-like symptoms (related to processing delay), and procrastination at work which results in anxiety around deadlines. Pt expressed that his mood continues to be good and his ability to focus is still challenging. Pt has found that leaving his phone in another room when working from home has been helpful in reducing his chances of being distracted. Pt thinks that he has been doing better with staying on top of things and getting all his work done by the end of the day. Pt verbalized that he still struggles with perfectionism that results in him avoiding, and found that when he isn't the  on a project it is better. Conversely the project where he is the , he finds himself feeling a lot more pressure which makes his desire to procrastinate or avoid much stronger. Psycho-ed on the anxiety-avoidance cycle was provided, which resonated with pt, and he found provided insight into how he can approach things differently in the future. Pt verbalized that he has been feeling more motivated now that he is therapy to work on these things. Therapist (writer) and pt co-developed the trx plan today.     Treatment Objective(s) Addressed in This Session:   identify and use 3 strategies to improve organization  identify thoughts, triggers, and  stressors which contribute to feelings of anxiety  use cognitive strategies identified in therapy to challenge anxious thoughts  Identify negative self-talk and behaviors: challenge core beliefs, myths, and actions  Improve concentration, focus, and mindfulness in daily activities   identify two areas of life that you would like to have improved functioning  identify at least 3 adaptive coping statements to counteract negative thoughts  Co-developed trx plan     Intervention:   CBT: reviewed how environmental/situational factors impact his MH    Motivational Interviewing    MI Intervention:  Co-Developed Goal: trx plan, Expressed Empathy/Understanding, Supported Autonomy, Collaboration, Evocation, Open-ended questions, Reflections: simple and complex, Change talk (evoked), and Reframe     Change Talk Expressed by the Patient: Desire to change Ability to change Reasons to change Need to change Committment to change    Provider Response to Change Talk: E - Evoked more info from patient about behavior change, A - Affirmed patient's thoughts, decisions, or attempts at behavior change, R - Reflected patient's change talk, and S - Summarized patient's change talk statements    Psychodynamic: Processed through internal experiences related to his current symptoms and how things used to be with MH  Solution Focused: Identified immediate areas of concern to address and discussed potential barriers to success    Assessments completed prior to visit:  PHQ2:       8/22/2023    12:48 PM 4/28/2023     9:24 AM 1/23/2023    10:23 AM 3/3/2022     8:56 AM 1/24/2022    11:45 AM   PHQ-2 ( 1999 [employer])   Q1: Little interest or pleasure in doing things 0 0 0 0 0   Q2: Feeling down, depressed or hopeless 0 0 0 0 0   PHQ-2 Score 0 0 0 0 0   Q1: Little interest or pleasure in doing things Not at all  Not at all    Not at all Not at all Not at all   Q2: Feeling down, depressed or hopeless Not at all  Not at all    Not at all Not at all Not at all   PHQ-2 Score 0  0    0 0 0     GAD2:       8/22/2023    12:56 PM   LUAN-2   Feeling nervous, anxious, or on edge 2   Not being able to stop or control worrying 1   LUAN-2 Total Score 3     GAD7:       8/22/2023    12:56 PM   LUAN-7 SCORE   Total Score 7 (mild anxiety)   Total Score 7     Spray Suicide Severity Rating Scale (Lifetime/Recent)      8/22/2023     1:39 PM   Spray Suicide Severity Rating (Lifetime/Recent)   Q1 Wish to be Dead (Lifetime) N   Q2 Non-Specific Active Suicidal Thoughts (Lifetime) N   Actual Attempt (Lifetime) N   Has subject engaged in non-suicidal  self-injurious behavior? (Lifetime) N   Interrupted Attempts (Lifetime) N   Aborted or Self-Interrupted Attempt (Lifetime) N   Preparatory Acts or Behavior (Lifetime) N   Calculated C-SSRS Risk Score (Lifetime/Recent) No Risk Indicated         ASSESSMENT: Current Emotional / Mental Status (status of significant symptoms):   Risk status (Self / Other harm or suicidal ideation)   Patient denies current fears or concerns for personal safety.   Patient denies current or recent suicidal ideation or behaviors.   Patient denies current or recent homicidal ideation or behaviors.   Patient denies current or recent self injurious behavior or ideation.   Patient denies other safety concerns.   Patient reports there has been no change in risk factors since their last session.     Patient reports there has been no change in protective factors since their last session.     Recommended that patient call 911 or go to the local ED should there be a change in any of these risk factors.     Appearance:   Appropriate    Eye Contact:   Good    Psychomotor Behavior: Normal    Attitude:   Cooperative  Interested Friendly Pleasant   Orientation:   All   Speech    Rate / Production: Normal     Volume:  Normal    Mood:    Anxious  Normal   Affect:    Appropriate    Thought Content:  Clear    Thought Form:  Coherent  Logical    Insight:    Good      Medication Review:   No current psychiatric medications prescribed     Medication Compliance:   NA     Changes in Health Issues:   None reported     Chemical Use Review:   Substance Use: Chemical use reviewed, no active concerns identified      Tobacco Use: No current tobacco use.      Diagnosis:  1. Attention deficit hyperactivity disorder (ADHD), other type    2. Attention and concentration deficit        Collateral Reports Completed:   Not Applicable    PLAN: (Patient Tasks / Therapist Tasks / Other)  Lean into discomfort and not avoid  Continue to leave phone in another room when at home  Try  leaving phone in bag at work to help with focus  Connect with doctor about possible meds  Follow-up on volunteering    Yeyo Isbell, [facility]                                                         ______________________________________________________________________    Individual Treatment Plan    Patient's Name: David Martin  YOB: 1995    Date of Creation: 9/22/23  Date Treatment Plan Last Reviewed/Revised: 9/22/23    DSM5 Diagnoses: Attention-Deficit/Hyperactivity Disorder  314.01 (F90.9) Unspecified Attention -Deficit / Hyperactivity Disorder or 300.02 (F41.1) Generalized Anxiety Disorder  Psychosocial / Contextual Factors: Heterosexual, CIS male, , father of several young children, employed full time, hx of depression, and marital stressors  PROMIS (reviewed every 90 days):   PROMIS-10 Scores        8/22/2023    12:56 PM   PROMIS-10 Total Score w/o Sub Scores   PROMIS TOTAL - SUBSCORES 30       Referral / Collaboration:  Referral to another professional/service is not indicated at this time..    Anticipated number of session for this episode of care: 6-9 sessions  Anticipation frequency of session: Biweekly  Anticipated Duration of each session: 38-52 minutes  Treatment plan will be reviewed in 90 days or when goals have been changed.     Measurable Treatment Goal(s) related to diagnosis / functional impairment(s)  Goal 1: Patient will improve his ability to focus/concentrate and be better at not procrastinating to reduce his anxiety around deadlines.    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      Objective #A (Patient Action)    Patient will identify and use 3 strategies to improve organization  identify thoughts, triggers and  stressors which contribute to feelings of anxiety  use cognitive strategies identified in therapy to challenge anxious thoughts  learn at least 3 self-regulation  strategies.  Status: New - Date: 9/22/23      Intervention(s)  Therapist will assign homework related to target objective with the intent to promote pt autonomy and better manage MH.  Facilitate increase in self-awareness  Provide psycho-education on organization and routine building  Teach/promote utilization of habit forming skills and coping skills    Objective #B Reduce avoidance and perfectionism  Patient will identify 3 strategies to more effectively address stressors  identify patterns of escalation  (i.e. tightness in chest, flushed face, increased heart rate, clenched hands, etc.)  identify at least 3 techniques for intervening on the escalation  Identify negative self-talk and behaviors: challenge core beliefs, myths, and actions  Improve concentration, focus, and mindfulness in daily activities   identify two areas of life that you would like to have improved functioning  identify at least 3 adaptive coping statements to counteract negative thoughts.  Status: New - Date: 9/22/23      Intervention(s)  Therapist will assign homework related to target objective with the intent to promote pt autonomy and better manage MH.  Facilitate increase in self-awareness  Provide psycho-education on anxiety-avoidance cycle  Teach/promote utilization of behavior modification    Goal 2: Patient will get more involved in his community and foster feelings of authenticity by living his values.    I will know I've met my goal when I feel like I am putting my money where my mouth is.      Objective #A (Patient Action)    Status: New - Date: 9/22/23      Patient will identify and compliment positives at least 3 times daily to support positive self-image  participate in community activities to improve mood  attend and participate in social or recreational activities consistently once per week  practice one mindfulness skill each day for 5-10 minutes  use positive self-affirmations daily  identify at least 3 self-care  activities.    Intervention(s)  Therapist will assign homework related to target objective with the intent to promote pt autonomy and better manage MH.  Facilitate increase in self-awareness  Provide psycho-education on positive self-talk and internal motivation development  Teach/promote utilization of narrative therapy techniques     Patient has reviewed and agreed to the above plan.      Yeyo Isbell, [facility]  September 22, 2023

## 2023-10-06 ENCOUNTER — DOCUMENTATION ONLY (OUTPATIENT)
Dept: PSYCHOLOGY | Facility: CLINIC | Age: 28
End: 2023-10-06

## 2023-10-06 NOTE — PROGRESS NOTES
Therapist (writer) entered the virtual session a the scheduled time of the appointment. Pt did not arrive on time and therapist called pt about 10 minutes into the appointment, leaving a VM prompting them to join via [employer] or call the office or reach out through [employer] if they were having an issue. Therapist waited an addition 10-15 minutes for pt to join and when they did not the session was cancelled. Therapist did provide details of their next follow-up in the VM was well and encouraged pt to attend.

## 2023-10-20 ENCOUNTER — VIRTUAL VISIT (OUTPATIENT)
Dept: PSYCHOLOGY | Facility: CLINIC | Age: 28
End: 2023-10-20

## 2023-10-20 DIAGNOSIS — F41.1 GENERALIZED ANXIETY DISORDER: ICD-10-CM

## 2023-10-20 DIAGNOSIS — R41.840 ATTENTION AND CONCENTRATION DEFICIT: Primary | ICD-10-CM

## 2023-10-20 PROCEDURE — 90834 PSYTX W PT 45 MINUTES: CPT | Mod: VID | Performed by: COUNSELOR

## 2023-10-20 NOTE — PROGRESS NOTES
[facility]                                     Progress Note    Patient Name: David Martin  Date: 10/20/23         Service Type: Individual      Session Start Time: 1:00 pm  Session End Time: 1:45 pm     Session Length: 45 minutes    Session #: 3    Attendees: Client attended alone    Service Modality:  Video Visit:      Provider verified identity through the following two step process.  Patient provided:  Patient is known previously to provider    Telemedicine Visit: The patient's condition can be safely assessed and treated via synchronous audio and visual telemedicine encounter.      Reason for Telemedicine Visit: Services only offered telehealth    Originating Site (Patient Location): Patient's home    Distant Site (Provider Location): Provider Remote Setting- Home Office    Consent:  The patient/guardian has verbally consented to: the potential risks and benefits of telemedicine (video visit) versus in person care; bill my insurance or make self-payment for services provided; and responsibility for payment of non-covered services.     Patient would like the video invitation sent by:  My Chart    Mode of Communication:  Video Conference via [employer]    Distant Location (Provider):  Off-site    As the provider I attest to compliance with applicable laws and regulations related to telemedicine.    DATA  Interactive Complexity: No  Crisis: No        Progress Since Last Session (Related to Symptoms / Goals / Homework):   Symptoms: Improving symptoms, pt is feeling less anxious and stressed    Homework: Partially completed      Episode of Care Goals: Satisfactory progress - ACTION (Actively working towards change); Intervened by reinforcing change plan / affirming steps taken     Current / Ongoing Stressors and Concerns:   Pt is currently seeking therapy due to ongoing ADHD-like symptoms (related to processing delay), and procrastination at work which results in anxiety around deadlines. Since  "last session, pt reported that things have continued to be pretty consistent in his mood and attention. Pt has been enjoying the fall so far and has been getting outside to help him stay active, trout fishing being a big aspect of this. Pt recently had some quarterly/annual transition stuff at work, which he has been managing well, and involve a lot of meetings which gives him less space or opportunities to be distracted. Pt has also noticed that his motivation waned a bit after missing out last session, which reinforces to him that therapy provides him ongoing motivation and recognize that he has actual goals he is working towards. Pt has been exploring volunteer opportunities, including trying to get more involved in groups he is technically part of but never actually engaged with. Pt noted specifically wanting to get more involved around fishing and conservation. Pt reflected that since starting to engage in exploring these opportunities, he has found his anxiety and feeling less guilt when he may have a \"bad day\" of productivity when off the clock is much less since he has something to look forward to outside of work. Both pt and his partner have also noticed that since starting therapy he is handling his stress better in general which makes him less irritable after work. Pt processed through how he has been working on using some of the exposure or anti-avoidance tactics that were discussed previously. Pt found that not avoiding has been helpful in reducing his anxiety and has reduced his overall avoidance behavior at work. Pt expressed finding himself focusing on past negative experiences over positive ones, even though he rationally know that he has more positive then negative ones. Psycho-ed on cognitive distortions was provided and pt felt they made sense.      Treatment Objective(s) Addressed in This Session:   identify and use 3 strategies to improve organization  identify thoughts, triggers, and  " stressors which contribute to feelings of anxiety  use cognitive strategies identified in therapy to challenge anxious thoughts  Identify negative self-talk and behaviors: challenge core beliefs, myths, and actions  Improve concentration, focus, and mindfulness in daily activities   identify two areas of life that you would like to have improved functioning  identify at least 3 adaptive coping statements to counteract negative thoughts     Intervention:   CBT: reviewed how environmental/situational factors impact his MH    Motivational Interviewing    MI Intervention: Expressed Empathy/Understanding, Supported Autonomy, Collaboration, Evocation, Open-ended questions, Reflections: simple and complex, Change talk (evoked), and Reframe     Change Talk Expressed by the Patient: Desire to change Ability to change Reasons to change Need to change Committment to change Activation Taking steps    Provider Response to Change Talk: E - Evoked more info from patient about behavior change, A - Affirmed patient's thoughts, decisions, or attempts at behavior change, R - Reflected patient's change talk, and S - Summarized patient's change talk statements    Psychodynamic: Processed through internal experiences related to his current symptoms and how things used to be with MH  Solution Focused: Identified immediate areas of concern to address and discussed potential barriers to success    Assessments completed prior to visit:  PHQ2:       8/22/2023    12:48 PM 4/28/2023     9:24 AM 1/23/2023    10:23 AM 3/3/2022     8:56 AM 1/24/2022    11:45 AM   PHQ-2 ( [employer])   Q1: Little interest or pleasure in doing things 0 0 0 0 0   Q2: Feeling down, depressed or hopeless 0 0 0 0 0   PHQ-2 Score 0 0 0 0 0   Q1: Little interest or pleasure in doing things Not at all  Not at all    Not at all Not at all Not at all   Q2: Feeling down, depressed or hopeless Not at all  Not at all    Not at all Not at all Not at all   PHQ-2 Score 0  0    0  0 0     GAD2:       8/22/2023    12:56 PM   LUAN-2   Feeling nervous, anxious, or on edge 2   Not being able to stop or control worrying 1   LUAN-2 Total Score 3     GAD7:       8/22/2023    12:56 PM   LUAN-7 SCORE   Total Score 7 (mild anxiety)   Total Score 7     [place] Suicide Severity Rating Scale (Lifetime/Recent)      8/22/2023     1:39 PM   [place] Suicide Severity Rating (Lifetime/Recent)   Q1 Wish to be Dead (Lifetime) N   Q2 Non-Specific Active Suicidal Thoughts (Lifetime) N   Actual Attempt (Lifetime) N   Has subject engaged in non-suicidal self-injurious behavior? (Lifetime) N   Interrupted Attempts (Lifetime) N   Aborted or Self-Interrupted Attempt (Lifetime) N   Preparatory Acts or Behavior (Lifetime) N   Calculated C-SSRS Risk Score (Lifetime/Recent) No Risk Indicated         ASSESSMENT: Current Emotional / Mental Status (status of significant symptoms):   Risk status (Self / Other harm or suicidal ideation)   Patient denies current fears or concerns for personal safety.   Patient denies current or recent suicidal ideation or behaviors.   Patient denies current or recent homicidal ideation or behaviors.   Patient denies current or recent self injurious behavior or ideation.   Patient denies other safety concerns.   Patient reports there has been no change in risk factors since their last session.     Patient reports there has been no change in protective factors since their last session.     Recommended that patient call 911 or go to the local ED should there be a change in any of these risk factors.     Appearance:   Appropriate    Eye Contact:   Good    Psychomotor Behavior: Normal    Attitude:   Cooperative  Interested Friendly Pleasant   Orientation:   All   Speech    Rate / Production: Normal     Volume:  Normal    Mood:    Anxious  Normal   Affect:    Appropriate    Thought Content:  Clear    Thought Form:  Coherent  Logical    Insight:    Good      Medication Review:   No current psychiatric  medications prescribed     Medication Compliance:   NA     Changes in Health Issues:   None reported     Chemical Use Review:   Substance Use: Chemical use reviewed, no active concerns identified      Tobacco Use: No current tobacco use.      Diagnosis:  1. Attention and concentration deficit    2. Generalized anxiety disorder          Collateral Reports Completed:   Not Applicable    PLAN: (Patient Tasks / Therapist Tasks / Other)  Lean into discomfort and not avoid  Try leaving phone in bag at work to help with focus  Follow-up on volunteer opportunities   Attend next therapy session  Review Handout    MYNOR Mayer                                                         ______________________________________________________________________    Individual Treatment Plan    Patient's Name: David Martin  YOB: 1995    Date of Creation: 9/22/23  Date Treatment Plan Last Reviewed/Revised: 9/22/23    DSM5 Diagnoses: Attention-Deficit/Hyperactivity Disorder  314.01 (F90.9) Unspecified Attention -Deficit / Hyperactivity Disorder or 300.02 (F41.1) Generalized Anxiety Disorder  Psychosocial / Contextual Factors: Heterosexual, CIS male, , father of several young children, employed full time, hx of depression, and marital stressors  PROMIS (reviewed every 90 days):   PROMIS-10 Scores        8/22/2023    12:56 PM   PROMIS-10 Total Score w/o Sub Scores   PROMIS TOTAL - SUBSCORES 30       Referral / Collaboration:  Referral to another professional/service is not indicated at this time..    Anticipated number of session for this episode of care: 6-9 sessions  Anticipation frequency of session: Biweekly  Anticipated Duration of each session: 38-52 minutes  Treatment plan will be reviewed in 90 days or when goals have been changed.     Measurable Treatment Goal(s) related to diagnosis / functional impairment(s)  Goal 1: Patient will improve his ability to focus/concentrate and be better at not  procrastinating to reduce his anxiety around deadlines.    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      Objective #A (Patient Action)    Patient will identify and use 3 strategies to improve organization  identify thoughts, triggers and  stressors which contribute to feelings of anxiety  use cognitive strategies identified in therapy to challenge anxious thoughts  learn at least 3 self-regulation strategies.  Status: New - Date: 9/22/23      Intervention(s)  Therapist will assign homework related to target objective with the intent to promote pt autonomy and better manage MH.  Facilitate increase in self-awareness  Provide psycho-education on organization and routine building  Teach/promote utilization of habit forming skills and coping skills    Objective #B Reduce avoidance and perfectionism  Patient will identify 3 strategies to more effectively address stressors  identify patterns of escalation  (i.e. tightness in chest, flushed face, increased heart rate, clenched hands, etc.)  identify at least 3 techniques for intervening on the escalation  Identify negative self-talk and behaviors: challenge core beliefs, myths, and actions  Improve concentration, focus, and mindfulness in daily activities   identify two areas of life that you would like to have improved functioning  identify at least 3 adaptive coping statements to counteract negative thoughts.  Status: New - Date: 9/22/23      Intervention(s)  Therapist will assign homework related to target objective with the intent to promote pt autonomy and better manage MH.  Facilitate increase in self-awareness  Provide psycho-education on anxiety-avoidance cycle  Teach/promote utilization of behavior modification    Goal 2: Patient will get more involved in his community and foster feelings of authenticity by living his values.    I will know I've met my goal when I feel  like I am putting my money where my mouth is.      Objective #A (Patient Action)    Status: New - Date: 9/22/23      Patient will identify and compliment positives at least 3 times daily to support positive self-image  participate in community activities to improve mood  attend and participate in social or recreational activities consistently once per week  practice one mindfulness skill each day for 5-10 minutes  use positive self-affirmations daily  identify at least 3 self-care activities.    Intervention(s)  Therapist will assign homework related to target objective with the intent to promote pt autonomy and better manage MH.  Facilitate increase in self-awareness  Provide psycho-education on positive self-talk and internal motivation development  Teach/promote utilization of narrative therapy techniques     Patient has reviewed and agreed to the above plan.      MYNOR Mayer  September 22, 2023

## 2023-11-17 ENCOUNTER — VIRTUAL VISIT (OUTPATIENT)
Dept: PSYCHOLOGY | Facility: CLINIC | Age: 28
End: 2023-11-17
Payer: COMMERCIAL

## 2023-11-17 DIAGNOSIS — F41.1 GENERALIZED ANXIETY DISORDER: ICD-10-CM

## 2023-11-17 DIAGNOSIS — F90.8 ATTENTION DEFICIT HYPERACTIVITY DISORDER (ADHD), OTHER TYPE: Primary | ICD-10-CM

## 2023-11-17 PROCEDURE — 90834 PSYTX W PT 45 MINUTES: CPT | Mod: VID | Performed by: COUNSELOR

## 2023-11-17 NOTE — PROGRESS NOTES
[facility]                                     Progress Note    Patient Name: David Martin  Date: 11/17/23         Service Type: Individual      Session Start Time: 9:10 am  Session End Time: 9:59 pm     Session Length: 49 minutes    Session #: 4    Attendees: Client attended alone    Service Modality:  Video Visit:      Provider verified identity through the following two step process.  Patient provided:  Patient is known previously to provider    Telemedicine Visit: The patient's condition can be safely assessed and treated via synchronous audio and visual telemedicine encounter.      Reason for Telemedicine Visit: Services only offered telehealth    Originating Site (Patient Location): Patient's home    Distant Site (Provider Location): Provider Remote Setting- Home Office    Consent:  The patient/guardian has verbally consented to: the potential risks and benefits of telemedicine (video visit) versus in person care; bill my insurance or make self-payment for services provided; and responsibility for payment of non-covered services.     Patient would like the video invitation sent by:  My Chart    Mode of Communication:  [facility]    Distant Location (Provider):  Off-site    As the provider I attest to compliance with applicable laws and regulations related to telemedicine.    DATA  Interactive Complexity: No  Crisis: No        Progress Since Last Session (Related to Symptoms / Goals / Homework):   Symptoms: Improving symptoms, pt's is feeling in a better place    Homework: Achieved / completed to satisfaction      Episode of Care Goals: Satisfactory progress - ACTION (Actively working towards change); Intervened by reinforcing change plan / affirming steps taken     Current / Ongoing Stressors and Concerns:   Pt is currently seeking therapy due to ongoing ADHD-like symptoms (related to processing delay), and procrastination at work which results in anxiety around  "deadlines. Since last session, pt reported that he has continued to deal with his work stress better and has noticed that he is doing better with leaving work at work. Work in general has been more interesting as he has been having to work on new platforms which is novel and engaging. Pt has been implementing changes with his phone at work and been using a \"focus music play list\" to help him focus at work, which has been really helpful. Pt has also been working on his mindset at work to practice delayed gratification, which he thinks has been helpful in building self-discipline up. Pt reflected on what has and hasn't been helpful using these tools. Pt verbalized feeling more confident in his work space and worrying less about his productivity. Pt processed through his challenges with self-discipline, reflecting feeling like he had more in college and practiced delayed gratification a lot more back then. Post graduating pt felt burned out on this and fell into a pattern of immediate gratification which he is now trying to reverse. Pt thinks that finding creative outlets are another helpful thing he has been using to help improve his mood. Pt has been trying to find ways to be more active in the winter, as [place] has fewer winter activities that he enjoys doing outside. Pt reviewed how his concentration and distractibility have been recently. Overall pt thinks it has been better, though is still trying to navigate what is getting in his way of being where he wants to be with it. Psycho-ed on the difference between concentration, being an internal stimuli, and distractibility, being external stimuli, was provided, which pt felt was really helpful in giving him a way to consider his own experience. Upon discussion pt thinks that his concentration may be the bigger more persistent issues as he finds that his mind wanders uncontrollably at times.       Treatment Objective(s) Addressed in This Session:   identify and use 3 " strategies to improve organization  identify thoughts, triggers, and  stressors which contribute to feelings of anxiety  use cognitive strategies identified in therapy to challenge anxious thoughts  Identify negative self-talk and behaviors: challenge core beliefs, myths, and actions  Improve concentration, focus, and mindfulness in daily activities   identify two areas of life that you would like to have improved functioning  identify at least 3 adaptive coping statements to counteract negative thoughts     Intervention:   CBT: reviewed how environmental/situational factors impact his MH    Motivational Interviewing    MI Intervention: Expressed Empathy/Understanding, Supported Autonomy, Collaboration, Evocation, Open-ended questions, Reflections: simple and complex, Change talk (evoked), and Reframe     Change Talk Expressed by the Patient: Desire to change Ability to change Reasons to change Need to change Committment to change Activation Taking steps    Provider Response to Change Talk: E - Evoked more info from patient about behavior change, A - Affirmed patient's thoughts, decisions, or attempts at behavior change, R - Reflected patient's change talk, and S - Summarized patient's change talk statements    Psychodynamic: Processed through internal experiences related to his current symptoms and how things used to be with MH  Solution Focused: Identified immediate areas of concern to address and discussed potential barriers to success    Assessments completed prior to visit:  PHQ2:       8/22/2023    12:48 PM 4/28/2023     9:24 AM 1/23/2023    10:23 AM 3/3/2022     8:56 AM 1/24/2022    11:45 AM   PHQ-2 ( 1999 [employer])   Q1: Little interest or pleasure in doing things 0 0 0 0 0   Q2: Feeling down, depressed or hopeless 0 0 0 0 0   PHQ-2 Score 0 0 0 0 0   Q1: Little interest or pleasure in doing things Not at all  Not at all    Not at all Not at all Not at all   Q2: Feeling down, depressed or hopeless Not at all   Not at all    Not at all Not at all Not at all   PHQ-2 Score 0  0    0 0 0     GAD2:       8/22/2023    12:56 PM   LUAN-2   Feeling nervous, anxious, or on edge 2   Not being able to stop or control worrying 1   LUAN-2 Total Score 3     GAD7:       8/22/2023    12:56 PM   LUAN-7 SCORE   Total Score 7 (mild anxiety)   Total Score 7     Plain Suicide Severity Rating Scale (Lifetime/Recent)      8/22/2023     1:39 PM   Plain Suicide Severity Rating (Lifetime/Recent)   Q1 Wish to be Dead (Lifetime) N   Q2 Non-Specific Active Suicidal Thoughts (Lifetime) N   Actual Attempt (Lifetime) N   Has subject engaged in non-suicidal self-injurious behavior? (Lifetime) N   Interrupted Attempts (Lifetime) N   Aborted or Self-Interrupted Attempt (Lifetime) N   Preparatory Acts or Behavior (Lifetime) N   Calculated C-SSRS Risk Score (Lifetime/Recent) No Risk Indicated         ASSESSMENT: Current Emotional / Mental Status (status of significant symptoms):   Risk status (Self / Other harm or suicidal ideation)   Patient denies current fears or concerns for personal safety.   Patient denies current or recent suicidal ideation or behaviors.   Patient denies current or recent homicidal ideation or behaviors.   Patient denies current or recent self injurious behavior or ideation.   Patient denies other safety concerns.   Patient reports there has been no change in risk factors since their last session.     Patient reports there has been no change in protective factors since their last session.     Recommended that patient call 911 or go to the local ED should there be a change in any of these risk factors.     Appearance:   Appropriate    Eye Contact:   Good    Psychomotor Behavior: Normal    Attitude:   Cooperative  Interested Friendly Pleasant   Orientation:   All   Speech    Rate / Production: Normal/ Responsive    Volume:  Normal    Mood:    Anxious  Normal   Affect:    Appropriate    Thought Content:  Clear    Thought  Form:  Coherent  Logical    Insight:    Good  and Intellectual Insight     Medication Review:   No current psychiatric medications prescribed     Medication Compliance:   NA     Changes in Health Issues:   None reported     Chemical Use Review:   Substance Use: Chemical use reviewed, no active concerns identified      Tobacco Use: No current tobacco use.      Diagnosis:  1. Attention deficit hyperactivity disorder (ADHD), other type    2. Generalized anxiety disorder        Collateral Reports Completed:   Not Applicable    PLAN: (Patient Tasks / Therapist Tasks / Other)  Follow-up on volunteer opportunities (extra credit)  Review Handout  Talk to doctor about ADHD meds  Reflect on distractibility vs concentration   Try doing some fly fishing knots    Yeyo Isbell LPC                                                         ______________________________________________________________________    Individual Treatment Plan    Patient's Name: David Martin  YOB: 1995    Date of Creation: 9/22/23  Date Treatment Plan Last Reviewed/Revised: 9/22/23    DSM5 Diagnoses: Attention-Deficit/Hyperactivity Disorder  314.01 (F90.9) Unspecified Attention -Deficit / Hyperactivity Disorder or 300.02 (F41.1) Generalized Anxiety Disorder  Psychosocial / Contextual Factors: Heterosexual, CIS male, , father of several young children, employed full time, hx of depression, and marital stressors  PROMIS (reviewed every 90 days):   PROMIS-10 Scores        8/22/2023    12:56 PM   PROMIS-10 Total Score w/o Sub Scores   PROMIS TOTAL - SUBSCORES 30       Referral / Collaboration:  Referral to another professional/service is not indicated at this time..    Anticipated number of session for this episode of care: 6-9 sessions  Anticipation frequency of session: Biweekly  Anticipated Duration of each session: 38-52 minutes  Treatment plan will be reviewed in 90 days or when goals have been changed.     Measurable  Treatment Goal(s) related to diagnosis / functional impairment(s)  Goal 1: Patient will improve his ability to focus/concentrate and be better at not procrastinating to reduce his anxiety around deadlines.    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      Objective #A (Patient Action)    Patient will identify and use 3 strategies to improve organization  identify thoughts, triggers and  stressors which contribute to feelings of anxiety  use cognitive strategies identified in therapy to challenge anxious thoughts  learn at least 3 self-regulation strategies.  Status: New - Date: 9/22/23      Intervention(s)  Therapist will assign homework related to target objective with the intent to promote pt autonomy and better manage MH.  Facilitate increase in self-awareness  Provide psycho-education on organization and routine building  Teach/promote utilization of habit forming skills and coping skills    Objective #B Reduce avoidance and perfectionism  Patient will identify 3 strategies to more effectively address stressors  identify patterns of escalation  (i.e. tightness in chest, flushed face, increased heart rate, clenched hands, etc.)  identify at least 3 techniques for intervening on the escalation  Identify negative self-talk and behaviors: challenge core beliefs, myths, and actions  Improve concentration, focus, and mindfulness in daily activities   identify two areas of life that you would like to have improved functioning  identify at least 3 adaptive coping statements to counteract negative thoughts.  Status: New - Date: 9/22/23      Intervention(s)  Therapist will assign homework related to target objective with the intent to promote pt autonomy and better manage MH.  Facilitate increase in self-awareness  Provide psycho-education on anxiety-avoidance cycle  Teach/promote utilization of behavior modification    Goal 2:  Patient will get more involved in his community and foster feelings of authenticity by living his values.    I will know I've met my goal when I feel like I am putting my money where my mouth is.      Objective #A (Patient Action)    Status: New - Date: 9/22/23      Patient will identify and compliment positives at least 3 times daily to support positive self-image  participate in community activities to improve mood  attend and participate in social or recreational activities consistently once per week  practice one mindfulness skill each day for 5-10 minutes  use positive self-affirmations daily  identify at least 3 self-care activities.    Intervention(s)  Therapist will assign homework related to target objective with the intent to promote pt autonomy and better manage MH.  Facilitate increase in self-awareness  Provide psycho-education on positive self-talk and internal motivation development  Teach/promote utilization of narrative therapy techniques     Patient has reviewed and agreed to the above plan.      Yeyo Isbell, [facility]  September 22, 2023

## 2023-11-19 ASSESSMENT — ASTHMA QUESTIONNAIRES: ACT_TOTALSCORE: 24

## 2023-11-20 ENCOUNTER — OFFICE VISIT (OUTPATIENT)
Dept: FAMILY MEDICINE | Facility: CLINIC | Age: 28
End: 2023-11-20
Payer: COMMERCIAL

## 2023-11-20 VITALS
RESPIRATION RATE: 16 BRPM | TEMPERATURE: 97.4 F | HEART RATE: 55 BPM | HEIGHT: 74 IN | OXYGEN SATURATION: 99 % | DIASTOLIC BLOOD PRESSURE: 78 MMHG | WEIGHT: 275 LBS | BODY MASS INDEX: 35.29 KG/M2 | SYSTOLIC BLOOD PRESSURE: 138 MMHG

## 2023-11-20 DIAGNOSIS — I10 HYPERTENSION, UNSPECIFIED TYPE: ICD-10-CM

## 2023-11-20 DIAGNOSIS — Z80.8 FAMILY HISTORY OF SKIN CANCER: ICD-10-CM

## 2023-11-20 DIAGNOSIS — Z23 NEED FOR PROPHYLACTIC VACCINATION AND INOCULATION AGAINST INFLUENZA: ICD-10-CM

## 2023-11-20 DIAGNOSIS — E66.01 CLASS 2 SEVERE OBESITY DUE TO EXCESS CALORIES WITH SERIOUS COMORBIDITY AND BODY MASS INDEX (BMI) OF 35.0 TO 35.9 IN ADULT (H): ICD-10-CM

## 2023-11-20 DIAGNOSIS — Z23 NEED FOR HEPATITIS B VACCINATION: ICD-10-CM

## 2023-11-20 DIAGNOSIS — B35.4 TINEA CORPORIS: ICD-10-CM

## 2023-11-20 DIAGNOSIS — Z23 NEED FOR COVID-19 VACCINE: ICD-10-CM

## 2023-11-20 DIAGNOSIS — R41.840 DISTURBED CONCENTRATION: ICD-10-CM

## 2023-11-20 DIAGNOSIS — E79.0 HYPERURICEMIA: ICD-10-CM

## 2023-11-20 DIAGNOSIS — J45.40 MODERATE PERSISTENT ASTHMA WITHOUT COMPLICATION: Primary | ICD-10-CM

## 2023-11-20 DIAGNOSIS — D22.9 MULTIPLE PIGMENTED NEVI: ICD-10-CM

## 2023-11-20 DIAGNOSIS — E78.5 DYSLIPIDEMIA: ICD-10-CM

## 2023-11-20 DIAGNOSIS — Z82.69 FAMILY HISTORY OF GOUT: ICD-10-CM

## 2023-11-20 DIAGNOSIS — M1A.00X0 CHRONIC IDIOPATHIC GOUT: ICD-10-CM

## 2023-11-20 DIAGNOSIS — E83.52 SERUM CALCIUM ELEVATED: ICD-10-CM

## 2023-11-20 DIAGNOSIS — E66.812 CLASS 2 SEVERE OBESITY DUE TO EXCESS CALORIES WITH SERIOUS COMORBIDITY AND BODY MASS INDEX (BMI) OF 35.0 TO 35.9 IN ADULT (H): ICD-10-CM

## 2023-11-20 LAB
ANION GAP SERPL CALCULATED.3IONS-SCNC: 9 MMOL/L (ref 7–15)
BUN SERPL-MCNC: 16.6 MG/DL (ref 6–20)
CA-I BLD-MCNC: 4.7 MG/DL (ref 4.4–5.2)
CALCIUM SERPL-MCNC: 10 MG/DL (ref 8.6–10)
CHLORIDE SERPL-SCNC: 104 MMOL/L (ref 98–107)
CREAT SERPL-MCNC: 0.88 MG/DL (ref 0.67–1.17)
DEPRECATED HCO3 PLAS-SCNC: 26 MMOL/L (ref 22–29)
EGFRCR SERPLBLD CKD-EPI 2021: >90 ML/MIN/1.73M2
GLUCOSE SERPL-MCNC: 88 MG/DL (ref 70–99)
POTASSIUM SERPL-SCNC: 4 MMOL/L (ref 3.4–5.3)
PTH-INTACT SERPL-MCNC: 25 PG/ML (ref 15–65)
SODIUM SERPL-SCNC: 139 MMOL/L (ref 135–145)
URATE SERPL-MCNC: 6.9 MG/DL (ref 3.4–7)
VIT D+METAB SERPL-MCNC: 23 NG/ML (ref 20–50)

## 2023-11-20 PROCEDURE — 82306 VITAMIN D 25 HYDROXY: CPT | Performed by: FAMILY MEDICINE

## 2023-11-20 PROCEDURE — 80048 BASIC METABOLIC PNL TOTAL CA: CPT | Performed by: FAMILY MEDICINE

## 2023-11-20 PROCEDURE — 90480 ADMN SARSCOV2 VAC 1/ONLY CMP: CPT | Performed by: FAMILY MEDICINE

## 2023-11-20 PROCEDURE — 99215 OFFICE O/P EST HI 40 MIN: CPT | Mod: 25 | Performed by: FAMILY MEDICINE

## 2023-11-20 PROCEDURE — 90471 IMMUNIZATION ADMIN: CPT | Performed by: FAMILY MEDICINE

## 2023-11-20 PROCEDURE — 91320 SARSCV2 VAC 30MCG TRS-SUC IM: CPT | Performed by: FAMILY MEDICINE

## 2023-11-20 PROCEDURE — 36415 COLL VENOUS BLD VENIPUNCTURE: CPT | Performed by: FAMILY MEDICINE

## 2023-11-20 PROCEDURE — 84550 ASSAY OF BLOOD/URIC ACID: CPT | Performed by: FAMILY MEDICINE

## 2023-11-20 PROCEDURE — 82330 ASSAY OF CALCIUM: CPT | Performed by: FAMILY MEDICINE

## 2023-11-20 PROCEDURE — 90472 IMMUNIZATION ADMIN EACH ADD: CPT | Performed by: FAMILY MEDICINE

## 2023-11-20 PROCEDURE — 83970 ASSAY OF PARATHORMONE: CPT | Performed by: FAMILY MEDICINE

## 2023-11-20 PROCEDURE — 90686 IIV4 VACC NO PRSV 0.5 ML IM: CPT | Performed by: FAMILY MEDICINE

## 2023-11-20 PROCEDURE — 90746 HEPB VACCINE 3 DOSE ADULT IM: CPT | Performed by: FAMILY MEDICINE

## 2023-11-20 ASSESSMENT — PAIN SCALES - GENERAL: PAINLEVEL: NO PAIN (0)

## 2023-11-20 NOTE — PROGRESS NOTES
The below note was dictated using voice recognition. Although reviewed after completion, some word and grammatical error may remain .       Assessment & Plan     Moderate persistent asthma without complication  Asthma currently well controlled, on Advair 250/50 1 puff twice a day, rinse well after use has decreased use of albuterol as needed less than once to twice a month.  Or prior to exercise.  Currently will hold off on lung function test and pulmonary referral though may be worth getting a baseline lung function test in the new year.  Vaping less.  Has enough refills till May.    Hypertension blood pressure remains borderline recheck better.  Encouraged a low-salt low caffeine low alcohol in diet, increase regular physical activity and work to lose 10% of total body weight.  Currently opts not to take a medication.  Recommend checking blood pressure at home goal should be less than 130 x 80.  Avoid NSAID's as much as possible.  Recheck at physical in 3 months and if not better consider medication.    History of dyslipidemia elevated triglycerides and low HDL, BMI over 35 with elevated blood pressure, options discussed currently will hold off on meds or a weight specialist referral.  We will check fasting labs at next visit.  Continue to work on diet exercise low carb intake smaller portions more plant-based and some weight loss.    Chronic idiopathic gout and hyperuricemia asymptomatic on allopurinol 200 mg daily.  Avoiding Indocin and other NSAID's as much as possible as it can raise blood pressure.  We will recheck uric acid today and go from there ,continue to avoid red meat as much as possible and limit alcohol intake.    Elevated calcium noted in April and again in June though ionized calcium at that time was normal.  We will recheck calcium along with vitamin D and parathyroid today and if persistently elevated will refer to endocrine.    Noted history of trouble concentrating in the past reported no  depression or anxiety.  Referred for diagnostic eval and after several months did get an evaluation at [facility] in 2022, records were not available but reported was told had a neurocognitive processing disorder related to processing speed. Had had no trouble taking tests in the past.  Instead procrastination was the bigger issue. Referred to a community psychiatry to further evaluate and treat. No apt with psyche made. Did start therapy for skills in 8/2023. Has been doing therapy for the ADHD like symptoms & feels its been going well. He got rid of external distractions and still finding tough time concentrating.  Mind will go to daydreaming when trying to do code.  Was advised by therapist to talk about meds and will call [facility] to make an appointment with psychiatrist there to discuss medications  He will call [facility] where had diagnostic assessment about setting up an appointment with a psychiatrist to discuss medications.    Multiple moles and family history of skin cancer encouraged to schedule with dermatology.  New patch left groin area is most suggestive of a fungal infection like tinea corporis or jock itch.  Advise athlete's foot ointment over-the-counter twice a day to area up to 1 month after resolved and follow-up with Derm on this.    Flu shot given today.  COVID-vaccine given today.  Hepatitis booster given today as not immune despite prior vaccination and will check titers at physical to see if this booster gives adequate immunity.    Return in January /February 2024 for preventive physical and follow-up from today, come fasting for labs , means can take meds with a small sip of water and drink water or coffee without sugar or cream in it, but do not eat at least 8 hours prior to appointment    Hypertension, unspecified type  Hypertension blood pressure remains borderline recheck better.  Encouraged a low-salt low caffeine low alcohol in diet, increase regular physical activity and work to lose 10%  of total body weight.  Currently opts not to take a medication.  Recommend checking blood pressure at home goal should be less than 130 x 80.  Avoid NSAID's as much as possible.  Recheck at physical in 3 months and if not better consider medication.  - Basic metabolic panel  (Ca, Cl, CO2, Creat, Gluc, K, Na, BUN); Future  - Basic metabolic panel  (Ca, Cl, CO2, Creat, Gluc, K, Na, BUN)    Class 2 severe obesity due to excess calories with serious comorbidity and body mass index (BMI) of 35.0 to 35.9 in adult (H)  Dyslipidemia  History of dyslipidemia elevated triglycerides and low HDL, BMI over 35 with elevated blood pressure, options discussed currently will hold off on meds or a weight specialist referral.  We will check fasting labs at next visit.  Continue to work on diet exercise low carb intake smaller portions more plant-based and some weight loss.    Chronic idiopathic gout  Hyperuricemia  Family history of gout  Chronic idiopathic gout and hyperuricemia asymptomatic on allopurinol 200 mg daily.  Avoiding Indocin and other NSAID's as much as possible as it can raise blood pressure.  We will recheck uric acid today and go from there ,continue to avoid red meat as much as possible and limit alcohol intake.  - Basic metabolic panel  (Ca, Cl, CO2, Creat, Gluc, K, Na, BUN); Future  - Uric acid; Future  - Basic metabolic panel  (Ca, Cl, CO2, Creat, Gluc, K, Na, BUN)  - Uric acid    Serum calcium elevated  Elevated calcium noted in April and again in June though ionized calcium at that time was normal.  We will recheck calcium along with vitamin D and parathyroid today and if persistently elevated will refer to endocrine.  - Basic metabolic panel  (Ca, Cl, CO2, Creat, Gluc, K, Na, BUN); Future  - Ionized Calcium; Future  - Vitamin D Deficiency; Future  - Parathyroid Hormone Intact; Future  - Basic metabolic panel  (Ca, Cl, CO2, Creat, Gluc, K, Na, BUN)  - Ionized Calcium  - Vitamin D Deficiency  - Parathyroid  Hormone Intact    Disturbed concentration  Noted history of trouble concentrating in the past reported no depression or anxiety.  Referred for diagnostic eval and after several months did get an evaluation at [facility] in 2022, records were not available but reported was told had a neurocognitive processing disorder related to processing speed. Had had no trouble taking tests in the past.  Instead procrastination was the bigger issue. Referred to a community psychiatry to further evaluate and treat. No apt with psyche made. Did start therapy for skills in 8/2023. Has been doing therapy for the ADHD like symptoms & feels its been going well. He got rid of external distractions and still finding tough time concentrating.  Mind will go to daydreaming when trying to do code.  Was advised by therapist to talk about meds and will call [facility] to make an appointment with psychiatrist there to discuss medications  He will call [facility] where had diagnostic assessment about setting up an appointment with a psychiatrist to discuss medications.    Family history of skin cancer  Multiple pigmented nevi  Multiple moles and family history of skin cancer encouraged to schedule with dermatology.    Tinea corporis  New patch left groin area is most suggestive of a fungal infection like tinea corporis or jock itch.  Advise athlete's foot ointment over-the-counter twice a day to area up to 1 month after resolved and follow-up with Derm on this.    Need for prophylactic vaccination and inoculation against influenza  - INFLUENZA VACCINE IM > 6 MONTHS VALENT IIV4 (AFLURIA/FLUZONE)    Need for COVID-19 vaccine  - COVID-19 12+ (2023-24) (PFIZER)    Need for hepatitis B vaccination  Hepatitis booster given today as not immune despite prior vaccination and will check titers at physical to see if this booster gives adequate immunity.  - HEPATITIS B, ADULT 20+ (ENGERIX-B/RECOMBIVAX HB)    Review of the result(s) of each unique test -  "diagnostics  Diagnosis or treatment significantly limited by social determinants of health - gaps in follow up , cost of care  Ordering of each unique test  Prescription drug management  60 minutes spent by me on the date of the encounter doing chart review, history and exam, documentation and further activities per the note     BMI:   Estimated body mass index is 35.31 kg/m  as calculated from the following:    Height as of this encounter: 1.88 m (6' 2\").    Weight as of this encounter: 124.7 kg (275 lb).   Weight management plan: Discussed healthy diet and exercise guidelines    Work on weight loss  Regular exercise  See Patient Instructions    Mariana Sherman MD  [facility]        Eric Hernandez is a 28 year old, presenting for the following health issues:  Follow Up (asthma) and Hypertension        11/20/2023     3:06 PM   Additional Questions   Roomed by Radha Howell   Accompanied by Self       History of Present Illness       Hypertension: He presents for follow up of hypertension.  He does not check blood pressure  regularly outside of the clinic. Outpatient blood pressures have not been over 140/90. He does not follow a low salt diet.     Reason for visit:  Follow up    He eats 2-3 servings of fruits and vegetables daily.He consumes 1 sweetened beverage(s) daily.He exercises with enough effort to increase his heart rate 20 to 29 minutes per day.  He exercises with enough effort to increase his heart rate 4 days per week. He is missing 2 dose(s) of medications per week.  He is not taking prescribed medications regularly due to remembering to take.     CURRENTLY   Asthma better, act =24, on Advair 250/50 1 puff twice a day, using albuterol only for rescue, use has decreased, once a week or less than 1 / month usually before exercise. Declined pfts for now and opted no pulmonary referral for now. Vaping less. Has refills till may    BP high, recheck better. Not checking at home. Running " a mile on [employer] 4 to 5 / week once a week full day fishing or hiking. One cup of coffee a day, no energy drinks. Alcohol 2 drinks 2/ week. Past 2 yrs cut back on NSAIDS, takes rarely now few times a month now. Was hoping BP would be better. Hesitant to start meds yet.     BMI >35, Not interested in weight specialist and meds right now. Notes been heavy as a child down to 190 in college when eating less out of necessity    Dyslipidemia, Will do fasting sample at next physical     Gout better asymptomatic now on allopurinol 200 mg daily  No gout attacks recently will check uric acid, does miss taking med one to twice a week.  Avoiding indocin and other NSAID'S as much as possible as they can raise BP    Elevated calcium. On no supp. No symptoms. Will recheck today     Noted history of trouble concentrating in the past reported no depression or anxiety.  Referred for diagnostic eval and after several months did get an evaluation at [facility] in 2022, records were not available but reported was told had a neurocognitive processing disorder related to processing speed. Had had no trouble taking tests in the past.  Instead procrastination was the bigger issue. Referred to a community psychiatry to further evaluate and treat. No apt with psyche made. Did start therapy for skills in 8/2023. Has been doing therapy for the ADHD like symptoms & feels its been going well. He got rid of external distractions and still finding tough time concentrating.  Mind will go to daydreaming when trying to do code.  Was advised by therapist to talk about meds and will call [facility] to make an appointment with psychiatrist there to discuss medications     Was to schedule with derm for fh of skin cancer and multiple moles.  A new spot left upper thigh groin area noted in the past month     Vaccines reviewed open to getting flu shot, COVID-vaccine and hepatitis B boosters not immune today    BACKGROUND  28 yr old with BMI > 34, HTN on no  meds, dyslipidemia, moderate persistent asthma on advair 250/50 1 puff twice a day, and albuterol prn, hx of chronic idiopathic gout with tophi & hyperuricemia on allopurinol 200 mg daily & Indocin prn,  Decreased concentration, evaluated at [facility] for possible ADHD, doing skills therapy for this, family history of skin cancer, multiple pigmented nevi, Seen in 2017 for left AC separation s/p surgical repair, 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  [place]  negative.  Moved to [place] in , no prior records available, no regular care prior to that, Seen in urgent care 2019 for toe pain due to history of gout treated with prednisone, tested positive for COVID in 2020 and 2021.  Seen once in [place] on 2021 in urgent care for left toe pain and prior history of gout and given prednisone for 5 days.      Seen first time 22 to establish care and for preventive health and additional concerns. Advised self testicular check regularly. Labs done. Health care maintenance reviewed. Discussed working on healthcare directives.  Was given the Flu & pneumonia vaccines. He was to clarify when he last had Tdap & noted Covid vaccine x3 up-to-date. Asthma was uncontrolled as had run out of his controller meds 6 months prior, noting shortness of breath with activity limiting exercise, resumed generic Wixela  250/50, 1 puff twice a day & if not better can do lung function tests and refer to pulmonary. Noted elevated blood pressure could be white coat hypertension & or  due to weight.  Recommended a low-salt diet, limiting alcohol to 1 drink a day no more than 7 total a week, 10% of total body weight loss & increasing aerobic activity, encouraged to katrina a home blood  pressure machine and checking it at different times different days and calling us if it was consistently more than 130 x 80.  If persistently elevated despite lifestyle changes then to consider medications. Discussed BMI, diet, exercise weight loss, avoiding marijuana as much as possible as this could increase weight gain over time. Reviewed his history of gout diagnosed clinically based on toe pain in the past and family history of gout in dad.  Recent joint pain had resolved.  Had made some recent lifestyle & dietary changes. Would avoid as needed prednisone as much as possible given long-term side effect (can worsen weight gain increases risk of diabetes, ulcers), may use indomethacin he has at home as needed.  Always try to use the lowest dose for the shortest duration of time as Indocin like other NSAID's increased risk of GI bleeding and heart attack.  Other options could be colchicine prn.  Side effects would be nausea vomiting diarrhea etc.  We can also consider referral to rheumatologist if necessary. History of trouble focusing now affecting job: referred to a psychologist for diagnostic evaluation of ADHD.  Encouraged to avoid all marijuana prior to evaluation so they can get an unbiased evaluation.  Based on results could make recommendations for either skill base treatment and/or referral to a community psychiatrist for medication management.  Some medications like stimulants could make blood pressure worse so this might be tricky if his blood pressure remained elevated. Was to return in 1 month for blood pressure check, asthma follow-up etc.   Lab showed a normal cbc, TSH, HBA1c, Micro albumin, lipids other than elevated TG and uric acd was elevated at 8.1 & allopurinol started.    Seen  3/3/2022  & noted Asthma was better. ACT up from 13 to 17. Had only restarted generic Advair 1.5 weeks prior and expected to further improve with time. Continued on generic Advair 1 puff twice a day ( turned out with  his new insurance was cheaper than Wixela previously given). Had been on Advair before with good control in the past. Encouraged not to use albuterol first thing in am but use the Advair instead then see if albuterol needed. Goal was to get to point where albuterol only used as rescue and one inhaler last 1 year preferably.  Encouraged to use albuterol prior to exercise if indicated. Lung function tests ordered. Felt no need for pulmonary referral yet. Encouraged to avoid Vaping/ inhaling cannabis to protect lungs. BP was better, & advised to continue with low salt, low alcohol diet, there was no indication to check an ultrasound or do labs to search for secondary causes of elevated BP at the time. BMI had improved, was to continue to work on diet, exercise and losing 10 % of total body weight over the year and rechecking TG at next physical.  Gout noted stable on allopurinol 100 mg, uric acid level checked to see if dose needed adjustment, was to continue with a hypouricemic diet ( low red meat, low alcohol). Use indomethacin sparingly as could raise BP( not required in a while) & felt no indication currently to see a rheumatologist. Noted his diagnostic eval for decreased concentration was scheduled in May outside [place]. Tdap was  given & advised to check in virtually in 4 weeks or so for asthma check in. BMP came back normal and uric acid was down from 8.1 to 7.6 and continued on allopurinol 100 mg daily.    Seen virtually 4/8/22 noted asthma was doing better. Continued on Advair twice a day & refill sent in 1 yr, to Use albuterol as rescue. Had not scheduled PFT's yet. Felt no indication to see a pulmonologist. Gout was stable on allopurinol 100 mg daily. Uric acid improved from original. Goal was eventually keep it less than 6.5 to prevent attacks. Was to continue with a low uric acid level in diet ( diet / lifestyle measures). Was to get a diagnostic assessment for ADHD eval in may. Advised to return in 5  months for asthma follow up.   No follow up made. Treated for Covid in sept with paxlovid. Noted albuterol inhaler refilled 1/24/22, 2/3/22, 5/27/22, 6/29/22,8/11/22, 9/2/22, 10/22/22 & 1/19/23 and advised follow up.      Seen 1/20/23 for preventive health. Healthcare maintenance reviewed. Did not have healthcare directives and honoring choices given to review. Flu shot up-to-date. COVID-vaccine primary and booster series up-to-date. Prevnar given. Recommended hepatitis B vaccination felt may have had it when younger, was to check records with mom from [place] where grew up and if unable to find to do immune testing in the future and go from there. Moderate persistent asthma  improved from a year ago but still not at goal.  Continued on Advair 250/50 1 puff twice daily rinse mouth well after using inhaler. To limit use of albuterol as rescue only, had been filled 8 times in the last year which suggested high use & need for better asthma control. Initially filling freq was due to not being on Advair then due to losing inhalers at least 3 different times. To recheck asthma in 3 months.  Encouraged to schedule lung function tests  In the meantime encouraged to avoid Vaping and work on weight loss which may help.  If asthma scores continued to be less than 20 to consider changing Advair to Breo, & seeing a pulmonologist to further evaluate and treat.  Asthma action plan verbally in place.  Prevnar 20 given.  Noted had 3 refills on Advair  and albuterol recently refilled so he was to contact us when these were needed. Chronic idiopathic gout and family history of gout  asymptomatic without any flares on allopurinol 100 mg daily.  Had not required indomethacin use in a long time.  Advised to continue to stay well-hydrated, drink alcohol in moderation, decrease red meat consumption and continue current dosing of allopurinol and will check uric acid levels and make further recommendations.  Refill sent in.   New analisa of  HTN made. Blood pressure elevated last year and again in jan  Recheck better but still borderline.  Thought due to white coat hypertension/anxiety.  Did have family history of hypertension and genetic predisposition for this. Opted to hold off on medication for now.  EKG did not show any effect of blood pressure on the heart.  Recommended a low-salt diet, low alcohol intake, low caffeine intake, increase physical activity, at least 5 to 10% of total body weight loss to make a difference in blood pressure, avoid anti-inflammatories as much as possible, given blood pressure would be hesitant to be prescribing any stimulants but could discuss that more with a psychiatrist when saw them.  Prescription given for home blood pressure machine.  To check blood pressure at home if consistently over 130 x 80 may benefit from medication.  To return in 3 months for blood pressure check and if still elevated consider medication at that time. BMI more than 35, reported no significant snoring.  To sleep on side, work on weight loss, low-carb low-fat diet & Increase physical activity. Reviewed multiple pigmented moles and family history of skin cancer & referred to dermatology for skin check. Noted history of trouble concentrating in the past reported no depression or anxiety.  Referred for diagnostic eval and after several months did get an evaluation at [facility], records were not available but reported was told had a neurocognitive processing disorder related to processing speed.  Had had no trouble taking tests in the past.  Instead procrastination was the bigger issue. Referred to a community psychiatry to further evaluate and treat. Noted history of COVID treated with Paxlovid in September 2022 with no residual symptoms. Micro albumin was normal, HDL low, triglycerides elevated, CMP otherwise unremarkable, TSH normal, uric acid 7.8, CBC and hemoglobin A1c normal.  On 2/24/2023 called about an albuterol refill.  Advised pulmonary  referral and PFT's as recently filled in January. These were not scheduled.    Seen 4/28/23, Asthma better, continued on Advair 250/50 1 puff twice a day, to rinse well after using. Use albuterol only for rescue, use had decreased, Reminded to schedule lung function tests. Held off on pulmonary referral. Encouraged to avoid Vaping. Refills for Advair sent in 1 yr. Sent in one albuterol inhaler for rescue. Had a lot left on one sent in feb.  BP high, recheck better. Encouraged low salt, low caffeine, low alcohol in diet and increased regular physical activity and work to loose 10 % of total body weight. Opted no meds. To recheck in 6 months. Encouraged to get a home blood pressure machine.  To check blood pressure at home if consistently over 130 x 80 may benefit from medication.   Dyslipidemia, / BMI >34, weight down a bit, to continue to work on diet, exercise, low carb, smaller portion, more plant based mediterranean diet and weight loss. To recheck in jan at next physical   Gout better asymptomatic on allopurinol 100 mg daily, noted was taking more consistently. Had had no gout attacks recently was to wait to see labs to refill med in case dose needed changing. To avoid indocin and other NSAID'S as much as possible as they could raise BP  Reminded to schedule with derm for fh of skin cancer and multiple moles. Sun protection advised. Was to follow up mental health as planned for trouble concentrating and procrastination in August. Checked for hep B immunity as awaited his mom to send us his childhood immunizations, if not immune to consider revaccination against hep B    BMP showed mildly elevated calcium advised to recheck in 1 month.  Uric acid was elevated at 7.8.  Recheck labs on 6/16 showed calcium of 10.1 ionized calcium normal uric acid elevated at 7.6 and not immune to hepatitis B.  Allopurinol increased to 200 mg on 6/29 and to recheck labs at follow-up.  No appointment with the community psychiatrist  "May did start seeing a therapist on 8/22 for ADHD related symptoms.  Seen them 9/1, 9/22, 10/6, 10/22 and most recently 11/17/2023.    Review of Systems   Constitutional, HEENT, cardiovascular, pulmonary, GI, , musculoskeletal, neuro, skin, endocrine and psych systems are negative, except as otherwise noted.      Objective    /78 (BP Location: Right arm, Patient Position: Sitting, Cuff Size: Adult Large)   Pulse 55   Temp 97.4  F (36.3  C) (Temporal)   Resp 16   Ht 1.88 m (6' 2\")   Wt 124.7 kg (275 lb)   SpO2 99%   BMI 35.31 kg/m    Body mass index is 35.31 kg/m .  Physical Exam   GENERAL: healthy, alert, no distress, and obese  EYES: Eyes grossly normal to inspection, PERRL and conjunctivae and sclerae normal, glasses  HENT: ear canals and TM's normal, nose and mouth without ulcers or lesions  NECK: no adenopathy, no asymmetry, masses, or scars and thyroid normal to palpation  RESP: lungs clear to auscultation - no rales, rhonchi or wheezes  CV: regular rate and rhythm, normal S1 S2, no S3 or S4, no murmur, click or rub, no peripheral edema and peripheral pulses strong  ABDOMEN: soft, nontender, no hepatosplenomegaly, no masses and bowel sounds normal  MS: no gross musculoskeletal defects noted, no edema  SKIN: no suspicious lesions or rashes  NEURO: Normal strength and tone, mentation intact and speech normal  PSYCH: mentation appears normal, affect normal/bright    No results found for any visits on 11/20/23.  No results found for this or any previous visit (from the past 24 hour(s)).                  "

## 2023-11-20 NOTE — PATIENT INSTRUCTIONS
Asthma currently well controlled, on Advair 250/50 1 puff twice a day, rinse well after use has decreased use of albuterol as needed less than once to twice a month.  Or prior to exercise.  Currently will hold off on lung function test and pulmonary referral though may be worth getting a baseline lung function test in the new year.  Vaping less.  Has enough refills till May.    Hypertension blood pressure remains borderline recheck better.  Encouraged a low-salt low caffeine low alcohol in diet, increase regular physical activity and work to lose 10% of total body weight.  Currently opts not to take a medication.  Recommend checking blood pressure at home goal should be less than 130 x 80.  Avoid NSAIDs as much as possible.  Recheck at physical in 3 months and if not better consider medication.    History of dyslipidemia elevated triglycerides and low HDL, BMI over 35 with elevated blood pressure, options discussed currently will hold off on meds await specialist referral.  We will check fasting labs at next visit.  Continue to work on diet exercise low carb intake smaller portions more plant-based and some weight loss.    Chronic idiopathic gout and hyperuricemia asymptomatic on allopurinol 200 mg daily.  Avoiding Indocin and other NSAIDs as much as possible as it can raise blood pressure.  We will recheck uric acid today and go from there continue to avoid red meat as much as possible and limit alcohol intake.    Elevated calcium noted in April and again in June though ionized calcium at that time was normal.  We will recheck calcium along with vitamin D and parathyroid today and if persistently elevated will refer to endocrine.    History of poor concentration and ADHD-like symptoms.  Has been doing therapy for this and has gotten rid of external distractions but still has a tough time concentrating and therapist recommended to consider medication. Please call [facility], where had diagnostic assessment about  setting up an appointment with a psychiatrist to discuss medications.    Multiple moles and family history of skin cancer encouraged to schedule with dermatology.  New patch left groin area is most suggestive of a fungal infection like tinea corporis or jock itch.  Advise athlete's foot ointment over-the-counter twice a day to area up to 1 month after resolved and follow-up with Derm on this.    Flu shot given today.  COVID-vaccine given today.  Hepatitis booster given today as not immune despite prior vaccination and will check titers at physical to see if gives you adequate immunity.      Return in January /February 2024 for preventive physical and follow-up from today, come fasting for labs , means can take meds with a small sip of water and drink water or coffee without sugar or cream in it, but do not eat at least 8 hours prior to appointment    The above note was dictated using voice recognition. Although reviewed after completion, some word and grammatical error may remain .

## 2023-11-20 NOTE — NURSING NOTE
Prior to immunization administration, verified patients identity using patient s name and date of birth. Please see Immunization Activity for additional information.     Screening Questionnaire for Adult Immunization    Are you sick today?   No   Do you have allergies to medications, food, a vaccine component or latex?   Yes   Have you ever had a serious reaction after receiving a vaccination?   No   Do you have a long-term health problem with heart, lung, kidney, or metabolic disease (e.g., diabetes), asthma, a blood disorder, no spleen, complement component deficiency, a cochlear implant, or a spinal fluid leak?  Are you on long-term aspirin therapy?   Yes- Asthma   Do you have cancer, leukemia, HIV/AIDS, or any other immune system problem?   No   Do you have a parent, brother, or sister with an immune system problem?   No   In the past 3 months, have you taken medications that affect  your immune system, such as prednisone, other steroids, or anticancer drugs; drugs for the treatment of rheumatoid arthritis, Crohn s disease, or psoriasis; or have you had radiation treatments?   No   Have you had a seizure, or a brain or other nervous system problem?   No   During the past year, have you received a transfusion of blood or blood    products, or been given immune (gamma) globulin or antiviral drug?   No   For women: Are you pregnant or is there a chance you could become       pregnant during the next month?   No   Have you received any vaccinations in the past 4 weeks?   No     Immunization questionnaire was positive for at least one answer.  Notified Dr. Sherman.      Patient instructed to remain in clinic for 15 minutes afterwards, and to report any adverse reactions.     Screening performed by Lesly Pearce RN on 11/20/2023 at 4:01 PM.

## 2023-12-01 ENCOUNTER — VIRTUAL VISIT (OUTPATIENT)
Dept: PSYCHOLOGY | Facility: CLINIC | Age: 28
End: 2023-12-01
Payer: COMMERCIAL

## 2023-12-01 DIAGNOSIS — F41.1 GENERALIZED ANXIETY DISORDER: ICD-10-CM

## 2023-12-01 DIAGNOSIS — F90.8 ATTENTION DEFICIT HYPERACTIVITY DISORDER (ADHD), OTHER TYPE: Primary | ICD-10-CM

## 2023-12-01 PROCEDURE — 90834 PSYTX W PT 45 MINUTES: CPT | Mod: VID | Performed by: COUNSELOR

## 2023-12-01 NOTE — PROGRESS NOTES
[facility]                                     Progress Note    Patient Name: David Martin  Date: 12/01/23         Service Type: Individual      Session Start Time: 9:05 am  Session End Time: 9:55 pm     Session Length: 50 minutes    Session #: 5    Attendees: Client attended alone    Service Modality:  Video Visit:      Provider verified identity through the following two step process.  Patient provided:  Patient is known previously to provider    Telemedicine Visit: The patient's condition can be safely assessed and treated via synchronous audio and visual telemedicine encounter.      Reason for Telemedicine Visit: Services only offered telehealth    Originating Site (Patient Location): Patient's home    Distant Site (Provider Location): Provider Remote Setting- Home Office    Consent:  The patient/guardian has verbally consented to: the potential risks and benefits of telemedicine (video visit) versus in person care; bill my insurance or make self-payment for services provided; and responsibility for payment of non-covered services.     Patient would like the video invitation sent by:  My Chart    Mode of Communication:  Video Conference via [facility]    Distant Location (Provider):  Off-site    As the provider I attest to compliance with applicable laws and regulations related to telemedicine.    DATA  Interactive Complexity: No  Crisis: No        Progress Since Last Session (Related to Symptoms / Goals / Homework):   Symptoms: Improving symptom awareness and positive behavioral changes, reduced anxiety    Homework: Achieved / completed to satisfaction      Episode of Care Goals: Satisfactory progress - ACTION (Actively working towards change); Intervened by reinforcing change plan / affirming steps taken     Current / Ongoing Stressors and Concerns:   Pt is currently seeking therapy due to ongoing ADHD-like symptoms (related to processing delay), and procrastination at work which results  in anxiety around deadlines. Since last session, pt reported that work has been slower and he had a good thanksgiving holiday. Pt's internal clock and awareness around his distractibility has continued to improve which has been helpful. Pt has been staying more active by running on the treadmill, which he has found has been really helpful for his mental energy/feelings. Pt is now finding that his internal motivation to work out is more driven by supporting his MH instead of negative self-talk about body image. Pt's fishing season is over so he has been getting outside less. Pt is feeling less stressed overall. Pt has noticed that he is doing better with being pro-active in getting daily or outside work tasks done, which have helped him feel more productive outside work as well. Upon reflection pt noticed that his evening anxiety has greatly improved and his self-talk has become a lot more positive overall. Pt continues to feel the biggest barrier he is dealing with now is his internal concentration issues, where his mind will wander or not stay on task. Pt reflected on how he recently has become more aware of this and found himself hyper-focusing later at night and noticed that the internal concentration issue were less present during this time. Pt explored possible contributing factors and agreed that he would need to think more on these for next session. Pt did talk with his PCP about ADHD meds, which they expressed they weren't comfortable prescribing stimulant meds, recommending they find another provider to address this. Pt discussed options for this, both community based psychiatry and collaborative care, and expressed still being interested in exploring the ADHD meds options.        Treatment Objective(s) Addressed in This Session:   identify and use 3 strategies to improve organization  identify thoughts, triggers, and  stressors which contribute to feelings of anxiety  use cognitive strategies identified in  therapy to challenge anxious thoughts  Identify negative self-talk and behaviors: challenge core beliefs, myths, and actions  Improve concentration, focus, and mindfulness in daily activities   identify two areas of life that you would like to have improved functioning  identify at least 3 adaptive coping statements to counteract negative thoughts     Intervention:   CBT: reviewed how environmental/situational factors impact his MH    Motivational Interviewing    MI Intervention: Expressed Empathy/Understanding, Supported Autonomy, Collaboration, Evocation, Open-ended questions, Reflections: simple and complex, Change talk (evoked), and Reframe     Change Talk Expressed by the Patient: Desire to change Ability to change Reasons to change Committment to change Activation Taking steps    Provider Response to Change Talk: E - Evoked more info from patient about behavior change, A - Affirmed patient's thoughts, decisions, or attempts at behavior change, R - Reflected patient's change talk, and S - Summarized patient's change talk statements    Psychodynamic: Processed through internal experiences related to his current symptoms and how things used to be with MH  Solution Focused: Identified immediate areas of concern to address and discussed potential barriers to success    Assessments completed prior to visit:  PHQ2:       12/1/2023     9:03 AM 8/22/2023    12:48 PM 4/28/2023     9:24 AM 1/23/2023    10:23 AM 3/3/2022     8:56 AM 1/24/2022    11:45 AM   PHQ-2 ( 1999 [employer])   Q1: Little interest or pleasure in doing things 0 0 0 0 0 0   Q2: Feeling down, depressed or hopeless 0 0 0 0 0 0   PHQ-2 Score 0 0 0 0 0 0   Q1: Little interest or pleasure in doing things Not at all Not at all  Not at all    Not at all Not at all Not at all   Q2: Feeling down, depressed or hopeless Not at all Not at all  Not at all    Not at all Not at all Not at all   PHQ-2 Score 0 0  0    0 0 0     GAD2:       8/22/2023    12:56 PM 12/1/2023      9:03 AM   LUAN-2   Feeling nervous, anxious, or on edge 2 0   Not being able to stop or control worrying 1 0   LUAN-2 Total Score 3 0     GAD7:       8/22/2023    12:56 PM   LUAN-7 SCORE   Total Score 7 (mild anxiety)   Total Score 7     Eunice Suicide Severity Rating Scale (Lifetime/Recent)      8/22/2023     1:39 PM   Eunice Suicide Severity Rating (Lifetime/Recent)   Q1 Wish to be Dead (Lifetime) N   Q2 Non-Specific Active Suicidal Thoughts (Lifetime) N   Actual Attempt (Lifetime) N   Has subject engaged in non-suicidal self-injurious behavior? (Lifetime) N   Interrupted Attempts (Lifetime) N   Aborted or Self-Interrupted Attempt (Lifetime) N   Preparatory Acts or Behavior (Lifetime) N   Calculated C-SSRS Risk Score (Lifetime/Recent) No Risk Indicated         ASSESSMENT: Current Emotional / Mental Status (status of significant symptoms):   Risk status (Self / Other harm or suicidal ideation)   Patient denies current fears or concerns for personal safety.   Patient denies current or recent suicidal ideation or behaviors.   Patient denies current or recent homicidal ideation or behaviors.   Patient denies current or recent self injurious behavior or ideation.   Patient denies other safety concerns.   Patient reports there has been no change in risk factors since their last session.     Patient reports there has been no change in protective factors since their last session.     Recommended that patient call 911 or go to the local ED should there be a change in any of these risk factors.     Appearance:   Appropriate    Eye Contact:   Good    Psychomotor Behavior: Normal    Attitude:   Cooperative  Interested Friendly Pleasant   Orientation:   All   Speech    Rate / Production: Normal/ Responsive    Volume:  Normal    Mood:    Anxious  Normal   Affect:    Appropriate    Thought Content:  Clear    Thought Form:  Coherent  Logical    Insight:    Good  and Intellectual Insight     Medication Review:   No current  psychiatric medications prescribed     Medication Compliance:   NA     Changes in Health Issues:   None reported     Chemical Use Review:   Substance Use: Chemical use reviewed, no active concerns identified      Tobacco Use: No current tobacco use.      Diagnosis:  1. Attention deficit hyperactivity disorder (ADHD), other type    2. Generalized anxiety disorder        Collateral Reports Completed:   Not Applicable    PLAN: (Patient Tasks / Therapist Tasks / Other)  Review ADHD Tips Handout  Try doing some fly fishing knots  Reflect on possible factors that impact internal concentration  Continue to use productivity behavior replacements    Yeyo Isbell, [facility]                                                         ______________________________________________________________________    Individual Treatment Plan    Patient's Name: David Martin  YOB: 1995    Date of Creation: 9/22/23  Date Treatment Plan Last Reviewed/Revised: 9/22/23    DSM5 Diagnoses: Attention-Deficit/Hyperactivity Disorder  314.01 (F90.9) Unspecified Attention -Deficit / Hyperactivity Disorder or 300.02 (F41.1) Generalized Anxiety Disorder  Psychosocial / Contextual Factors: Heterosexual, CIS male, , father of several young children, employed full time, hx of depression, and marital stressors  PROMIS (reviewed every 90 days):   PROMIS-10 Scores        8/22/2023    12:56 PM   PROMIS-10 Total Score w/o Sub Scores   PROMIS TOTAL - SUBSCORES 30       Referral / Collaboration:  Referral to another professional/service is not indicated at this time..    Anticipated number of session for this episode of care: 6-9 sessions  Anticipation frequency of session: Biweekly  Anticipated Duration of each session: 38-52 minutes  Treatment plan will be reviewed in 90 days or when goals have been changed.     Measurable Treatment Goal(s) related to diagnosis / functional impairment(s)  Goal 1: Patient will improve his ability to  focus/concentrate and be better at not procrastinating to reduce his anxiety around deadlines.    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      Objective #A (Patient Action)    Patient will identify and use 3 strategies to improve organization  identify thoughts, triggers and  stressors which contribute to feelings of anxiety  use cognitive strategies identified in therapy to challenge anxious thoughts  learn at least 3 self-regulation strategies.  Status: New - Date: 9/22/23      Intervention(s)  Therapist will assign homework related to target objective with the intent to promote pt autonomy and better manage MH.  Facilitate increase in self-awareness  Provide psycho-education on organization and routine building  Teach/promote utilization of habit forming skills and coping skills    Objective #B Reduce avoidance and perfectionism  Patient will identify 3 strategies to more effectively address stressors  identify patterns of escalation  (i.e. tightness in chest, flushed face, increased heart rate, clenched hands, etc.)  identify at least 3 techniques for intervening on the escalation  Identify negative self-talk and behaviors: challenge core beliefs, myths, and actions  Improve concentration, focus, and mindfulness in daily activities   identify two areas of life that you would like to have improved functioning  identify at least 3 adaptive coping statements to counteract negative thoughts.  Status: New - Date: 9/22/23      Intervention(s)  Therapist will assign homework related to target objective with the intent to promote pt autonomy and better manage MH.  Facilitate increase in self-awareness  Provide psycho-education on anxiety-avoidance cycle  Teach/promote utilization of behavior modification    Goal 2: Patient will get more involved in his community and foster feelings of authenticity by living his values.    I  will know I've met my goal when I feel like I am putting my money where my mouth is.      Objective #A (Patient Action)    Status: New - Date: 9/22/23      Patient will identify and compliment positives at least 3 times daily to support positive self-image  participate in community activities to improve mood  attend and participate in social or recreational activities consistently once per week  practice one mindfulness skill each day for 5-10 minutes  use positive self-affirmations daily  identify at least 3 self-care activities.    Intervention(s)  Therapist will assign homework related to target objective with the intent to promote pt autonomy and better manage MH.  Facilitate increase in self-awareness  Provide psycho-education on positive self-talk and internal motivation development  Teach/promote utilization of narrative therapy techniques     Patient has reviewed and agreed to the above plan.      Yeyo Isbell, [facility]  September 22, 2023

## 2023-12-15 ENCOUNTER — VIRTUAL VISIT (OUTPATIENT)
Dept: PSYCHOLOGY | Facility: CLINIC | Age: 28
End: 2023-12-15
Payer: COMMERCIAL

## 2023-12-15 DIAGNOSIS — F41.1 GENERALIZED ANXIETY DISORDER: Primary | ICD-10-CM

## 2023-12-15 DIAGNOSIS — F90.8 ATTENTION DEFICIT HYPERACTIVITY DISORDER (ADHD), OTHER TYPE: ICD-10-CM

## 2023-12-15 PROCEDURE — 90837 PSYTX W PT 60 MINUTES: CPT | Mod: VID | Performed by: COUNSELOR

## 2023-12-15 NOTE — PROGRESS NOTES
[facility]                                     Progress Note    Patient Name: David Martin  Date: 12/15/23         Service Type: Individual      Session Start Time: 9:06 am  Session End Time: 10:00 pm     Session Length: 54 minutes    Session #: 6    Attendees: Client attended alone    Service Modality:  Video Visit:      Provider verified identity through the following two step process.  Patient provided:  Patient is known previously to provider    Telemedicine Visit: The patient's condition can be safely assessed and treated via synchronous audio and visual telemedicine encounter.      Reason for Telemedicine Visit: Services only offered telehealth    Originating Site (Patient Location): Patient's home    Distant Site (Provider Location): Provider Remote Setting- Home Office    Consent:  The patient/guardian has verbally consented to: the potential risks and benefits of telemedicine (video visit) versus in person care; bill my insurance or make self-payment for services provided; and responsibility for payment of non-covered services.     Patient would like the video invitation sent by:  My Chart    Mode of Communication:  Video Conference via [employer]    Distant Location (Provider):  Off-site    As the provider I attest to compliance with applicable laws and regulations related to telemedicine.    DATA  Interactive Complexity: No  Crisis: No  Extended Session (53+ minutes): PROLONGED SERVICE IN THE OUTPATIENT SETTING REQUIRING DIRECT (FACE-TO-FACE) PATIENT CONTACT BEYOND THE USUAL SERVICE:    - Treatment protocol required additional time to complete, due to the nature of diagnosis being treated.  See Interventions section for details (Psycho-ed on CBTI protocols and habit forming skills/tools)        Progress Since Last Session (Related to Symptoms / Goals / Homework):   Symptoms: Improving symptom awareness and positive behavioral changes, reduced anxiety    Homework: Achieved / completed  to satisfaction      Episode of Care Goals: Satisfactory progress - ACTION (Actively working towards change); Intervened by reinforcing change plan / affirming steps taken     Current / Ongoing Stressors and Concerns:   Pt is currently seeking therapy due to ongoing ADHD-like symptoms (related to processing delay), and procrastination at work which results in anxiety around deadlines. Since last session, pt reported that work has picked up again, which has kept him busy and he has been feeling productive. Pt briefly discussed ADHD med plans and is going to reach out to a community provider to get that need met. Pt has noticed that when he realizes that he is distracted by external factors (ie his phone) is when it puts them away, and would like to figure out how to be more proactive with doing this before he is distracted. Pt discussed strategies around how he can work on being proactive, including using timers and habits pairing. Pt processed through how he would like to work on moving his evening flow-state into earlier in the day. Pt reflected on what factors may be contributing to this pattern and if any of those things can be utilized/modified to help move it more into his regular work hours. Pt identified having a check in at the end of his day with a co-worker may be key. Pt identified that getting into a more regular schedule may be helpful as well. Pt clarified that after college, he found himself rebelling against having to have a consistent schedule given how structured his life was throughout school. Pt reflected that he would like to get into a routine as he thinks that is something that would help him feel more on top of things outside of work. After discussing possible approaches, pt agreed that getting his sleep to be more consistent would be a good place to start. Psycho-ed on sleep hygiene and stimulus control was provided. Habit forming tools, like habit pairing, was discussed as well which pt  felt resonated with him. Pt has upcoming holiday stuff that he is looking forward to and a trip to [place] which is excited to go on.  Session went longer then anticipated due to psycho-ed being provided.      Treatment Objective(s) Addressed in This Session:   identify and use 3 strategies to improve organization  identify thoughts, triggers, and  stressors which contribute to feelings of anxiety  use cognitive strategies identified in therapy to challenge anxious thoughts  Identify negative self-talk and behaviors: challenge core beliefs, myths, and actions  Improve concentration, focus, and mindfulness in daily activities   identify two areas of life that you would like to have improved functioning  identify at least 3 adaptive coping statements to counteract negative thoughts     Intervention:   CBT: reviewed how environmental/situational factors impact his MH    Motivational Interviewing    MI Intervention: Expressed Empathy/Understanding, Supported Autonomy, Collaboration, Evocation, Open-ended questions, Reflections: simple and complex, Change talk (evoked), and Reframe     Change Talk Expressed by the Patient: Desire to change Ability to change Reasons to change Need to change Committment to change Activation Taking steps    Provider Response to Change Talk: E - Evoked more info from patient about behavior change, A - Affirmed patient's thoughts, decisions, or attempts at behavior change, R - Reflected patient's change talk, and S - Summarized patient's change talk statements    Psychodynamic: Processed through internal experiences related to his current symptoms and how things used to be with MH  Solution Focused: Identified immediate areas of concern to address and discussed potential barriers to success    Assessments completed prior to visit:  PHQ2:       12/1/2023     9:03 AM 8/22/2023    12:48 PM 4/28/2023     9:24 AM 1/23/2023    10:23 AM 3/3/2022     8:56 AM 1/24/2022    11:45 AM   PHQ-2 ( 1999  [employer])   Q1: Little interest or pleasure in doing things 0 0 0 0 0 0   Q2: Feeling down, depressed or hopeless 0 0 0 0 0 0   PHQ-2 Score 0 0 0 0 0 0   Q1: Little interest or pleasure in doing things Not at all Not at all  Not at all    Not at all Not at all Not at all   Q2: Feeling down, depressed or hopeless Not at all Not at all  Not at all    Not at all Not at all Not at all   PHQ-2 Score 0 0  0    0 0 0     GAD2:       8/22/2023    12:56 PM 12/1/2023     9:03 AM   LUAN-2   Feeling nervous, anxious, or on edge 2 0   Not being able to stop or control worrying 1 0   LUAN-2 Total Score 3 0     GAD7:       8/22/2023    12:56 PM   LUAN-7 SCORE   Total Score 7 (mild anxiety)   Total Score 7     Young Suicide Severity Rating Scale (Lifetime/Recent)      8/22/2023     1:39 PM   Young Suicide Severity Rating (Lifetime/Recent)   Q1 Wish to be Dead (Lifetime) N   Q2 Non-Specific Active Suicidal Thoughts (Lifetime) N   Actual Attempt (Lifetime) N   Has subject engaged in non-suicidal self-injurious behavior? (Lifetime) N   Interrupted Attempts (Lifetime) N   Aborted or Self-Interrupted Attempt (Lifetime) N   Preparatory Acts or Behavior (Lifetime) N   Calculated C-SSRS Risk Score (Lifetime/Recent) No Risk Indicated         ASSESSMENT: Current Emotional / Mental Status (status of significant symptoms):   Risk status (Self / Other harm or suicidal ideation)   Patient denies current fears or concerns for personal safety.   Patient denies current or recent suicidal ideation or behaviors.   Patient denies current or recent homicidal ideation or behaviors.   Patient denies current or recent self injurious behavior or ideation.   Patient denies other safety concerns.   Patient reports there has been no change in risk factors since their last session.     Patient reports there has been no change in protective factors since their last session.     Recommended that patient call 911 or go to the local ED should there be a change in  any of these risk factors.     Appearance:   Appropriate    Eye Contact:   Good    Psychomotor Behavior: Normal    Attitude:   Cooperative  Interested Friendly Pleasant   Orientation:   All   Speech    Rate / Production: Normal/ Responsive Talkative    Volume:  Normal    Mood:    Anxious  Normal   Affect:    Appropriate    Thought Content:  Clear    Thought Form:  Coherent  Logical    Insight:    Good  and Intellectual Insight     Medication Review:   No current psychiatric medications prescribed     Medication Compliance:   NA     Changes in Health Issues:   None reported     Chemical Use Review:   Substance Use: Chemical use reviewed, no active concerns identified      Tobacco Use: No current tobacco use.      Diagnosis:  1. Generalized anxiety disorder    2. Attention deficit hyperactivity disorder (ADHD), other type      Collateral Reports Completed:   Not Applicable    PLAN: (Patient Tasks / Therapist Tasks / Other)  Follow-up on ADHD meds with community psychiatry  Implement regular bed and wake time  Reduce stimulus in bed  Try pairing phone with coffee to pro-actively put it away  Set up check-in at end of work day    Yeyo Isbell [facility]                                                         ______________________________________________________________________    Individual Treatment Plan    Patient's Name: David Martin  YOB: 1995    Date of Creation: 9/22/23  Date Treatment Plan Last Reviewed/Revised: 9/22/23    DSM5 Diagnoses: Attention-Deficit/Hyperactivity Disorder  314.01 (F90.9) Unspecified Attention -Deficit / Hyperactivity Disorder or 300.02 (F41.1) Generalized Anxiety Disorder  Psychosocial / Contextual Factors: Heterosexual, CIS male, , father of several young children, employed full time, hx of depression, and marital stressors  PROMIS (reviewed every 90 days):   PROMIS-10 Scores        8/22/2023    12:56 PM 12/15/2023     9:04 AM   PROMIS-10 Total Score w/o Sub  Scores   PROMIS TOTAL - SUBSCORES 30 30       Referral / Collaboration:  Referral to another professional/service is not indicated at this time..    Anticipated number of session for this episode of care: 6-9 sessions  Anticipation frequency of session: Biweekly  Anticipated Duration of each session: 38-52 minutes  Treatment plan will be reviewed in 90 days or when goals have been changed.     Measurable Treatment Goal(s) related to diagnosis / functional impairment(s)  Goal 1: Patient will improve his ability to focus/concentrate and be better at not procrastinating to reduce his anxiety around deadlines.    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      Objective #A (Patient Action)    Patient will identify and use 3 strategies to improve organization  identify thoughts, triggers and  stressors which contribute to feelings of anxiety  use cognitive strategies identified in therapy to challenge anxious thoughts  learn at least 3 self-regulation strategies.  Status: New - Date: 9/22/23      Intervention(s)  Therapist will assign homework related to target objective with the intent to promote pt autonomy and better manage MH.  Facilitate increase in self-awareness  Provide psycho-education on organization and routine building  Teach/promote utilization of habit forming skills and coping skills    Objective #B Reduce avoidance and perfectionism  Patient will identify 3 strategies to more effectively address stressors  identify patterns of escalation  (i.e. tightness in chest, flushed face, increased heart rate, clenched hands, etc.)  identify at least 3 techniques for intervening on the escalation  Identify negative self-talk and behaviors: challenge core beliefs, myths, and actions  Improve concentration, focus, and mindfulness in daily activities   identify two areas of life that you would like to have improved  functioning  identify at least 3 adaptive coping statements to counteract negative thoughts.  Status: New - Date: 9/22/23      Intervention(s)  Therapist will assign homework related to target objective with the intent to promote pt autonomy and better manage MH.  Facilitate increase in self-awareness  Provide psycho-education on anxiety-avoidance cycle  Teach/promote utilization of behavior modification    Goal 2: Patient will get more involved in his community and foster feelings of authenticity by living his values.    I will know I've met my goal when I feel like I am putting my money where my mouth is.      Objective #A (Patient Action)    Status: New - Date: 9/22/23      Patient will identify and compliment positives at least 3 times daily to support positive self-image  participate in community activities to improve mood  attend and participate in social or recreational activities consistently once per week  practice one mindfulness skill each day for 5-10 minutes  use positive self-affirmations daily  identify at least 3 self-care activities.    Intervention(s)  Therapist will assign homework related to target objective with the intent to promote pt autonomy and better manage MH.  Facilitate increase in self-awareness  Provide psycho-education on positive self-talk and internal motivation development  Teach/promote utilization of narrative therapy techniques     Patient has reviewed and agreed to the above plan.      Yeyo Isbell, IGLBERTC  September 22, 2023

## 2024-01-12 ENCOUNTER — VIRTUAL VISIT (OUTPATIENT)
Dept: PSYCHOLOGY | Facility: CLINIC | Age: 29
End: 2024-01-12
Payer: COMMERCIAL

## 2024-01-12 DIAGNOSIS — F41.1 GENERALIZED ANXIETY DISORDER: Primary | ICD-10-CM

## 2024-01-12 DIAGNOSIS — F90.8 ATTENTION DEFICIT HYPERACTIVITY DISORDER (ADHD), OTHER TYPE: ICD-10-CM

## 2024-01-12 PROCEDURE — 90837 PSYTX W PT 60 MINUTES: CPT | Mod: 95 | Performed by: COUNSELOR

## 2024-01-12 NOTE — PROGRESS NOTES
[facility]                                     Progress Note    Patient Name: David Martin  Date: 1/12/24         Service Type: Individual      Session Start Time: 9:00 am  Session End Time: 9:57 pm     Session Length: 57 minutes    Session #: 7    Attendees: Client attended alone    Service Modality:  Video Visit:      Provider verified identity through the following two step process.  Patient provided:  Patient is known previously to provider    Telemedicine Visit: The patient's condition can be safely assessed and treated via synchronous audio and visual telemedicine encounter.      Reason for Telemedicine Visit: Services only offered telehealth    Originating Site (Patient Location): Patient's home    Distant Site (Provider Location): Provider Remote Setting- Home Office    Consent:  The patient/guardian has verbally consented to: the potential risks and benefits of telemedicine (video visit) versus in person care; bill my insurance or make self-payment for services provided; and responsibility for payment of non-covered services.     Patient would like the video invitation sent by:  My Chart    Mode of Communication:  [facility]    Distant Location (Provider):  Off-site    As the provider I attest to compliance with applicable laws and regulations related to telemedicine.    DATA  Interactive Complexity: No  Crisis: No  Extended Session (53+ minutes): PROLONGED SERVICE IN THE OUTPATIENT SETTING REQUIRING DIRECT (FACE-TO-FACE) PATIENT CONTACT BEYOND THE USUAL SERVICE:    - Treatment protocol required additional time to complete, due to the nature of diagnosis being treated.  See Interventions section for details (Psycho-ed on CBTI protocols and habit forming skills/tools)        Progress Since Last Session (Related to Symptoms / Goals / Homework):   Symptoms: Improving pt endorses feeling like he is managing his MH and focus issues much better    Homework: Achieved /  "completed to satisfaction      Episode of Care Goals: Satisfactory progress - ACTION (Actively working towards change); Intervened by reinforcing change plan / affirming steps taken     Current / Ongoing Stressors and Concerns:   Pt is currently seeking therapy due to ongoing ADHD-like symptoms (related to processing delay), and procrastination at work which results in anxiety around deadlines. Since last session, pt had a good holiday and trip down to [place] with his family. Pt felt it was a good chance to relax and feels refreshed coming back to work now. This week has been a bit stressful and anxiety inducing due to layoffs, though he wasn't laid off himself, though this has resulted in them being short staffed now. Pt reflected that while he is glad he didn't get laid off, he was able to consider what the future could look like if he wasn't with them anymore. Pt does think that this past week has been really busy and he has noticed that because he has been so busy that when he gets home he feels better about turning off at home. Pt verbalized that his strategies around \"trading out\" his phone out in the morning to not have it during his work day have been successful. Pt hasn't followed-up on medication yet, though is still interested in so is going to try to get an appointment made to explore options related to this. Pt's sleep has greatly improved since he stopped bringing activities into bed and has found that he can fall asleep much quicker now and is waking up without having to fight his alarms like he used to. Bedtime and waketime have been fairly consistent as well, which has been helpful too, which pt has been slowly shifting to give himself more time in the morning. Pt is also working on getting back into his gym routine as well, which fell off because of vacation and getting sick before the holidays. Pt was able to get out fishing which felt really good as well and has started tying flies, as a winter " "hobby, which has been a fun thing to do. Pt also found that the fly tying has been a novel avenue for him to explore his creative side. Pt is also thinking about how he can propose to his girlfriend, which is somewhat anxiety inducing but also a mix of excitement, and yet he is finding himself not avoiding. Pt was able to agree with the mindset of \"make it memorable but it doesn't need to be perfect\", which he also verbalized was a very helpful mindset to take. Therapist (writer) and pt reviewed and updated his trx plan. Session went longer then anticipated due to having to update pt's trx plan today.      Treatment Objective(s) Addressed in This Session:   identify and use 3 strategies to improve organization  identify thoughts, triggers, and  stressors which contribute to feelings of anxiety  use cognitive strategies identified in therapy to challenge anxious thoughts  Identify negative self-talk and behaviors: challenge core beliefs, myths, and actions  Improve concentration, focus, and mindfulness in daily activities   identify two areas of life that you would like to have improved functioning  identify at least 3 adaptive coping statements to counteract negative thoughts     Intervention:   CBT: reviewed how environmental/situational factors impact his MH    Motivational Interviewing    MI Intervention: Co-Developed Goal: updated trx plan, Expressed Empathy/Understanding, Supported Autonomy, Collaboration, Evocation, Open-ended questions, Reflections: simple and complex, Change talk (evoked), and Reframe     Change Talk Expressed by the Patient: Ability to change Reasons to change Need to change Committment to change Activation Taking steps    Provider Response to Change Talk: E - Evoked more info from patient about behavior change, A - Affirmed patient's thoughts, decisions, or attempts at behavior change, R - Reflected patient's change talk, and S - Summarized patient's change talk " statements    Psychodynamic: Processed through internal experiences related to his current symptoms and how things used to be with MH  Solution Focused: Identified immediate areas of concern to address and discussed potential barriers to success    Assessments completed prior to visit:  PHQ2:       12/1/2023     9:03 AM 8/22/2023    12:48 PM 4/28/2023     9:24 AM 1/23/2023    10:23 AM 3/3/2022     8:56 AM 1/24/2022    11:45 AM   PHQ-2 ( 1999 [employer])   Q1: Little interest or pleasure in doing things 0 0 0 0 0 0   Q2: Feeling down, depressed or hopeless 0 0 0 0 0 0   PHQ-2 Score 0 0 0 0 0 0   Q1: Little interest or pleasure in doing things Not at all Not at all  Not at all    Not at all Not at all Not at all   Q2: Feeling down, depressed or hopeless Not at all Not at all  Not at all    Not at all Not at all Not at all   PHQ-2 Score 0 0  0    0 0 0     GAD2:       8/22/2023    12:56 PM 12/1/2023     9:03 AM   LUAN-2   Feeling nervous, anxious, or on edge 2 0   Not being able to stop or control worrying 1 0   LUAN-2 Total Score 3 0     GAD7:       8/22/2023    12:56 PM   LUAN-7 SCORE   Total Score 7 (mild anxiety)   Total Score 7     [place] Suicide Severity Rating Scale (Lifetime/Recent)      8/22/2023     1:39 PM   [place] Suicide Severity Rating (Lifetime/Recent)   Q1 Wish to be Dead (Lifetime) N   Q2 Non-Specific Active Suicidal Thoughts (Lifetime) N   Actual Attempt (Lifetime) N   Has subject engaged in non-suicidal self-injurious behavior? (Lifetime) N   Interrupted Attempts (Lifetime) N   Aborted or Self-Interrupted Attempt (Lifetime) N   Preparatory Acts or Behavior (Lifetime) N   Calculated C-SSRS Risk Score (Lifetime/Recent) No Risk Indicated         ASSESSMENT: Current Emotional / Mental Status (status of significant symptoms):   Risk status (Self / Other harm or suicidal ideation)   Patient denies current fears or concerns for personal safety.   Patient denies current or recent suicidal ideation or  behaviors.   Patient denies current or recent homicidal ideation or behaviors.   Patient denies current or recent self injurious behavior or ideation.   Patient denies other safety concerns.   Patient reports there has been no change in risk factors since their last session.     Patient reports there has been no change in protective factors since their last session.     Recommended that patient call 911 or go to the local ED should there be a change in any of these risk factors.     Appearance:   Appropriate    Eye Contact:   Good    Psychomotor Behavior: Normal    Attitude:   Cooperative  Interested Friendly Pleasant   Orientation:   All   Speech    Rate / Production: Normal/ Responsive Talkative    Volume:  Normal    Mood:    Anxious  Normal   Affect:    Appropriate    Thought Content:  Clear    Thought Form:  Coherent  Logical    Insight:    Good  and Intellectual Insight     Medication Review:   No current psychiatric medications prescribed     Medication Compliance:   NA     Changes in Health Issues:   None reported     Chemical Use Review:   Substance Use: Chemical use reviewed, no active concerns identified      Tobacco Use: No current tobacco use.      Diagnosis:  1. Generalized anxiety disorder    2. Attention deficit hyperactivity disorder (ADHD), other type        Collateral Reports Completed:   Not Applicable    PLAN: (Patient Tasks / Therapist Tasks / Other)  Follow-up on ADHD meds with community psychiatry  Follow-up on setting up dermatology appointment   Keep up with the fly tying  Make budget with girlfriend (extra credit)  Return to gym, 3 days a week (or six times total)    Yeyo Isbell [facility]                                                         ______________________________________________________________________    Individual Treatment Plan    Patient's Name: David HSIHE Mario  YOB: 1995    Date of Creation: 9/22/23  Date Treatment Plan Last Reviewed/Revised: 1/12/24    DSM5  Diagnoses: Attention-Deficit/Hyperactivity Disorder  314.01 (F90.9) Unspecified Attention -Deficit / Hyperactivity Disorder or 300.02 (F41.1) Generalized Anxiety Disorder  Psychosocial / Contextual Factors: Heterosexual, CIS male, , father of several young children, employed full time, hx of depression, and marital stressors  PROMIS (reviewed every 90 days):   PROMIS-10 Scores        8/22/2023    12:56 PM 12/15/2023     9:04 AM 1/12/2024     7:52 AM   PROMIS-10 Total Score w/o Sub Scores   PROMIS TOTAL - SUBSCORES 30 30 31       Referral / Collaboration:  Referral to another professional/service is not indicated at this time..    Anticipated number of session for this episode of care: 6-9 sessions  Anticipation frequency of session: Biweekly  Anticipated Duration of each session: 38-52 minutes  Treatment plan will be reviewed in 90 days or when goals have been changed.     Measurable Treatment Goal(s) related to diagnosis / functional impairment(s)  Goal 1: Patient will improve his ability to focus/concentrate and be better at not procrastinating to reduce his anxiety around deadlines.    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      Objective #A (Patient Action)    Patient will identify and use 3 strategies to improve organization  identify thoughts, triggers and  stressors which contribute to feelings of anxiety  use cognitive strategies identified in therapy to challenge anxious thoughts  learn at least 3 self-regulation strategies.  Status: Continued - Date(s): 1/12/24     Intervention(s)  Therapist will assign homework related to target objective with the intent to promote pt autonomy and better manage MH.  Facilitate increase in self-awareness  Provide psycho-education on organization and routine building  Teach/promote utilization of habit forming skills and coping skills    Objective #B Reduce avoidance and  perfectionism  Patient will identify 3 strategies to more effectively address stressors  identify patterns of escalation  (i.e. tightness in chest, flushed face, increased heart rate, clenched hands, etc.)  identify at least 3 techniques for intervening on the escalation  Identify negative self-talk and behaviors: challenge core beliefs, myths, and actions  Improve concentration, focus, and mindfulness in daily activities   identify two areas of life that you would like to have improved functioning  identify at least 3 adaptive coping statements to counteract negative thoughts.  Status: Completed - Date: 1/12/24      Intervention(s)  Therapist will assign homework related to target objective with the intent to promote pt autonomy and better manage MH.  Facilitate increase in self-awareness  Provide psycho-education on anxiety-avoidance cycle  Teach/promote utilization of behavior modification    Goal 2: Patient will get more involved in his community or activities that foster feelings of authenticity by living his values or encouraging engagement in things that are important to him.    I will know I've met my goal when I feel like I am putting my money where my mouth is.      Objective #A (Patient Action)    Status: Continued - Date(s): 1/12/24     Patient will identify and compliment positives at least 3 times daily to support positive self-image  participate in community activities to improve mood  attend and participate in social or recreational activities consistently once per week  practice one mindfulness skill each day for 5-10 minutes  use positive self-affirmations daily  identify at least 3 self-care activities.    Intervention(s)  Therapist will assign homework related to target objective with the intent to promote pt autonomy and better manage MH.  Facilitate increase in self-awareness  Provide psycho-education on positive self-talk and internal motivation development  Teach/promote utilization of  narrative therapy techniques     Patient has reviewed and agreed to the above plan.      Yeyo Isbell, [facility]  September 22, 2023

## 2024-01-26 ENCOUNTER — VIRTUAL VISIT (OUTPATIENT)
Dept: PSYCHOLOGY | Facility: CLINIC | Age: 29
End: 2024-01-26
Payer: COMMERCIAL

## 2024-01-26 DIAGNOSIS — F90.8 ATTENTION DEFICIT HYPERACTIVITY DISORDER (ADHD), OTHER TYPE: ICD-10-CM

## 2024-01-26 DIAGNOSIS — F41.1 GENERALIZED ANXIETY DISORDER: Primary | ICD-10-CM

## 2024-01-26 PROCEDURE — 90837 PSYTX W PT 60 MINUTES: CPT | Mod: 95 | Performed by: COUNSELOR

## 2024-01-26 NOTE — PROGRESS NOTES
[facility]                                     Progress Note    Patient Name: David Martin  Date: 1/26/24         Service Type: Individual      Session Start Time: 9:05 am  Session End Time: 9:58 pm     Session Length: 53 minutes    Session #: 8    Attendees: Client attended alone    Service Modality:  Video Visit:      Provider verified identity through the following two step process.  Patient provided:  Patient is known previously to provider    Telemedicine Visit: The patient's condition can be safely assessed and treated via synchronous audio and visual telemedicine encounter.      Reason for Telemedicine Visit: Services only offered telehealth    Originating Site (Patient Location): Patient's home    Distant Site (Provider Location): Provider Remote Setting- Home Office    Consent:  The patient/guardian has verbally consented to: the potential risks and benefits of telemedicine (video visit) versus in person care; bill my insurance or make self-payment for services provided; and responsibility for payment of non-covered services.     Patient would like the video invitation sent by:  My Chart    Mode of Communication:  [facility]    Distant Location (Provider):  Off-site    As the provider I attest to compliance with applicable laws and regulations related to telemedicine.    DATA  Interactive Complexity: No  Crisis: No  Extended Session (53+ minutes): PROLONGED SERVICE IN THE OUTPATIENT SETTING REQUIRING DIRECT (FACE-TO-FACE) PATIENT CONTACT BEYOND THE USUAL SERVICE:    - Patient's presenting concerns require more intensive intervention than could be completed within the usual service (Psycho-ed on CBTI protocols and habit forming skills/tools)        Progress Since Last Session (Related to Symptoms / Goals / Homework):   Symptoms: Worsening pt endorses some backsliding on the progress he's made    Homework: Partially completed      Episode of Care Goals: Satisfactory  progress - ACTION (Actively working towards change); Intervened by reinforcing change plan / affirming steps taken     Current / Ongoing Stressors and Concerns:   Pt is currently seeking therapy due to ongoing ADHD-like symptoms (related to processing delay), and procrastination at work which results in anxiety around deadlines. Since last session, pt feels that things have continued to improve overall and yet has found that using all his tools to manage his ADHD consistently has been tough. Pt went out volunteering to do some river bank restoration which was fun and felt good. Pt did notice that he has started backsliding a bit on the habits he was building, especially when he tried to bring more of his tools into his non-work areas of his life. Pt reflected how there has been change at work recently, which may be a contributing factor, and yet overall he is feeling like things are still better. Pt noted that afternoon after lunch has been the most challenging time to get back into his work and discussed strategies to address this. Pt also noted that when he tried to apply some of these habits to his home-life he has found it to be really challenging. Pt processed through how things have been going with home stuff. He is doing much better with not feeling anxious about work outside of work and his sleep routine has improved overall, though identified some backsliding. Pt reflected that the change in structure at work may have bled into how motivated he is to implement structure at home. Pt discussed strategies to address this and expressed that realizing this may have been very helpful in finding internal motivation. Pt has been engaging in his fishing hobby by working on his flies, which he has found to be incredibly helpful in improving his mood and giving him a creative outlet he was looking for. Session went longer then anticipated due to the amount of thing pt needed to process through.     Treatment  Objective(s) Addressed in This Session:   identify and use 3 strategies to improve organization  identify thoughts, triggers, and  stressors which contribute to feelings of anxiety  use cognitive strategies identified in therapy to challenge anxious thoughts  Identify negative self-talk and behaviors: challenge core beliefs, myths, and actions  Improve concentration, focus, and mindfulness in daily activities   identify two areas of life that you would like to have improved functioning  identify at least 3 adaptive coping statements to counteract negative thoughts     Intervention:   CBT: reviewed how environmental/situational factors impact his MH    Motivational Interviewing    MI Intervention: Expressed Empathy/Understanding, Supported Autonomy, Collaboration, Evocation, Open-ended questions, Reflections: simple and complex, Change talk (evoked), and Reframe     Change Talk Expressed by the Patient: Ability to change Reasons to change Need to change Committment to change Activation Taking steps    Provider Response to Change Talk: E - Evoked more info from patient about behavior change, A - Affirmed patient's thoughts, decisions, or attempts at behavior change, R - Reflected patient's change talk, and S - Summarized patient's change talk statements    Psychodynamic: Processed through internal experiences related to his current symptoms and how things used to be with MH  Solution Focused: Identified immediate areas of concern to address and discussed potential barriers to success    Assessments completed prior to visit:  PHQ2:       12/1/2023     9:03 AM 8/22/2023    12:48 PM 4/28/2023     9:24 AM 1/23/2023    10:23 AM 3/3/2022     8:56 AM 1/24/2022    11:45 AM   PHQ-2 ( 1999 [employer])   Q1: Little interest or pleasure in doing things 0 0 0 0 0 0   Q2: Feeling down, depressed or hopeless 0 0 0 0 0 0   PHQ-2 Score 0 0 0 0 0 0   Q1: Little interest or pleasure in doing things Not at all Not at all  Not at all    Not  at all Not at all Not at all   Q2: Feeling down, depressed or hopeless Not at all Not at all  Not at all    Not at all Not at all Not at all   PHQ-2 Score 0 0  0    0 0 0     GAD2:       8/22/2023    12:56 PM 12/1/2023     9:03 AM   LUAN-2   Feeling nervous, anxious, or on edge 2 0   Not being able to stop or control worrying 1 0   LUAN-2 Total Score 3 0     GAD7:       8/22/2023    12:56 PM   LUAN-7 SCORE   Total Score 7 (mild anxiety)   Total Score 7     Branch Suicide Severity Rating Scale (Lifetime/Recent)      8/22/2023     1:39 PM   Branch Suicide Severity Rating (Lifetime/Recent)   Q1 Wish to be Dead (Lifetime) N   Q2 Non-Specific Active Suicidal Thoughts (Lifetime) N   Actual Attempt (Lifetime) N   Has subject engaged in non-suicidal self-injurious behavior? (Lifetime) N   Interrupted Attempts (Lifetime) N   Aborted or Self-Interrupted Attempt (Lifetime) N   Preparatory Acts or Behavior (Lifetime) N   Calculated C-SSRS Risk Score (Lifetime/Recent) No Risk Indicated         ASSESSMENT: Current Emotional / Mental Status (status of significant symptoms):   Risk status (Self / Other harm or suicidal ideation)   Patient denies current fears or concerns for personal safety.   Patient denies current or recent suicidal ideation or behaviors.   Patient denies current or recent homicidal ideation or behaviors.   Patient denies current or recent self injurious behavior or ideation.   Patient denies other safety concerns.   Patient reports there has been no change in risk factors since their last session.     Patient reports there has been no change in protective factors since their last session.     Recommended that patient call 911 or go to the local ED should there be a change in any of these risk factors.     Appearance:   Appropriate    Eye Contact:   Good    Psychomotor Behavior: Normal    Attitude:   Cooperative  Interested Friendly Pleasant   Orientation:   All   Speech    Rate / Production: Normal/ Responsive  Talkative    Volume:  Normal    Mood:    Anxious  Normal   Affect:    Appropriate    Thought Content:  Clear    Thought Form:  Coherent  Logical    Insight:    Good  and Intellectual Insight     Medication Review:   No current psychiatric medications prescribed     Medication Compliance:   NA     Changes in Health Issues:   None reported     Chemical Use Review:   Substance Use: Chemical use reviewed, no active concerns identified      Tobacco Use: No current tobacco use.      Diagnosis:  1. Generalized anxiety disorder    2. Attention deficit hyperactivity disorder (ADHD), other type        Collateral Reports Completed:   Not Applicable    PLAN: (Patient Tasks / Therapist Tasks / Other)  Follow-up on ADHD meds with community psychiatry  Follow-up on setting up dermatology appointment   Make budget with girlfriend (extra credit)  Set timers at night  Work on pairing afternoon work  mode with tea or something to exchange with phone    MYNOR Mayer                                                         ______________________________________________________________________    Individual Treatment Plan    Patient's Name: David Martin  YOB: 1995    Date of Creation: 9/22/23  Date Treatment Plan Last Reviewed/Revised: 1/12/24    DSM5 Diagnoses: Attention-Deficit/Hyperactivity Disorder  314.01 (F90.9) Unspecified Attention -Deficit / Hyperactivity Disorder or 300.02 (F41.1) Generalized Anxiety Disorder  Psychosocial / Contextual Factors: Heterosexual, CIS male, , father of several young children, employed full time, hx of depression, and marital stressors  PROMIS (reviewed every 90 days):   PROMIS-10 Scores        12/15/2023     9:04 AM 1/12/2024     7:52 AM 1/25/2024     8:19 AM   PROMIS-10 Total Score w/o Sub Scores   PROMIS TOTAL - SUBSCORES 30 31 32       Referral / Collaboration:  Referral to another professional/service is not indicated at this time..    Anticipated number of  session for this episode of care: 6-9 sessions  Anticipation frequency of session: Biweekly  Anticipated Duration of each session: 38-52 minutes  Treatment plan will be reviewed in 90 days or when goals have been changed.     Measurable Treatment Goal(s) related to diagnosis / functional impairment(s)  Goal 1: Patient will improve his ability to focus/concentrate and be better at not procrastinating to reduce his anxiety around deadlines.    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      Objective #A (Patient Action)    Patient will identify and use 3 strategies to improve organization  identify thoughts, triggers and  stressors which contribute to feelings of anxiety  use cognitive strategies identified in therapy to challenge anxious thoughts  learn at least 3 self-regulation strategies.  Status: Continued - Date(s): 1/12/24     Intervention(s)  Therapist will assign homework related to target objective with the intent to promote pt autonomy and better manage MH.  Facilitate increase in self-awareness  Provide psycho-education on organization and routine building  Teach/promote utilization of habit forming skills and coping skills    Objective #B Reduce avoidance and perfectionism  Patient will identify 3 strategies to more effectively address stressors  identify patterns of escalation  (i.e. tightness in chest, flushed face, increased heart rate, clenched hands, etc.)  identify at least 3 techniques for intervening on the escalation  Identify negative self-talk and behaviors: challenge core beliefs, myths, and actions  Improve concentration, focus, and mindfulness in daily activities   identify two areas of life that you would like to have improved functioning  identify at least 3 adaptive coping statements to counteract negative thoughts.  Status: Completed - Date: 1/12/24      Intervention(s)  Therapist will assign  homework related to target objective with the intent to promote pt autonomy and better manage MH.  Facilitate increase in self-awareness  Provide psycho-education on anxiety-avoidance cycle  Teach/promote utilization of behavior modification    Goal 2: Patient will get more involved in his community or activities that foster feelings of authenticity by living his values or encouraging engagement in things that are important to him.    I will know I've met my goal when I feel like I am putting my money where my mouth is.      Objective #A (Patient Action)    Status: Continued - Date(s): 1/12/24     Patient will identify and compliment positives at least 3 times daily to support positive self-image  participate in community activities to improve mood  attend and participate in social or recreational activities consistently once per week  practice one mindfulness skill each day for 5-10 minutes  use positive self-affirmations daily  identify at least 3 self-care activities.    Intervention(s)  Therapist will assign homework related to target objective with the intent to promote pt autonomy and better manage MH.  Facilitate increase in self-awareness  Provide psycho-education on positive self-talk and internal motivation development  Teach/promote utilization of narrative therapy techniques     Patient has reviewed and agreed to the above plan.      Yeyo Isbell, GILBERTC  September 22, 2023

## 2024-02-09 ENCOUNTER — VIRTUAL VISIT (OUTPATIENT)
Dept: PSYCHOLOGY | Facility: CLINIC | Age: 29
End: 2024-02-09
Payer: COMMERCIAL

## 2024-02-09 DIAGNOSIS — F41.1 GENERALIZED ANXIETY DISORDER: Primary | ICD-10-CM

## 2024-02-09 DIAGNOSIS — F90.8 ATTENTION DEFICIT HYPERACTIVITY DISORDER (ADHD), OTHER TYPE: ICD-10-CM

## 2024-02-09 PROCEDURE — 90834 PSYTX W PT 45 MINUTES: CPT | Mod: 95 | Performed by: COUNSELOR

## 2024-02-09 NOTE — PROGRESS NOTES
[facility]                                     Progress Note    Patient Name: David Martin  Date: 2/09/24         Service Type: Individual      Session Start Time: 9:00 am  Session End Time: 9:45 pm     Session Length: 45 minutes    Session #: 9    Attendees: Client attended alone    Service Modality:  Video Visit:      Provider verified identity through the following two step process.  Patient provided:  Patient is known previously to provider    Telemedicine Visit: The patient's condition can be safely assessed and treated via synchronous audio and visual telemedicine encounter.      Reason for Telemedicine Visit: Services only offered telehealth    Originating Site (Patient Location): Patient's home    Distant Site (Provider Location): Provider Remote Setting- Home Office    Consent:  The patient/guardian has verbally consented to: the potential risks and benefits of telemedicine (video visit) versus in person care; bill my insurance or make self-payment for services provided; and responsibility for payment of non-covered services.     Patient would like the video invitation sent by:  My Chart    Mode of Communication:  Video Conference via Amwell    Distant Location (Provider):  Off-site    As the provider I attest to compliance with applicable laws and regulations related to telemedicine.    DATA  Interactive Complexity: No  Crisis: No  Extended Session (53+ minutes): No (Psycho-ed on CBTI protocols and habit forming skills/tools)        Progress Since Last Session (Related to Symptoms / Goals / Homework):   Symptoms: Improving pt is feeling more confident and less baseline anxiety around work    Homework: Achieved / completed to satisfaction      Episode of Care Goals: Satisfactory progress - ACTION (Actively working towards change); Intervened by reinforcing change plan / affirming steps taken     Current / Ongoing Stressors and Concerns:   Pt is currently seeking therapy due to  ongoing ADHD-like symptoms (related to processing delay), and procrastination at work which results in anxiety around deadlines. Since last session, pt did manage to get the ball rolling on his medical appointments he wanted to make. Most of his time has been focused on planning for his upcoming proposal to his girlfriend, which is supposed to happen tomorrow. Pt has also been working on planning out a [place] trip with a friend for this summer pro-actively. Pt overall has felt very productive in his personal life which has felt really good. Pt processed through how making a budget with his girlfriend went, which pt found initially anxiety provoking and yet once he got into it, it went really well. Pt reflected that getting that done has helped him feel more confident in where they are at. Pt continue to use the tools that have been discussed in therapy and was surprised to find that if he doesn't have his phone nearby he doesn't use his computer to access the same distractions, which was his original expectation. Pt was able to identify that his PC/Desktop is primarily only used for work, so he has trained himself to only associate it with that so the temptation to be distracted just isn't there. Pt again noticed that with work being more busy he has had a better time with staying focused as well. Pt's sleep routine continues to improve, though he is finding that being consistent with going to bed at night has been harder then the other changes he has been able to implement. Pt verbalized that he has been doing better with getting to bed by 11pm and is able to identify what barriers are getting in the way that he needs to address. Pt verbalized that his baseline anxiety has gotten much better and when his work day is done he is able to put it down without thinking about the rest of the evening. Pt has noticed the one area that he is still finding challenges is working out. Pt reaffirmed that working out or  running/exercise is really good for his mental health. Pt was able to identify that this past week he has had some big things to focus on other then exercise and yet overall he is finding it hard to get the motivation. Pt is able to get himself to do it 1-2 times week, but it feels very sporadic, and he wants to get it into a more consistent routine. Pt discussed strategies and self-imposed barriers that he can address that could set him up for success, including working it into his work day routine.      Treatment Objective(s) Addressed in This Session:   identify and use 3 strategies to improve organization  identify thoughts, triggers, and  stressors which contribute to feelings of anxiety  use cognitive strategies identified in therapy to challenge anxious thoughts  Identify negative self-talk and behaviors: challenge core beliefs, myths, and actions  Improve concentration, focus, and mindfulness in daily activities   identify two areas of life that you would like to have improved functioning  identify at least 3 adaptive coping statements to counteract negative thoughts     Intervention:   CBT: reviewed how routine building has been going with what has and hasn't been helping    Motivational Interviewing    MI Intervention: Expressed Empathy/Understanding, Supported Autonomy, Collaboration, Evocation, Open-ended questions, Reflections: simple and complex, Change talk (evoked), and Reframe     Change Talk Expressed by the Patient: Desire to change Ability to change Reasons to change Need to change Committment to change Activation Taking steps    Provider Response to Change Talk: E - Evoked more info from patient about behavior change, A - Affirmed patient's thoughts, decisions, or attempts at behavior change, R - Reflected patient's change talk, and S - Summarized patient's change talk statements    Psychodynamic: Processed through internal experiences related to how his anxiety feelings have changed since  starting therapy  Solution Focused: Identify self-imposed barriers and possible strategies that he can use to overcome them    Assessments completed prior to visit:  PHQ2:       12/1/2023     9:03 AM 8/22/2023    12:48 PM 4/28/2023     9:24 AM 1/23/2023    10:23 AM 3/3/2022     8:56 AM 1/24/2022    11:45 AM   PHQ-2 ( 1999 [employer])   Q1: Little interest or pleasure in doing things 0 0 0 0 0 0   Q2: Feeling down, depressed or hopeless 0 0 0 0 0 0   PHQ-2 Score 0 0 0 0 0 0   Q1: Little interest or pleasure in doing things Not at all Not at all  Not at all    Not at all Not at all Not at all   Q2: Feeling down, depressed or hopeless Not at all Not at all  Not at all    Not at all Not at all Not at all   PHQ-2 Score 0 0  0    0 0 0     GAD2:       8/22/2023    12:56 PM 12/1/2023     9:03 AM   LUAN-2   Feeling nervous, anxious, or on edge 2 0   Not being able to stop or control worrying 1 0   LUAN-2 Total Score 3 0     GAD7:       8/22/2023    12:56 PM   LUAN-7 SCORE   Total Score 7 (mild anxiety)   Total Score 7     [place] Suicide Severity Rating Scale (Lifetime/Recent)      8/22/2023     1:39 PM   [place] Suicide Severity Rating (Lifetime/Recent)   Q1 Wish to be Dead (Lifetime) N   Q2 Non-Specific Active Suicidal Thoughts (Lifetime) N   Actual Attempt (Lifetime) N   Has subject engaged in non-suicidal self-injurious behavior? (Lifetime) N   Interrupted Attempts (Lifetime) N   Aborted or Self-Interrupted Attempt (Lifetime) N   Preparatory Acts or Behavior (Lifetime) N   Calculated C-SSRS Risk Score (Lifetime/Recent) No Risk Indicated         ASSESSMENT: Current Emotional / Mental Status (status of significant symptoms):   Risk status (Self / Other harm or suicidal ideation)   Patient denies current fears or concerns for personal safety.   Patient denies current or recent suicidal ideation or behaviors.   Patient denies current or recent homicidal ideation or behaviors.   Patient denies current or recent self injurious  behavior or ideation.   Patient denies other safety concerns.   Patient reports there has been no change in risk factors since their last session.     Patient reports there has been no change in protective factors since their last session.     Recommended that patient call 911 or go to the local ED should there be a change in any of these risk factors.     Appearance:   Appropriate    Eye Contact:   Good    Psychomotor Behavior: Normal    Attitude:   Cooperative  Interested Friendly Pleasant   Orientation:   All   Speech    Rate / Production: Normal/ Responsive Talkative    Volume:  Normal    Mood:    Anxious  Normal   Affect:    Appropriate    Thought Content:  Clear    Thought Form:  Coherent  Logical    Insight:    Good  and Intellectual Insight     Medication Review:   No current psychiatric medications prescribed     Medication Compliance:   NA     Changes in Health Issues:   None reported     Chemical Use Review:   Substance Use: Chemical use reviewed, no active concerns identified      Tobacco Use: No current tobacco use.      Diagnosis:  1. Generalized anxiety disorder    2. Attention deficit hyperactivity disorder (ADHD), other type        Collateral Reports Completed:   Not Applicable    PLAN: (Patient Tasks / Therapist Tasks / Other)  Follow-up on ADHD meds with community psychiatry  Try out workout routine strategies reviewed (set it as a meeting)  Work out twice a week  Get to bed consistently by 11 by using tools discussed in therapy    MYNOR Mayer                                                         ______________________________________________________________________    Individual Treatment Plan    Patient's Name: David Martin  YOB: 1995    Date of Creation: 9/22/23  Date Treatment Plan Last Reviewed/Revised: 1/12/24    DSM5 Diagnoses: Attention-Deficit/Hyperactivity Disorder  314.01 (F90.9) Unspecified Attention -Deficit / Hyperactivity Disorder or 300.02 (F41.1)  Generalized Anxiety Disorder  Psychosocial / Contextual Factors: Heterosexual, CIS male, , father of several young children, employed full time, hx of depression, and marital stressors  PROMIS (reviewed every 90 days):   PROMIS-10 Scores        12/15/2023     9:04 AM 1/12/2024     7:52 AM 1/25/2024     8:19 AM   PROMIS-10 Total Score w/o Sub Scores   PROMIS TOTAL - SUBSCORES 30 31 32       Referral / Collaboration:  Referral to another professional/service is not indicated at this time..    Anticipated number of session for this episode of care: 6-9 sessions  Anticipation frequency of session: Biweekly  Anticipated Duration of each session: 38-52 minutes  Treatment plan will be reviewed in 90 days or when goals have been changed.     Measurable Treatment Goal(s) related to diagnosis / functional impairment(s)  Goal 1: Patient will improve his ability to focus/concentrate and be better at not procrastinating to reduce his anxiety around deadlines.    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      Objective #A (Patient Action)    Patient will identify and use 3 strategies to improve organization  identify thoughts, triggers and  stressors which contribute to feelings of anxiety  use cognitive strategies identified in therapy to challenge anxious thoughts  learn at least 3 self-regulation strategies.  Status: Continued - Date(s): 1/12/24     Intervention(s)  Therapist will assign homework related to target objective with the intent to promote pt autonomy and better manage MH.  Facilitate increase in self-awareness  Provide psycho-education on organization and routine building  Teach/promote utilization of habit forming skills and coping skills    Objective #B Reduce avoidance and perfectionism  Patient will identify 3 strategies to more effectively address stressors  identify patterns of escalation  (i.e. tightness in  chest, flushed face, increased heart rate, clenched hands, etc.)  identify at least 3 techniques for intervening on the escalation  Identify negative self-talk and behaviors: challenge core beliefs, myths, and actions  Improve concentration, focus, and mindfulness in daily activities   identify two areas of life that you would like to have improved functioning  identify at least 3 adaptive coping statements to counteract negative thoughts.  Status: Completed - Date: 1/12/24      Intervention(s)  Therapist will assign homework related to target objective with the intent to promote pt autonomy and better manage MH.  Facilitate increase in self-awareness  Provide psycho-education on anxiety-avoidance cycle  Teach/promote utilization of behavior modification    Goal 2: Patient will get more involved in his community or activities that foster feelings of authenticity by living his values or encouraging engagement in things that are important to him.    I will know I've met my goal when I feel like I am putting my money where my mouth is.      Objective #A (Patient Action)    Status: Continued - Date(s): 1/12/24     Patient will identify and compliment positives at least 3 times daily to support positive self-image  participate in community activities to improve mood  attend and participate in social or recreational activities consistently once per week  practice one mindfulness skill each day for 5-10 minutes  use positive self-affirmations daily  identify at least 3 self-care activities.    Intervention(s)  Therapist will assign homework related to target objective with the intent to promote pt autonomy and better manage MH.  Facilitate increase in self-awareness  Provide psycho-education on positive self-talk and internal motivation development  Teach/promote utilization of narrative therapy techniques     Patient has reviewed and agreed to the above plan.      Yeyo Isbell, [facility]  September 22, 2023

## 2024-03-03 ENCOUNTER — HEALTH MAINTENANCE LETTER (OUTPATIENT)
Age: 29
End: 2024-03-03

## 2024-03-06 ENCOUNTER — OFFICE VISIT (OUTPATIENT)
Dept: PHYSICAL MEDICINE AND REHAB | Facility: CLINIC | Age: 29
End: 2024-03-06
Payer: COMMERCIAL

## 2024-03-06 VITALS — DIASTOLIC BLOOD PRESSURE: 94 MMHG | OXYGEN SATURATION: 96 % | HEART RATE: 92 BPM | SYSTOLIC BLOOD PRESSURE: 195 MMHG

## 2024-03-06 DIAGNOSIS — M54.2 CERVICALGIA: Primary | ICD-10-CM

## 2024-03-06 DIAGNOSIS — M79.18 MYOFASCIAL PAIN: ICD-10-CM

## 2024-03-06 DIAGNOSIS — M79.601 PAIN OF RIGHT UPPER EXTREMITY: ICD-10-CM

## 2024-03-06 PROCEDURE — 99204 OFFICE O/P NEW MOD 45 MIN: CPT | Performed by: PAIN MEDICINE

## 2024-03-06 RX ORDER — GABAPENTIN 300 MG/1
900 CAPSULE ORAL 3 TIMES DAILY
Qty: 270 CAPSULE | Refills: 1 | Status: SHIPPED | OUTPATIENT
Start: 2024-03-06 | End: 2024-05-13

## 2024-03-06 ASSESSMENT — PAIN SCALES - GENERAL: PAINLEVEL: MILD PAIN (2)

## 2024-03-06 NOTE — LETTER
3/6/2024         RE: David Martin  [id number]  [place] [address]        Dear Colleague,    Thank you for referring your patient, David Martin, to the [facility]. Please see a copy of my visit note below.    ASSESSMENT: David Martin is a 28 year old male presents for consultation at the request of [facility] primary care providerMariana Sherman, with past medical history significant for asthma, obesity, chronic idiopathic gout, hyperlipidemia, hyperuricemia, elevated calcium, hypertension, disturbed concentration who presents today for new patient evaluation of neck pain and right upper extremity pain:     -Overall patient's physical exam is reassuring that he has normal strength and reflexes in his upper extremities.  His pain is likely secondary to right cervical radiculopathy.  Spurling's maneuver is positive on the right.    Patient is neurologically intact on exam. No myelopathic or red flag symptoms.      Oswestry (GREGORY) Questionnaire        3/6/2024    10:13 AM   OSWESTRY DISABILITY INDEX   Count 10   Sum 7   Oswestry Score (%) 14 %       Neck Disability Index:      3/6/2024    10:17 AM   Neck Disability Index (  Kasi H. and Delia C. 1991. All rights reserved.; used with permission)   SECTION 1 - PAIN INTENSITY 1   SECTION 2 - PERSONAL CARE 0   SECTION 3 - LIFTING 1   SECTION 4 - READING 0   SECTION 5 - HEADACHES 0   SECTION 6 - CONCENTRATION 2   SECTION 7 - WORK 3   SECTION 8 - DRIVING 1   SECTION 9 - SLEEPING 1   SECTION 10 - RECREATION 1   Count 10   Sum 10   Raw Score: /50 10   Neck Disability Index Score: (%) 20 %       Diagnoses and all orders for this visit:  Cervicalgia  -     XR Cervical Spine 2/3 Views; Future  -     gabapentin (NEURONTIN) 300 MG capsule; Take 3 capsules (900 mg) by mouth 3 times daily Start taking this medication at bedtime and then follow chart of how I want you to increase this.  -     Physical Therapy   Referral; Future  Pain of right upper extremity  -     XR Cervical Spine 2/3 Views; Future  -     gabapentin (NEURONTIN) 300 MG capsule; Take 3 capsules (900 mg) by mouth 3 times daily Start taking this medication at bedtime and then follow chart of how I want you to increase this.  -     Physical Therapy  Referral; Future  Myofascial pain  -     XR Cervical Spine 2/3 Views; Future  -     gabapentin (NEURONTIN) 300 MG capsule; Take 3 capsules (900 mg) by mouth 3 times daily Start taking this medication at bedtime and then follow chart of how I want you to increase this.  -     Physical Therapy  Referral; Future     PLAN:  Reviewed spine anatomy and disease process. Discussed diagnosis and treatment options with the patient today. A shared decision making model was used. The patient's values and choices were respected. The following represents what was discussed and decided upon by the provider and the patient.     -DIAGNOSTIC TESTS:   -- I ordered an x-ray of his neck.  Once I reviewed this I we will send a MyChart note with results and recommendations.  -- Should he continue to have neck and arm pain despite physical therapy and medications would recommend MRI of cervical spine.    -PHYSICAL THERAPY: I ordered physical therapy for the patient.  Discussed the importance of core strengthening, ROM, stretching exercises with the patient and how each of these entities is important in decreasing pain.  Explained to the patient that the purpose of physical therapy is to teach the patient a home exercise program.  These exercises need to be performed every day in order to decrease pain and prevent future occurrences of pain.  Likened it to brushing one's teeth.      -MEDICATIONS: I ordered gabapentin 300 mg at bedtime and have given him a chart of how to increase this until he is taking doses between 600-900 mg 3 times daily.  -  Discussed multiple medication options today with patient. Discussed risks,  side effects, and proper use of medications. Patient verbalized understanding.    -INTERVENTIONS: No interventions at this time.  Discussed risks and benefits of injections with patient today.    -PATIENT EDUCATION: We discussed pain management in a multiple to fashion including physical therapy, medication management, possible future injections.    -FOLLOW-UP:   Patient will follow-up in 4 weeks.    Advised patient to call the [facility] if symptoms worsen or you have problems controlling bladder and bowel function.   ______________________________________________________________________    SUBJECTIVE:   David Martin  is a 28 year old male who presents today for new patient evaluation of neck pain and right upper extremity pain.  Patient notes that about 3 weeks ago he woke up having neck pain and arm pain.  He has been taking Tylenol and ibuprofen which helps the neck pain, however not the arm pain.  Arm pain goes down to the anterior lateral and posterior lateral portion of his arm to his elbow and is shooting in nature.  If he extends his neck backwards he notices shooting of pain down to his elbow.  He notes that he has had trouble with his neck on the right side in the past and usually he will go to the chiropractor 1 time and this will resolve his pain.  This happens about every 6 months.  He notes this time has been to the chiropractor twice without relief and feels that this pain is different than it has been in the past.  He made an appointment after this.  He has not had physical therapy for this before.  He has had physical therapy for his left shoulder where he had a left AC joint repair at [facility] in 2019.  Pain today is 2/10 at its worst is 8/10 its best a 0/10.  He denies any bowel or bladder changes, fevers, chills, unintentional weight loss.    -Treatment to Date: Chiropractic care.    -Medications:    Current Outpatient Medications   Medication     allopurinol 200 MG TABS      fluticasone-salmeterol (ADVAIR DISKUS) 250-50 MCG/ACT inhaler     gabapentin (NEURONTIN) 300 MG capsule     indomethacin (INDOCIN) 25 MG capsule     VENTOLIN  (90 Base) MCG/ACT inhaler     No current facility-administered medications for this visit.       Allergies   Allergen Reactions     Minocycline Rash     GIVEN FOR ACNE WHEN A TEENAGER AND BROKE OUT IN [place]       Past Medical History:   Diagnosis Date     Chronic idiopathic gout involving toe without tophus     dx age 22 toe swleling,     Elevated blood pressure reading without diagnosis of hypertension 2022     Hypertension, unspecified type 2023     Moderate persistent asthma without complication     dx in middle school        Patient Active Problem List   Diagnosis     Class 2 severe obesity due to excess calories with serious comorbidity and body mass index (BMI) of 35.0 to 35.9 in adult (H)     Moderate persistent asthma without complication     Chronic idiopathic gout     Family history of gout     Hypertension, unspecified type     Dyslipidemia     Multiple pigmented nevi     Family history of skin cancer     Disturbed concentration     Hyperuricemia     Serum calcium elevated       Past Surgical History:   Procedure Laterality Date      CIRCUMCISION       SHOULDER SURGERY Left 2017    left shoulder AC grade 5 separation repair     WISDOM TOOTH EXTRACTION         Family History   Problem Relation Age of Onset     Thyroid Disease Mother      Gout Father      Aortic Valve Replacement Father      Hypertension Father      Hyperlipidemia Father      Asthma Father      Kidney Disease Maternal Grandmother         had one kidney removed du eto disease     Breast Cancer Maternal Aunt 40     Breast Cancer Other        Reviewed past medical, surgical, and family history with patient found on new patient intake packet located in EMR Media tab.     SOCIAL HX: He denies smoking or drinking alcohol.  He vaporized his cannabis from time to  time.    ROS: Specifically negative for bowel/bladder dysfunction, balance changes, headache, dizziness, foot drop, fevers, chills, appetite changes, nausea/vomiting, unexplained weight loss. Otherwise 13 systems reviewed are negative. Please see the patient's intake questionnaire from today for details.    OBJECTIVE:  BP (!) 195/94   Pulse 92   SpO2 96%     PHYSICAL EXAMINATION:  --CONSTITUTIONAL: Vital signs as above. No acute distress. The patient is well nourished and well groomed.  --PSYCHIATRIC: The patient is awake, alert, oriented to person, place, time and answering questions appropriately with clear speech. Appropriate mood and affect   --HEENT: Sclera are non-injected.  --SKIN: Skin over the face is clean, dry, intact without rashes.  --RESPIRATORY: Normal rhythm and effort. No abnormal accessory muscle breathing patterns noted.   --GROSS MOTOR: Easily arises from a seated position. Toe walking and heel walking are normal.    --CERVICAL SPINE: Inspection reveals no evidence of deformity. Range of motion is mildly limited in cervical flexion, extension, lateral rotation. No tenderness to palpation cervical spine.  Spurling's maneuver is positive on the right for reproduction of right upper extremity pain to the elbow.  --SHOULDERS: Full range of motion bilaterally. Negative empty can.  --UPPER EXTREMITY MOTOR TESTING:  Wrist flexion left 5/5, right 5/5  Wrist extension left 5/5, right 5/5  Pronators left 5/5, right 5/5  Biceps left 5/5, right 5/5   Triceps left 5/5, right 5/5   Shoulder abduction left 5/5, right 5/5   left 5/5, right 5/5  --NEUROLOGIC:  2/4 symmetric biceps, brachioradialis, triceps reflexes bilaterally. Sensation to upper extremities is normal.  Negative Boo's bilaterally.    --VASCULAR: 2/4 radial pulses bilaterally.         Again, thank you for allowing me to participate in the care of your patient.        Sincerely,        Rayshawn Nieto, DO   No

## 2024-03-06 NOTE — PROGRESS NOTES
ASSESSMENT: David Martin is a 28 year old male presents for consultation at the [facility] of [facility] primary [facility] providerMariana Sherman, with past medical history significant for asthma, obesity, chronic idiopathic gout, hyperlipidemia, hyperuricemia, elevated calcium, hypertension, disturbed concentration who presents today for new patient evaluation of neck pain and right upper extremity pain:     -Overall patient's physical exam is reassuring that he has normal strength and reflexes in his upper extremities.  His pain is likely secondary to right cervical radiculopathy.  Spurling's maneuver is positive on the right.    Patient is neurologically intact on exam. No myelopathic or red flag symptoms.      Oswestry (GREGORY) Questionnaire        3/6/2024    10:13 AM   OSWESTRY DISABILITY INDEX   Count 10   Sum 7   Oswestry Score (%) 14 %       Neck Disability Index:      3/6/2024    10:17 AM   Neck Disability Index (  Kasi LOPEZ. and Delia [employer]. 1991. All rights reserved.; used with permission)   SECTION 1 - PAIN INTENSITY 1   SECTION 2 - PERSONAL CARE 0   SECTION 3 - LIFTING 1   SECTION 4 - READING 0   SECTION 5 - HEADACHES 0   SECTION 6 - CONCENTRATION 2   SECTION 7 - WORK 3   SECTION 8 - DRIVING 1   SECTION 9 - SLEEPING 1   SECTION 10 - RECREATION 1   Count 10   Sum 10   Raw Score: /50 10   Neck Disability Index Score: (%) 20 %       Diagnoses and all orders for this visit:  Cervicalgia  -     XR Cervical Spine 2/3 Views; Future  -     gabapentin (NEURONTIN) 300 MG capsule; Take 3 capsules (900 mg) by mouth 3 times daily Start taking this medication at bedtime and then follow chart of how I want you to increase this.  -     Physical Therapy  Referral; Future  Pain of right upper extremity  -     XR Cervical Spine 2/3 Views; Future  -     gabapentin (NEURONTIN) 300 MG capsule; Take 3 capsules (900 mg) by mouth 3 times daily Start taking this medication at bedtime and then follow chart of how I want you to  increase this.  -     Physical Therapy  Referral; Future  Myofascial pain  -     XR Cervical Spine 2/3 Views; Future  -     gabapentin (NEURONTIN) 300 MG capsule; Take 3 capsules (900 mg) by mouth 3 times daily Start taking this medication at bedtime and then follow chart of how I want you to increase this.  -     Physical Therapy  Referral; Future     PLAN:  Reviewed spine anatomy and disease process. Discussed diagnosis and treatment options with the patient today. A shared decision making model was used. The patient's values and choices were respected. The following represents what was discussed and decided upon by the provider and the patient.     -DIAGNOSTIC TESTS:   -- I ordered an x-ray of his neck.  Once I reviewed this I we will send a MyChart note with results and recommendations.  -- Should he continue to have neck and arm pain despite physical therapy and medications would recommend MRI of cervical spine.    -PHYSICAL THERAPY: I ordered physical therapy for the patient.  Discussed the importance of core strengthening, ROM, stretching exercises with the patient and how each of these entities is important in decreasing pain.  Explained to the patient that the purpose of physical therapy is to teach the patient a home exercise program.  These exercises need to be performed every day in order to decrease pain and prevent future occurrences of pain.  Likened it to brushing one's teeth.      -MEDICATIONS: I ordered gabapentin 300 mg at bedtime and have given him a chart of how to increase this until he is taking doses between 600-900 mg 3 times daily.  -  Discussed multiple medication options today with patient. Discussed risks, side effects, and proper use of medications. Patient verbalized understanding.    -INTERVENTIONS: No interventions at this time.  Discussed risks and benefits of injections with patient today.    -PATIENT EDUCATION: We discussed pain management in a multiple to  fashion including physical therapy, medication management, possible future injections.    -FOLLOW-UP:   Patient will follow-up in 4 weeks.    Advised [facility] to [facility] if symptoms worsen or you have problems controlling bladder and bowel function.   ______________________________________________________________________    SUBJECTIVE:   David Martin  is a 28 year old male who presents today for new patient evaluation of neck pain and right upper extremity pain.  Patient notes that about 3 weeks ago he woke up having neck pain and arm pain.  He has been taking Tylenol and ibuprofen which helps the neck pain, however not the arm pain.  Arm pain goes down to the anterior lateral and posterior lateral portion of his arm to his elbow and is shooting in nature.  If he extends his neck backwards he notices shooting of pain down to his elbow.  He notes that he has had trouble with his neck on the right side in the past and usually he will go to the chiropractor 1 time and this will resolve his pain.  This happens about every 6 months.  He notes this time has been to the chiropractor twice without relief and feels that this pain is different than it has been in the past.  He made an appointment after this.  He has not had physical therapy for this before.  He has had physical therapy for his left shoulder where he had a left AC joint repair at [facility] in 2019.  Pain today is 2/10 at its worst is 8/10 its best a 0/10.  He denies any bowel or bladder changes, fevers, chills, unintentional weight loss.    -Treatment to Date: Chiropractic care.    -Medications:    Current Outpatient Medications   Medication    allopurinol 200 MG TABS    fluticasone-salmeterol (ADVAIR DISKUS) 250-50 MCG/ACT inhaler    gabapentin (NEURONTIN) 300 MG capsule    indomethacin (INDOCIN) 25 MG capsule    VENTOLIN  (90 Base) MCG/ACT inhaler     No current facility-administered medications for this visit.       Allergies    Allergen Reactions    Minocycline Rash     GIVEN FOR ACNE WHEN A TEENAGER AND BROKE OUT IN [place]       Past Medical History:   Diagnosis Date    Chronic idiopathic gout involving toe without tophus     dx age 22 toe swleling,    Elevated blood pressure reading without diagnosis of hypertension 2022    Hypertension, unspecified type 2023    Moderate persistent asthma without complication     dx in middle school        Patient Active Problem List   Diagnosis    Class 2 severe obesity due to excess calories with serious comorbidity and body mass index (BMI) of 35.0 to 35.9 in adult (H)    Moderate persistent asthma without complication    Chronic idiopathic gout    Family history of gout    Hypertension, unspecified type    Dyslipidemia    Multiple pigmented nevi    Family history of skin cancer    Disturbed concentration    Hyperuricemia    Serum calcium elevated       Past Surgical History:   Procedure Laterality Date     CIRCUMCISION      SHOULDER SURGERY Left 2017    left shoulder AC grade 5 separation repair    WISDOM TOOTH EXTRACTION         Family History   Problem Relation Age of Onset    Thyroid Disease Mother     Gout Father     Aortic Valve Replacement Father     Hypertension Father     Hyperlipidemia Father     Asthma Father     Kidney Disease Maternal Grandmother         had one kidney removed du eto disease    Breast Cancer Maternal Aunt 40    Breast Cancer Other        Reviewed past medical, surgical, and family history with patient found on new patient intake packet located in EMR Media tab.     SOCIAL HX: He denies smoking or drinking alcohol.  He vaporized his cannabis from time to time.    ROS: Specifically negative for bowel/bladder dysfunction, balance changes, headache, dizziness, foot drop, fevers, chills, appetite changes, nausea/vomiting, unexplained weight loss. Otherwise 13 systems reviewed are negative. Please see the patient's intake questionnaire from today for  details.    OBJECTIVE:  BP (!) 195/94   Pulse 92   SpO2 96%     PHYSICAL EXAMINATION:  --CONSTITUTIONAL: Vital signs as above. No acute distress. The patient is well nourished and well groomed.  --PSYCHIATRIC: The patient is awake, alert, oriented to person, place, time and answering questions appropriately with clear speech. Appropriate mood and affect   --HEENT: Sclera are non-injected.  --SKIN: Skin over the face is clean, dry, intact without rashes.  --RESPIRATORY: Normal rhythm and effort. No abnormal accessory muscle breathing patterns noted.   --GROSS MOTOR: Easily arises from a seated position. Toe walking and heel walking are normal.    --CERVICAL SPINE: Inspection reveals no evidence of deformity. Range of motion is mildly limited in cervical flexion, extension, lateral rotation. No tenderness to palpation cervical spine.  Spurling's maneuver is positive on the right for reproduction of right upper extremity pain to the elbow.  --SHOULDERS: Full range of motion bilaterally. Negative empty can.  --UPPER EXTREMITY MOTOR TESTING:  Wrist flexion left 5/5, right 5/5  Wrist extension left 5/5, right 5/5  Pronators left 5/5, right 5/5  Biceps left 5/5, right 5/5   Triceps left 5/5, right 5/5   Shoulder abduction left 5/5, right 5/5   left 5/5, right 5/5  --NEUROLOGIC:  2/4 symmetric biceps, brachioradialis, triceps reflexes bilaterally. Sensation to upper extremities is normal.  Negative Boo's bilaterally.    --VASCULAR: 2/4 radial pulses bilaterally.

## 2024-03-06 NOTE — PATIENT INSTRUCTIONS
Prescribed Gabapentin today, 300 mg tablets, to be titrated up to 3 tablets 3 times a day as tolerated for your nerve pain. Please follow Gabapentin dosing chart below.    Gabapentin 300mg Dosing Chart    DATE  MORNING AFTERNOON BEDTIME    Day 1 0 0 1    Day 2 0 0 1    Day 3 0 0 1    Day 4 1 0 1    Day 5 1 0 1    Day 6 1 0 1    Day 7 1 1 1    Day 8 1 1 1    Day 9 1 1 1    Day 10 1 1 2    Day 11 1 1 2    Day 12 1 1 2    Day 13 2 1 2    Day 14 2 1 2    Day 15 2 1 2    Day 16 2 2 2    Day 17 2 2 2    Day 18 2 2 2    Day 19 2 2 3    Day 20 2 2 3    Day 21 2 2 3    Day 22 3 2 3    Day 23 3 2 3    Day 24 3 2 3    Day 25 3 3 3    Day 26 3 3 3    Day 27 3 3 3     Continue medication, taking 3 capsules three times daily    Discussed the importance of core strengthening, ROM, stretching exercises with the patient and how each of these entities is important in decreasing pain.  Explained to the patient that the purpose of physical therapy is to teach the patient a home exercise program.  These exercises need to be performed every day in order to decrease pain and prevent future occurrences of pain.        I ordered an x-ray of your neck.  Once I reviewed this I will send you a MyChart note with results and recommendations.    ~Please call Nurse Navigation line [phone] with any questions or concerns about your treatment plan, if symptoms worsen and you would like to be seen urgently, or if you have problems controlling bladder and bowel function.         fall less than 6 months

## 2024-03-08 ENCOUNTER — VIRTUAL VISIT (OUTPATIENT)
Dept: PSYCHOLOGY | Facility: CLINIC | Age: 29
End: 2024-03-08
Payer: COMMERCIAL

## 2024-03-08 DIAGNOSIS — F90.8 ATTENTION DEFICIT HYPERACTIVITY DISORDER (ADHD), OTHER TYPE: ICD-10-CM

## 2024-03-08 DIAGNOSIS — F41.1 GENERALIZED ANXIETY DISORDER: Primary | ICD-10-CM

## 2024-03-08 PROCEDURE — 90837 PSYTX W PT 60 MINUTES: CPT | Mod: 95 | Performed by: COUNSELOR

## 2024-03-08 NOTE — PROGRESS NOTES
[facility]                                     Progress Note    Patient Name: David Martin  Date: 3/08/24         Service Type: Individual      Session Start Time: 9:00 am  Session End Time: 9:56 pm     Session Length: 56 minutes    Session #: 10    Attendees: Client attended alone    Service Modality:  Video Visit:      Provider verified identity through the following two step process.  Patient provided:  Patient is known previously to provider    Telemedicine Visit: The patient's condition can be safely assessed and treated via synchronous audio and visual telemedicine encounter.      Reason for Telemedicine Visit: Services only offered telehealth    Originating Site (Patient Location): Patient's home    Distant Site (Provider Location): Provider Remote Setting- Home Office    Consent:  The patient/guardian has verbally consented to: the potential risks and benefits of telemedicine (video visit) versus in person care; bill my insurance or make self-payment for services provided; and responsibility for payment of non-covered services.     Patient would like the video invitation sent by:  My Chart    Mode of [facility]:  [facility]    Distant Location (Provider):  Off-site    As the provider I attest to compliance with applicable laws and regulations related to telemedicine.    DATA  Interactive Complexity: No  Crisis: No  Extended Session (53+ minutes): PROLONGED SERVICE IN THE OUTPATIENT SETTING REQUIRING DIRECT (FACE-TO-FACE) PATIENT CONTACT BEYOND THE USUAL SERVICE:    - Longer session due to limited access to mental health appointments and necessity to address patient's distress / complexity    - Patient's presenting concerns require more intensive intervention than could be completed within the usual service         Progress Since Last Session (Related to Symptoms / Goals / Homework):   Symptoms: Improving pt is feeling more confident and less baseline anxiety  around work    Homework: Achieved / completed to satisfaction      Episode of Care Goals: Satisfactory progress - ACTION (Actively working towards change); Intervened by reinforcing change plan / affirming steps taken     Current / Ongoing Stressors and Concerns:   Pt is currently seeking therapy due to ongoing ADHD-like symptoms (related to processing delay), and procrastination at work which results in anxiety around deadlines. Since last session, pt reported that he has been dealing with some neck pain related to an old shoulder injury which has required more medical attention then regular. Pt learned he may have a herniated spinal disk and dealing with this has caused disruption to his routine. Additionally, pt has been dealing with nerve pain from this which has also been disrupting his work flow and yet he is starting to find being forced to take short 10 minute breaks has actually been helping him feel more invigorated throughout his workday. Pt does think that he has been able to continue using the tools that have been discussed in therapy and is finding that he has been more successful in putting his phone away while at work. Pt's work load has been about the same, but pt endorses feeling less anxious and thinks this is due to feeling more confident in his skills which he identified as a big positive change as well. Overall pt thinks his work stress and anxiety is being managed much better at this time. While pt's evening routine has been some what disrupted by his shoulder pain, his morning routine has stayed on track which he is very happy about. Pt found that showering first thing in the morning as really helped him get up and out of bed in the morning. Pt thinks that using the reward trade off tool has been incredibly effective for him. Pt sleep was initially disrupted by the shoulder pain, but now that has improved and is no longer a factor, so his sleep has been sufficient despite the routine  disruptions. Due to pt's shoulder pain his exercise routine has decreased, but he has been trying to focus on low-impact and stretching exercises so he can try to keep it up. Pt reflected that his mindset shifting from framing exercise as a tool for weight loss to a tool for feeling better mentally and physically has been really helpful to maintain motivation. Pt did propose to his partner, which went really well, and he is feeling good about that. Pt has been able to go fishing due the warm weather and is thankful that he can still engage in that despite his shoulder stuff. Pt's mom is visiting as well which has been a positive change. Pt has also been starting to schedule/plan his summer with his family and friends, which he is finding to be another big coping skill for him to have something to look forward to. Session took longer then anticipated due to presenting concern and lack of recent visits.      Treatment Objective(s) Addressed in This Session:   identify and use 3 strategies to improve organization  identify thoughts, triggers, and  stressors which contribute to feelings of anxiety  use cognitive strategies identified in therapy to challenge anxious thoughts  Identify negative self-talk and behaviors: challenge core beliefs, myths, and actions  Improve concentration, focus, and mindfulness in daily activities   identify two areas of life that you would like to have improved functioning  identify at least 3 adaptive coping statements to counteract negative thoughts     Intervention:   CBT: reviewed how routine building has been going with what has and hasn't been helping    Motivational Interviewing    MI Intervention: Expressed Empathy/Understanding, Supported Autonomy, Collaboration, Evocation, Open-ended questions, Reflections: simple and complex, Change talk (evoked), and Reframe     Change Talk Expressed by the Patient: Desire to change Ability to change Reasons to change Need to change Committment  to change Activation Taking steps    Provider Response to Change Talk: E - Evoked more info from patient about behavior change, A - Affirmed patient's thoughts, decisions, or attempts at behavior change, R - Reflected patient's change talk, and S - Summarized patient's change talk statements    Psychodynamic: Processed through internal experiences related to how his anxiety feelings have changed since starting therapy  Solution Focused: Identify self-imposed barriers and possible strategies that he can use to overcome them    Assessments completed prior to visit:  PHQ2:       3/8/2024     9:00 AM 12/1/2023     9:03 AM 8/22/2023    12:48 PM 4/28/2023     9:24 AM 1/23/2023    10:23 AM 3/3/2022     8:56 AM 1/24/2022    11:45 AM   PHQ-2 ( 1999 [employer])   Q1: Little interest or pleasure in doing things 0 0 0 0 0 0 0   Q2: Feeling down, depressed or hopeless 0 0 0 0 0 0 0   PHQ-2 Score 0 0 0 0 0 0 0   Q1: Little interest or pleasure in doing things Not at all Not at all Not at all  Not at all    Not at all Not at all Not at all   Q2: Feeling down, depressed or hopeless Not at all Not at all Not at all  Not at all    Not at all Not at all Not at all   PHQ-2 Score 0 0 0  0    0 0 0     GAD2:       8/22/2023    12:56 PM 12/1/2023     9:03 AM 3/8/2024     9:00 AM   LUAN-2   Feeling nervous, anxious, or on edge 2 0 0   Not being able to stop or control worrying 1 0 0   LUAN-2 Total Score 3 0 0     GAD7:       8/22/2023    12:56 PM   LUAN-7 SCORE   Total Score 7 (mild anxiety)   Total Score 7     [place] Suicide Severity Rating Scale (Lifetime/Recent)      8/22/2023     1:39 PM   [place] Suicide Severity Rating (Lifetime/Recent)   Q1 Wish to be Dead (Lifetime) N   Q2 Non-Specific Active Suicidal Thoughts (Lifetime) N   Actual Attempt (Lifetime) N   Has subject engaged in non-suicidal self-injurious behavior? (Lifetime) N   Interrupted Attempts (Lifetime) N   Aborted or Self-Interrupted Attempt (Lifetime) N   Preparatory Acts or  Behavior (Lifetime) N   Calculated C-SSRS Risk Score (Lifetime/Recent) No Risk Indicated         ASSESSMENT: Current Emotional / Mental Status (status of significant symptoms):   Risk status (Self / Other harm or suicidal ideation)   Patient denies current fears or concerns for personal safety.   Patient denies current or recent suicidal ideation or behaviors.   Patient denies current or recent homicidal ideation or behaviors.   Patient denies current or recent self injurious behavior or ideation.   Patient denies other safety concerns.   Patient reports there has been no change in risk factors since their last session.     Patient reports there has been no change in protective factors since their last session.     Recommended that patient call 911 or go to the local ED should there be a change in any of these risk factors.     Appearance:   Appropriate    Eye Contact:   Good    Psychomotor Behavior: Normal    Attitude:   Cooperative  Interested Friendly Pleasant   Orientation:   All   Speech    Rate / Production: Normal/ Responsive Talkative    Volume:  Normal    Mood:    Anxious  Normal   Affect:    Appropriate    Thought Content:  Clear    Thought Form:  Coherent  Logical    Insight:    Good  and Intellectual Insight     Medication Review:   No current psychiatric medications prescribed     Medication Compliance:   NA     Changes in Health Issues:   Yes: Pain, No Psychological Distress     Chemical Use Review:   Substance Use: Chemical use reviewed, no active concerns identified      Tobacco Use: No current tobacco use.      Diagnosis:  1. Generalized anxiety disorder    2. Attention deficit hyperactivity disorder (ADHD), other type        Collateral Reports Completed:   Not Applicable    PLAN: (Patient Tasks / Therapist Tasks / Other)  Follow-up on ADHD meds with community psychiatry  Follow-up on medical stuff  Continue to use tools developed in therapy  Identify how/why spending makes you feel better, using  budgeting system to help    Yeyo Isbell [facility]                                                         ______________________________________________________________________    Individual Treatment Plan    Patient's Name: David Martin  YOB: 1995    Date of Creation: 9/22/23  Date Treatment Plan Last Reviewed/Revised: 1/12/24    DSM5 Diagnoses: Attention-Deficit/Hyperactivity Disorder  314.01 (F90.9) Unspecified Attention -Deficit / Hyperactivity Disorder or 300.02 (F41.1) Generalized Anxiety Disorder  Psychosocial / Contextual Factors: Heterosexual, CIS male, , father of several young children, employed full time, hx of depression, and marital stressors  PROMIS (reviewed every 90 days):   PROMIS-10 Scores        12/15/2023     9:04 AM 1/12/2024     7:52 AM 1/25/2024     8:19 AM   PROMIS-10 Total Score w/o Sub Scores   PROMIS TOTAL - SUBSCORES 30 31 32       Referral / Collaboration:  Referral to another professional/service is not indicated at this time..    Anticipated number of session for this episode of care: 6-9 sessions  Anticipation frequency of session: Biweekly  Anticipated Duration of each session: 38-52 minutes  Treatment plan will be reviewed in 90 days or when goals have been changed.     Measurable Treatment Goal(s) related to diagnosis / functional impairment(s)  Goal 1: Patient will improve his ability to focus/concentrate and be better at not procrastinating to reduce his anxiety around deadlines.    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      Objective #A (Patient Action)    Patient will identify and use 3 strategies to improve organization  identify thoughts, triggers and  stressors which contribute to feelings of anxiety  use cognitive strategies identified in therapy to challenge anxious thoughts  learn at least 3 self-regulation strategies.  Status: Continued - Date(s):  1/12/24     Intervention(s)  Therapist will assign homework related to target objective with the intent to promote pt autonomy and better manage MH.  Facilitate increase in self-awareness  Provide psycho-education on organization and routine building  Teach/promote utilization of habit forming skills and coping skills    Objective #B Reduce avoidance and perfectionism  Patient will identify 3 strategies to more effectively address stressors  identify patterns of escalation  (i.e. tightness in chest, flushed face, increased heart rate, clenched hands, etc.)  identify at least 3 techniques for intervening on the escalation  Identify negative self-talk and behaviors: challenge core beliefs, myths, and actions  Improve concentration, focus, and mindfulness in daily activities   identify two areas of life that you would like to have improved functioning  identify at least 3 adaptive coping statements to counteract negative thoughts.  Status: Completed - Date: 1/12/24      Intervention(s)  Therapist will assign homework related to target objective with the intent to promote pt autonomy and better manage MH.  Facilitate increase in self-awareness  Provide psycho-education on anxiety-avoidance cycle  Teach/promote utilization of behavior modification    Goal 2: Patient will get more involved in his community or activities that foster feelings of authenticity by living his values or encouraging engagement in things that are important to him.    I will know I've met my goal when I feel like I am putting my money where my mouth is.      Objective #A (Patient Action)    Status: Continued - Date(s): 1/12/24     Patient will identify and compliment positives at least 3 times daily to support positive self-image  participate in community activities to improve mood  attend and participate in social or recreational activities consistently once per week  practice one mindfulness skill each day for 5-10 minutes  use positive  self-affirmations daily  identify at least 3 self-care activities.    Intervention(s)  Therapist will assign homework related to target objective with the intent to promote pt autonomy and better manage MH.  Facilitate increase in self-awareness  Provide psycho-education on positive self-talk and internal motivation development  Teach/promote utilization of narrative therapy techniques     Patient has reviewed and agreed to the above plan.      Yeyo Isbell, [facility]  September 22, 2023

## 2024-03-12 ENCOUNTER — ANCILLARY PROCEDURE (OUTPATIENT)
Dept: GENERAL RADIOLOGY | Facility: CLINIC | Age: 29
End: 2024-03-12
Attending: PAIN MEDICINE
Payer: COMMERCIAL

## 2024-03-12 DIAGNOSIS — M54.2 CERVICALGIA: ICD-10-CM

## 2024-03-12 DIAGNOSIS — M79.601 PAIN OF RIGHT UPPER EXTREMITY: ICD-10-CM

## 2024-03-12 DIAGNOSIS — M79.18 MYOFASCIAL PAIN: ICD-10-CM

## 2024-03-12 PROCEDURE — 72040 X-RAY EXAM NECK SPINE 2-3 VW: CPT | Mod: GC | Performed by: RADIOLOGY

## 2024-03-18 ENCOUNTER — OFFICE VISIT (OUTPATIENT)
Dept: FAMILY MEDICINE | Facility: CLINIC | Age: 29
End: 2024-03-18
Payer: COMMERCIAL

## 2024-03-18 VITALS
DIASTOLIC BLOOD PRESSURE: 77 MMHG | HEIGHT: 74 IN | WEIGHT: 273 LBS | SYSTOLIC BLOOD PRESSURE: 148 MMHG | BODY MASS INDEX: 35.04 KG/M2 | OXYGEN SATURATION: 100 % | TEMPERATURE: 97.8 F | RESPIRATION RATE: 18 BRPM

## 2024-03-18 DIAGNOSIS — E79.0 HYPERURICEMIA: ICD-10-CM

## 2024-03-18 DIAGNOSIS — D22.9 MULTIPLE PIGMENTED NEVI: ICD-10-CM

## 2024-03-18 DIAGNOSIS — Z00.00 HEALTH CARE MAINTENANCE: ICD-10-CM

## 2024-03-18 DIAGNOSIS — I10 HYPERTENSION, UNSPECIFIED TYPE: ICD-10-CM

## 2024-03-18 DIAGNOSIS — R41.840 DISTURBED CONCENTRATION: ICD-10-CM

## 2024-03-18 DIAGNOSIS — E66.812 CLASS 2 SEVERE OBESITY DUE TO EXCESS CALORIES WITH SERIOUS COMORBIDITY AND BODY MASS INDEX (BMI) OF 35.0 TO 35.9 IN ADULT (H): ICD-10-CM

## 2024-03-18 DIAGNOSIS — E66.01 CLASS 2 SEVERE OBESITY DUE TO EXCESS CALORIES WITH SERIOUS COMORBIDITY AND BODY MASS INDEX (BMI) OF 35.0 TO 35.9 IN ADULT (H): ICD-10-CM

## 2024-03-18 DIAGNOSIS — M79.621 PAIN OF RIGHT UPPER ARM: ICD-10-CM

## 2024-03-18 DIAGNOSIS — J45.30 MILD PERSISTENT ASTHMA WITHOUT COMPLICATION: ICD-10-CM

## 2024-03-18 DIAGNOSIS — E83.52 SERUM CALCIUM ELEVATED: ICD-10-CM

## 2024-03-18 DIAGNOSIS — Z80.8 FAMILY HISTORY OF SKIN CANCER: ICD-10-CM

## 2024-03-18 DIAGNOSIS — Z01.84 IMMUNITY STATUS TESTING: ICD-10-CM

## 2024-03-18 DIAGNOSIS — Z71.89 ADVANCED DIRECTIVES, COUNSELING/DISCUSSION: ICD-10-CM

## 2024-03-18 DIAGNOSIS — M1A.00X0 CHRONIC IDIOPATHIC GOUT: ICD-10-CM

## 2024-03-18 DIAGNOSIS — E78.5 DYSLIPIDEMIA: ICD-10-CM

## 2024-03-18 DIAGNOSIS — Z82.69 FAMILY HISTORY OF GOUT: ICD-10-CM

## 2024-03-18 DIAGNOSIS — B35.4 TINEA CORPORIS: ICD-10-CM

## 2024-03-18 DIAGNOSIS — Z00.00 ROUTINE HISTORY AND PHYSICAL EXAMINATION OF ADULT: Primary | ICD-10-CM

## 2024-03-18 LAB
ALBUMIN SERPL BCG-MCNC: 4.9 G/DL (ref 3.5–5.2)
ALP SERPL-CCNC: 46 U/L (ref 40–150)
ALT SERPL W P-5'-P-CCNC: 29 U/L (ref 0–70)
ANION GAP SERPL CALCULATED.3IONS-SCNC: 11 MMOL/L (ref 7–15)
AST SERPL W P-5'-P-CCNC: 28 U/L (ref 0–45)
BASOPHILS # BLD AUTO: 0.1 10E3/UL (ref 0–0.2)
BASOPHILS NFR BLD AUTO: 1 %
BILIRUB SERPL-MCNC: 0.5 MG/DL
BUN SERPL-MCNC: 12.4 MG/DL (ref 6–20)
CALCIUM SERPL-MCNC: 10.2 MG/DL (ref 8.6–10)
CHLORIDE SERPL-SCNC: 103 MMOL/L (ref 98–107)
CHOLEST SERPL-MCNC: 171 MG/DL
CREAT SERPL-MCNC: 0.99 MG/DL (ref 0.67–1.17)
CREAT UR-MCNC: 99.4 MG/DL
DEPRECATED HCO3 PLAS-SCNC: 28 MMOL/L (ref 22–29)
EGFRCR SERPLBLD CKD-EPI 2021: >90 ML/MIN/1.73M2
EOSINOPHIL # BLD AUTO: 0.5 10E3/UL (ref 0–0.7)
EOSINOPHIL NFR BLD AUTO: 7 %
ERYTHROCYTE [DISTWIDTH] IN BLOOD BY AUTOMATED COUNT: 12.8 % (ref 10–15)
FASTING STATUS PATIENT QL REPORTED: NO
GLUCOSE SERPL-MCNC: 96 MG/DL (ref 70–99)
HBA1C MFR BLD: 5.4 % (ref 0–5.6)
HBV SURFACE AB SERPL IA-ACNC: 74 M[IU]/ML
HBV SURFACE AB SERPL IA-ACNC: REACTIVE M[IU]/ML
HBV SURFACE AG SERPL QL IA: NONREACTIVE
HCT VFR BLD AUTO: 42.1 % (ref 40–53)
HDLC SERPL-MCNC: 39 MG/DL
HGB BLD-MCNC: 13.8 G/DL (ref 13.3–17.7)
IMM GRANULOCYTES # BLD: 0 10E3/UL
IMM GRANULOCYTES NFR BLD: 1 %
LDLC SERPL CALC-MCNC: 90 MG/DL
LYMPHOCYTES # BLD AUTO: 2.1 10E3/UL (ref 0.8–5.3)
LYMPHOCYTES NFR BLD AUTO: 33 %
MCH RBC QN AUTO: 28.5 PG (ref 26.5–33)
MCHC RBC AUTO-ENTMCNC: 32.8 G/DL (ref 31.5–36.5)
MCV RBC AUTO: 87 FL (ref 78–100)
MICROALBUMIN UR-MCNC: <12 MG/L
MICROALBUMIN/CREAT UR: NORMAL MG/G{CREAT}
MONOCYTES # BLD AUTO: 0.5 10E3/UL (ref 0–1.3)
MONOCYTES NFR BLD AUTO: 8 %
NEUTROPHILS # BLD AUTO: 3.1 10E3/UL (ref 1.6–8.3)
NEUTROPHILS NFR BLD AUTO: 50 %
NONHDLC SERPL-MCNC: 132 MG/DL
PLATELET # BLD AUTO: 272 10E3/UL (ref 150–450)
POTASSIUM SERPL-SCNC: 4.4 MMOL/L (ref 3.4–5.3)
PROT SERPL-MCNC: 7.4 G/DL (ref 6.4–8.3)
RBC # BLD AUTO: 4.85 10E6/UL (ref 4.4–5.9)
SODIUM SERPL-SCNC: 142 MMOL/L (ref 135–145)
TRIGL SERPL-MCNC: 209 MG/DL
TSH SERPL DL<=0.005 MIU/L-ACNC: 2.5 UIU/ML (ref 0.3–4.2)
WBC # BLD AUTO: 6.2 10E3/UL (ref 4–11)

## 2024-03-18 PROCEDURE — 80061 LIPID PANEL: CPT | Performed by: FAMILY MEDICINE

## 2024-03-18 PROCEDURE — 85025 COMPLETE CBC W/AUTO DIFF WBC: CPT | Performed by: FAMILY MEDICINE

## 2024-03-18 PROCEDURE — 87340 HEPATITIS B SURFACE AG IA: CPT | Performed by: FAMILY MEDICINE

## 2024-03-18 PROCEDURE — 99214 OFFICE O/P EST MOD 30 MIN: CPT | Mod: 25 | Performed by: FAMILY MEDICINE

## 2024-03-18 PROCEDURE — 84550 ASSAY OF BLOOD/URIC ACID: CPT | Performed by: FAMILY MEDICINE

## 2024-03-18 PROCEDURE — 82043 UR ALBUMIN QUANTITATIVE: CPT | Performed by: FAMILY MEDICINE

## 2024-03-18 PROCEDURE — 82570 ASSAY OF URINE CREATININE: CPT | Performed by: FAMILY MEDICINE

## 2024-03-18 PROCEDURE — 99395 PREV VISIT EST AGE 18-39: CPT | Performed by: FAMILY MEDICINE

## 2024-03-18 PROCEDURE — 80053 COMPREHEN METABOLIC PANEL: CPT | Performed by: FAMILY MEDICINE

## 2024-03-18 PROCEDURE — 84443 ASSAY THYROID STIM HORMONE: CPT | Performed by: FAMILY MEDICINE

## 2024-03-18 PROCEDURE — 36415 COLL VENOUS BLD VENIPUNCTURE: CPT | Performed by: FAMILY MEDICINE

## 2024-03-18 PROCEDURE — 86706 HEP B SURFACE ANTIBODY: CPT | Performed by: FAMILY MEDICINE

## 2024-03-18 PROCEDURE — 83036 HEMOGLOBIN GLYCOSYLATED A1C: CPT | Performed by: FAMILY MEDICINE

## 2024-03-18 RX ORDER — ALLOPURINOL 200 MG/1
200 TABLET ORAL DAILY
Qty: 90 TABLET | Refills: 1 | Status: SHIPPED | OUTPATIENT
Start: 2024-03-18 | End: 2024-03-19 | Stop reason: DRUGHIGH

## 2024-03-18 RX ORDER — LISINOPRIL 10 MG/1
10 TABLET ORAL DAILY
Qty: 30 TABLET | Refills: 1 | Status: SHIPPED | OUTPATIENT
Start: 2024-03-18 | End: 2024-05-24

## 2024-03-18 RX ORDER — FLUTICASONE PROPIONATE AND SALMETEROL 250; 50 UG/1; UG/1
1 POWDER RESPIRATORY (INHALATION) EVERY 12 HOURS
Qty: 1 EACH | Refills: 3 | Status: SHIPPED | OUTPATIENT
Start: 2024-03-18 | End: 2024-08-19

## 2024-03-18 SDOH — HEALTH STABILITY: PHYSICAL HEALTH: ON AVERAGE, HOW MANY DAYS PER WEEK DO YOU ENGAGE IN MODERATE TO STRENUOUS EXERCISE (LIKE A BRISK WALK)?: 3 DAYS

## 2024-03-18 SDOH — HEALTH STABILITY: PHYSICAL HEALTH: ON AVERAGE, HOW MANY MINUTES DO YOU ENGAGE IN EXERCISE AT THIS LEVEL?: 30 MIN

## 2024-03-18 ASSESSMENT — ASTHMA QUESTIONNAIRES
QUESTION_1 LAST FOUR WEEKS HOW MUCH OF THE TIME DID YOUR ASTHMA KEEP YOU FROM GETTING AS MUCH DONE AT WORK, SCHOOL OR AT HOME: NONE OF THE TIME
ACT_TOTALSCORE: 24
QUESTION_2 LAST FOUR WEEKS HOW OFTEN HAVE YOU HAD SHORTNESS OF BREATH: NOT AT ALL
QUESTION_5 LAST FOUR WEEKS HOW WOULD YOU RATE YOUR ASTHMA CONTROL: COMPLETELY CONTROLLED
ACT_TOTALSCORE: 24
QUESTION_4 LAST FOUR WEEKS HOW OFTEN HAVE YOU USED YOUR RESCUE INHALER OR NEBULIZER MEDICATION (SUCH AS ALBUTEROL): ONCE A WEEK OR LESS
QUESTION_3 LAST FOUR WEEKS HOW OFTEN DID YOUR ASTHMA SYMPTOMS (WHEEZING, COUGHING, SHORTNESS OF BREATH, CHEST TIGHTNESS OR PAIN) WAKE YOU UP AT NIGHT OR EARLIER THAN USUAL IN THE MORNING: NOT AT ALL

## 2024-03-18 ASSESSMENT — SOCIAL DETERMINANTS OF HEALTH (SDOH): HOW OFTEN DO YOU GET TOGETHER WITH FRIENDS OR RELATIVES?: TWICE A WEEK

## 2024-03-18 NOTE — RESULT ENCOUNTER NOTE
Hello -    Here are my comments about your recent results:  -Normal red blood cell (hgb) levels, normal white blood cell count and normal platelet levels.  -A1C (diabetic test) is normal and indicates that your blood sugar has been in a normal range the last 3 months.  For additional lab test information, labtestsonline.org is an excellent reference..    Please let us know if you have any questions or concerns.     Regards,  Mariana Sherman MD

## 2024-03-18 NOTE — LETTER
My Asthma Action Plan    Name: David Martin   YOB: 1995  Date: 3/20/2024   My doctor: Mariana Sherman MD   My clinic: [facility]        My Control Medicine: Fluticasone propionate + salmeterol (Advair Diskus or Wixela Inhub) -  250/50 mcg 1 puff twice a day  My Rescue Medicine:  to use advair as needed for rescue not to exceed total 12 puffs a day My Asthma Severity:   Mild Persistent  Know your asthma triggers: exercise or sports  None            GREEN ZONE   Good Control  I feel good  No cough or wheeze  Can work, sleep and play without asthma symptoms       Take your asthma control medicine every day.     If exercise triggers your asthma, take your rescue medication  15 minutes before exercise or sports, and  During exercise if you have asthma symptoms  Spacer to use with inhaler: If you have a spacer, make sure to use it with your inhaler             YELLOW ZONE Getting Worse  I have ANY of these:  I do not feel good  Cough or wheeze  Chest feels tight  Wake up at night   Keep taking your Green Zone medications  Start taking your rescue medicine:  every 20 minutes for up to 1 hour. Then every 4 hours for 24-48 hours.  If you stay in the Yellow Zone for more than 12-24 hours, contact your doctor.  If you do not return to the Green Zone in 12-24 hours or you get worse, start taking your oral steroid medicine if prescribed by your provider.           RED ZONE Medical Alert - Get Help  I have ANY of these:  I feel awful  Medicine is not helping  Breathing getting harder  Trouble walking or talking  Nose opens wide to breathe       Take your rescue medicine NOW  If your provider has prescribed an oral steroid medicine, start taking it NOW  Call your doctor NOW  If you are still in the Red Zone after 20 minutes and you have not reached your doctor:  Take your rescue medicine again and  Call 911 or go to the emergency room right away    See your regular doctor within 2 weeks  of an Emergency Room or Urgent Care visit for follow-up treatment.          Annual Reminders:  Meet with Asthma Educator,  Flu Shot in the Fall, consider Pneumonia Vaccination for patients with asthma (aged 19 and older).    Pharmacy:    [id number] - [place], [place] - [address] [facility] [address]  [id number] - [place], [place] - [address] AT [facility] AND [address]    Electronically signed by Mariana Sherman MD   Date: 03/20/24                      Asthma Triggers  How To Control Things That Make Your Asthma Worse    Triggers are things that make your asthma worse.  Look at the list below to help you find your triggers and what you can do about them.  You can help prevent asthma flare-ups by staying away from your triggers.      Trigger                                                          What you can do   Cigarette Smoke  Tobacco smoke can make asthma worse. Do not allow smoking in your home, car or around you.  Be sure no one smokes at a child s day care or school.  If you smoke, ask your health care provider for ways to help you quit.  Ask family members to quit too.  Ask your health care provider for a referral to Quit Plan to help you quit smoking, or call 7-850-539-PLAN.     Colds, Flu, Bronchitis  These are common triggers of asthma. Wash your hands often.  Don t touch your eyes, nose or mouth.  Get a flu shot every year.     Dust Mites  These are tiny bugs that live in cloth or carpet. They are too small to see. Wash sheets and blankets in hot water every week.   Encase pillows and mattress in dust mite proof covers.  Avoid having carpet if you can. If you have carpet, vacuum weekly.   Use a dust mask and HEPA vacuum.   Pollen and Outdoor Mold  Some people are allergic to trees, grass, or weed pollen, or molds. Try to keep your windows closed.  Limit time out doors when pollen count is high.   Ask you health care provider about  taking medicine during allergy season.     Animal Dander  Some people are allergic to skin flakes, urine or saliva from pets with fur or feathers. Keep pets with fur or feathers out of your home.    If you can t keep the pet outdoors, then keep the pet out of your bedroom.  Keep the bedroom door closed.  Keep pets off cloth furniture and away from stuffed toys.     Mice, Rats, and Cockroaches   Some people are allergic to the waste from these pests.   Cover food and garbage.  Clean up spills and food crumbs.  Store grease in the refrigerator.   Keep food out of the bedroom.   Indoor Mold  This can be a trigger if your home has high moisture. Fix leaking faucets, pipes, or other sources of water.   Clean moldy surfaces.  Dehumidify basement if it is damp and smelly.   Smoke, Strong Odors, and Sprays  These can reduce air quality. Stay away from strong odors and sprays, such as perfume, powder, hair spray, paints, smoke incense, paint, cleaning products, candles and new carpet.   Exercise or Sports  Some people with asthma have this trigger. Be active!  Ask your doctor about taking medicine before sports or exercise to prevent symptoms.    Warm up for 5-10 minutes before and after sports or exercise.     Other Triggers of Asthma  Cold air:  Cover your nose and mouth with a scarf.  Sometimes laughing or crying can be a trigger.  Some medicines and food can trigger asthma.

## 2024-03-18 NOTE — PROGRESS NOTES
The below note was dictated using voice recognition. Although reviewed after completion, some word and grammatical error may remain .       Preventive Care Visit  [facility]  Mariana Sherman MD, Family Medicine  Mar 18, 2024      Assessment & Plan     Routine history and physical examination of adult  Here for a physical and follow up     Since seen in nov seen by PMDR recently for pain in right arm. Hx of prior left shoulder pain and prior ac separation and surgery. Ever since then if wakes up occasionally with a crick in neck that goes away by seeing the chiro. Most recent episode of crick in neck chiro didn't help and pain was going down right arm to elbow.  Pain noted worse if sitting or standing typing at desk only. Able to fish with no symptoms. Seen by spine doctor early march . Xray done showed normal cervical spine alignment. Mild loss of disc height at C5-6. Scattered mild degenerative changes including facet arthropathy, uncinate hypertrophy, and anterior endplate osteophytosis. No prevertebral edema. No mass in the visualized lung apices. Was given gabapentin 300 mg told to titrate up to tid. Takes at bedtime but since makes him feel loopy will only taken a another one at lunchtime if having a painful morning at work and if at home to get through the afternoon. Encouraged to start PT ordered & return to PMDR after that as planned.    Asthma currently mild persistent well controlled, ACT =24 , on Advair 250/50 prescribed 1 puff twice a day, but using 2 puffs once a day, reports no gaps in use though med not filled in epic in a while. Does note girlfriend also has Advair so may be sharing. No side effects. Not used albuterol in a while. Has held off on lung function test and pulmonary referral for now. Continues vaping marijuana. Encouraged to avoid vaping and change Advair to 1 puff twice a day and may use controlled inhaler as well for rescue no more than 12 total puffs in 24  hrs. Med refilled & [facility]     Hypertension on no meds. blood pressure 148/77. Not on any stimulants.  Encouraged a low-salt low caffeine low alcohol in diet, increase regular physical activity and work to lose 10% of total body weight.  Advised to avoid NSAID's as much as possible. FH of HTN. Alcohol 3 to 4 / week. Agreeable to meds. Discussed options. Start lisinopril 10 mg  daily, change posture slowly till get used to lower BP. Stay well hydrated, monitor for a dry hacky cough. Nurse apt with bmp in 3 weeks for BP check   Apt with me end of may for BP   Later labs showed  normal Kidney function, Microalbumin, Sodium & Potassium.    BMI 35 with HTN & dyslipidemia, no known snoring. To continue to work on diet, exercise low carb intake smaller portions,  more plant-based and some weight loss. Feels set back to exercise due to neck and arm. Agreeable to referral to wt specialist. Labs later showed  normal glucose, HBA1c & TSH.     Dyslipidemia: elevated triglycerides and low HDL, will check labs today. Later labs showed low HDL& elevated TG. & normal LFT's. Encouraged exercising 150 minutes of aerobic exercise per week (30 minutes 5 days per week or 50 minutes 3 days per week are options), and omega-3 fatty acids (fish oil) 4623-4716 mg daily are help to improve this.. Weight loss will help.     Chronic idiopathic gout and hyperuricemia asymptomatic on allopurinol 200 mg daily.  not taken Indocin > 1 yr & avoiding other NSAID's as much as possible as it can raise blood pressure. We will recheck uric acid today and go from there, encouraged to continue to avoid red meat as much as possible and limit alcohol intake. Med not filled in a while but he reports no gaps in med. Later uric acid elevated and allopurinol increased to 300 mg daily & will recheck uric acid at follow up    Elevated calcium noted in April 2023 and again in June 2023 though ionized calcium at that time was normal.   Recheck calcium along with ionized calcium, vitamin D and parathyroid in nov 2023 had been normal. Calcium came back elevated again today but prior work up did not show a concerning cause,  will recheck this at next apt .Had a normal CBC.     Noted history of trouble concentrating in the past reported no depression or anxiety.  Referred for diagnostic eval and after several months did get an evaluation at [facility] in 2022, records were not available but reported was told had a neurocognitive processing disorder related to processing speed. Had had no trouble taking tests in the past.  Instead procrastination was the bigger issue. Referred to a community psychiatry to further evaluate and treat. No apt with psyche made. Did start therapy for skills in 8/2023. Has been doing therapy for the ADHD like symptoms & feels its been going well. He got rid of external distractions and was still finding tough time concentrating.  Mind will go to daydreaming when trying to do code.  Was advised by therapist to talk about meds and will call [facility] to make an appointment with psychiatrist there to discuss medications   reports playing phone tag with them and encouraged he call [facility] where had diagnostic assessment again about setting up an appointment with a psychiatrist to discuss medications.    Multiple moles and family history of skin cancer encouraged to schedule with dermatology. Has apt in aug     In nov felt the new patch noted in left groin area was most suggestive of a fungal infection like tinea corporis. Advised athlete's foot ointment over-the-counter twice a day to area up to 1 month after resolved and follow-up with Derm on this. He only applied it for a week, saw no change and stopped. No itching. Advised needs to give med longer time to work and will retry.      reviewed  No ACP on file and declines infor  Labs today and will make further recommendations once reviewed  Vaccines reviewed & UTD.   In nov given  a Hepatitis booster as not immune despite prior vaccination and will check titers today to see if now has adequate immunity.  Later labs noted negative for Hep B infection and now immune to hep B.  Nurse apt with bmp in 3 weeks for BP check   Apt with me end of may for BP   - Hepatitis B Surface Antibody; Future  - Hepatitis B surface antigen; Future  - Hepatitis B Surface Antibody  - Hepatitis B surface antigen    Pain of right upper arm  Since seen in nov seen by PMDR recently for pain in right arm. Hx of prior left shoulder pain and prior ac separation and surgery. Ever since then if wakes up occasionally with a crick in neck that goes away by seeing the chiro. Most recent episode of crick in neck chiro didn't help and pain was going down right arm to elbow.  Pain noted worse if sitting or standing typing at desk only. Able to fish with no symptoms. Seen by spine doctor early march . Xray done showed normal cervical spine alignment. Mild loss of disc height at C5-6. Scattered mild degenerative changes including facet arthropathy, uncinate hypertrophy, and anterior endplate osteophytosis. No prevertebral edema. No mass in the visualized lung apices. Was given gabapentin 300 mg told to titrate up to tid. Takes at bedtime but since makes him feel loopy will only taken a another one at lunchtime if having a painful morning at work and if at home to get through the afternoon. Encouraged to start PT ordered & return to PMDR after that as planned.    Mild persistent asthma without complication  Asthma currently mild persistent well controlled, ACT =24 , on Advair 250/50 prescribed 1 puff twice a day, but using 2 puffs once a day, reports no gaps in use though med not filled in epic in a while. Does note girlfriend also has Advair so may be sharing. No side effects. Not used albuterol in a while. Has held off on lung function test and pulmonary referral for now. Continues vaping marijuana. Encouraged to avoid vaping and  change Advair to 1 puff twice a day and may use controlled inhaler as well for rescue no more than 12 total puffs in 24 hrs. Med refilled & Asthma action plan placed in mychart  - fluticasone-salmeterol (ADVAIR DISKUS) 250-50 MCG/ACT inhaler; Inhale 1 puff into the lungs every 12 hours  - CBC with Platelets & Differential; Future  - CBC with Platelets & Differential    Hypertension, unspecified type  Hypertension on no meds. blood pressure 148/77. Not on any stimulants.  Encouraged a low-salt low caffeine low alcohol in diet, increase regular physical activity and work to lose 10% of total body weight.  Advised to avoid NSAID's as much as possible. FH of HTN. Alcohol 3 to 4 / week. Agreeable to meds. Discussed options. Start lisinopril 10 mg  daily, change posture slowly till get used to lower BP. Stay well hydrated, monitor for a dry hacky cough. Nurse apt with bmp in 3 weeks for BP check   Apt with me end of may for BP   Later labs showed  normal Kidney function, Microalbumin, Sodium & Potassium.  - lisinopril (ZESTRIL) 10 MG tablet; Take 1 tablet (10 mg) by mouth daily  - Adult Comprehensive Weight Management  Referral; Future  - Comprehensive metabolic panel; Future  - TSH with free T4 reflex; Future  - Lipid panel reflex to direct LDL Non-fasting; Future  - Albumin Random Urine Quantitative with Creat Ratio; Future  - Comprehensive metabolic panel  - TSH with free T4 reflex  - Lipid panel reflex to direct LDL Non-fasting  - Albumin Random Urine Quantitative with Creat Ratio    Class 2 severe obesity due to excess calories with serious comorbidity and body mass index (BMI) of 35.0 to 35.9 in adult (H)  BMI 35 with HTN & dyslipidemia, no known snoring. To continue to work on diet, exercise low carb intake smaller portions,  more plant-based and some weight loss. Feels set back to exercise due to neck and arm. Agreeable to referral to wt specialist. Labs later showed  normal glucose, HBA1c & TSH.   -  Adult Comprehensive Weight Management  Referral; Future  - Comprehensive metabolic panel; Future  - TSH with free T4 reflex; Future  - Lipid panel reflex to direct LDL Non-fasting; Future  - Hemoglobin A1c; Future  - Comprehensive metabolic panel  - TSH with free T4 reflex  - Lipid panel reflex to direct LDL Non-fasting  - Hemoglobin A1c    Dyslipidemia  Dyslipidemia: elevated triglycerides and low HDL, will check labs today. Later labs showed low HDL& elevated TG. & normal LFT's. Encouraged exercising 150 minutes of aerobic exercise per week (30 minutes 5 days per week or 50 minutes 3 days per week are options), and omega-3 fatty acids (fish oil) 3556-2715 mg daily are help to improve this.. Weight loss will help.   - Adult Comprehensive Weight Management  Referral; Future  - TSH with free T4 reflex; Future  - Lipid panel reflex to direct LDL Non-fasting; Future  - TSH with free T4 reflex  - Lipid panel reflex to direct LDL Non-fasting    Chronic idiopathic gout  Hyperuricemia  Family history of gout  Chronic idiopathic gout and hyperuricemia reported asymptomatic on allopurinol 200 mg daily.  not taken Indocin > 1 yr & avoiding other NSAID's as much as possible as it can raise blood pressure. We will recheck uric acid today and go from there, encouraged to continue to avoid red meat as much as possible and limit alcohol intake. Med not filled in a while but he reports no gaps in med. Later uric acid elevated and allopurinol increased to 300 mg daily & will recheck uric acid at follow up  - Uric acid; Future  - Uric acid  - allopurinol (ZYLOPRIM) 300 MG tablet; Take 1 tablet (300 mg) by mouth daily    Serum calcium elevated  Elevated calcium noted in April 2023 and again in June 2023 though ionized calcium at that time was normal.  Recheck calcium along with ionized calcium, vitamin D and parathyroid in nov 2023 had been normal. Calcium came back elevated again today but prior work up did not show  a concerning cause,  will recheck this at next apt .Had a normal CBC.     Disturbed concentration  Noted history of trouble concentrating in the past reported no depression or anxiety.  Referred for diagnostic eval and after several months did get an evaluation at [facility] in 2022, records were not available but reported was told had a neurocognitive processing disorder related to processing speed. Had had no trouble taking tests in the past.  Instead procrastination was the bigger issue. Referred to a community psychiatry to further evaluate and treat. No apt with psyche made. Did start therapy for skills in 8/2023. Has been doing therapy for the ADHD like symptoms & feels its been going well. He got rid of external distractions and was still finding tough time concentrating.  Mind will go to daydreaming when trying to do code.  Was advised by therapist to talk about meds and will call [facility] to make an appointment with psychiatrist there to discuss medications   reports playing phone tag with them and encouraged he call [facility] where had diagnostic assessment again about setting up an appointment with a psychiatrist to discuss medications.    Multiple pigmented nevi  Family history of skin cancer  Multiple moles and family history of skin cancer encouraged to schedule with dermatology. Has apt in aug     Tinea corporis  In nov felt the new patch noted in left groin area was most suggestive of a fungal infection like tinea corporis. Advised athlete's foot ointment over-the-counter twice a day to area up to 1 month after resolved and follow-up with Derm on this. He only applied it for a week, saw no change and stopped. No itching. Advised needs to give med longer time to work and will retry.     Health care maintenance   reviewed  No ACP on file and declines infor  Labs today and will make further recommendations once reviewed  Vaccines reviewed & UTD.   In nov given a Hepatitis booster as not immune despite prior  vaccination and will check titers today to see if now has adequate immunity.  Later labs noted negative for Hep B infection and now immune to hep B.  Nurse apt with bmp in 3 weeks for BP check   Apt with me end of may for BP   - REVIEW OF HEALTH MAINTENANCE PROTOCOL ORDERS    Advanced directives, counseling/discussion    Immunity status testing  In nov given a Hepatitis booster as not immune despite prior vaccination and will check titers today to see if now has adequate immunity.  Later labs noted negative for Hep B infection and now immune to hep B.  - Hepatitis B Surface Antibody; Future  - Hepatitis B surface antigen; Future  - Hepatitis B Surface Antibody  - Hepatitis B surface antigen    Patient has been advised of split billing requirements and indicates understanding: Yes    Review of the result(s) of each unique test - diagnostics in epic  Independent interpretation of a test performed by another physician/other qualified health care professional (not separately reported) - therapist, PMDR  Diagnosis or treatment significantly limited by social determinants of health - follow up issues  Ordering of each unique test  Prescription drug management  Beyond the 10 min spent on preventive I spent 20 min of total 30 min encounter on the date of the encounter doing chart review, history and exam, documentation and further activities per the note      Counseling  Appropriate preventive services were discussed with this patient, including applicable screening as appropriate for fall prevention, nutrition, physical activity, Tobacco-use cessation, weight loss and cognition.  Checklist reviewing preventive services available has been given to the patient.  Reviewed patient's diet, addressing concerns and/or questions.   He is at risk for lack of exercise and has been provided with information to increase physical activity for the benefit of his well-being.   He is at risk for psychosocial distress and has been provided  with information to reduce risk.       Work on weight loss  Regular exercise  See Patient Instructions        Eric Hernandez is a 28 year old, presenting for the following:  Physical        3/18/2024     2:19 PM   Additional Questions   Roomed by John        Health Care Directive  Patient does not have a Health Care Directive or Living Will: Discussed advance care planning with patient; however, patient declined at this time.    HPI      3/18/2024   General Health   How would you rate your overall physical health? Good   Feel stress (tense, anxious, or unable to sleep) Only a little   (!) STRESS CONCERN      3/18/2024   Nutrition   Three or more servings of calcium each day? Yes   Diet: Low salt   How many servings of fruit and vegetables per day? (!) 2-3   How many sweetened beverages each day? 0-1         3/18/2024   Exercise   Days per week of moderate/strenuous exercise 3 days   Average minutes spent exercising at this level 30 min         3/18/2024   Social Factors   Frequency of gathering with friends or relatives Twice a week   Worry food won't last until get money to buy more No   Food not last or not have enough money for food? No   Do you have housing?  Yes   Are you worried about losing your housing? No   Lack of transportation? No   Unable to get utilities (heat,electricity)? No         3/18/2024   Dental   Dentist two times every year? Yes         3/18/2024   TB Screening   Were you born outside of the [place]? No         Today's PHQ-2 Score:       3/18/2024    12:57 PM   PHQ-2 ( 1999 [employer])   Q1: Little interest or pleasure in doing things 0   Q2: Feeling down, depressed or hopeless 0   PHQ-2 Score 0   Q1: Little interest or pleasure in doing things Not at all   Q2: Feeling down, depressed or hopeless Not at all   PHQ-2 Score 0           3/18/2024   Substance Use   Alcohol more than 3/day or more than 7/wk No   Do you use any other substances recreationally? (!) CANNABIS PRODUCTS     Social History      Tobacco Use    Smoking status: Never    Smokeless tobacco: Never   Vaping Use    Vaping Use: Some days    Substances: THC   Substance Use Topics    Alcohol use: Yes     Comment: 3 ot 5 a week    Drug use: Yes     Types: Marijuana           3/18/2024   STI Screening   New sexual partner(s) since last STI/HIV test? No         3/18/2024   Contraception/Family Planning   Questions about contraception or family planning No        Reviewed and updated as needed this visit by Provider                    Here for a physical and follow up     Since seen in nov seen by PMDR recently for pain in right arm. Hx of prior left shoulder pain and prior ac separation and surgery. Ever since then if wakes up with a crick in neck doesnt go away unless sees the chiro. Most recent episode of crick in neck chiro didn't help and pain was going down right arm to elbow.  Pain noted worse if sitting or standing typing at desk only. Able to fish with no symptoms. Seen by spine doctor . Xray done showed normal cervical spine alignment. Mild loss of disc height at C5-6. Scattered mild degenerative changes including facet arthropathy, uncinate hypertrophy, and anterior endplate osteophytosis. No prevertebral edema. No mass in the visualized lung apices. Dx with cervical degenerative changes, most pronounced at C5-6. Was given gabapentin 300 mg told to titrate up to tid. Takes at bedtime but since makes him feel loopy will only taken a another one at lunchtime if having a painful morning at work and if at home to get through the afternoon.     Asthma currently well controlled, aCT =24 , on Advair 250/50 1 puff twice a day, using though 2 puffs once a day, reports no gaps in use thought not filled in epic. Girlfriend also has Advair so may be sharing. No side effects. Not used albuterol in a while. Has held off on lung function test and pulmonary referral for now. Continues vaping marijuana      Hypertension on no meds. blood pressure 148/77.  Not on any stimulants.  Encouraged a low-salt low caffeine low alcohol in diet, increase regular physical activity and work to lose 10% of total body weight.  advised to avoid NSAID's as much as possible. FH of HTN. Alcohol 3 to 4 / week. Agreeable to meds. Discussed options. To try lisinopril 10 mg and go from there.    BMI 35 with HTN & dyslipidemia elevated triglycerides and low HDL, no known snoring. To continue to work on diet exercise low carb intake smaller portions more plant-based and some weight loss. Feels set back to exercise due to neck and arm. Agreeable to referral to wt specialist.     Dyslipidemia will check labs today     Chronic idiopathic gout and hyperuricemia asymptomatic on allopurinol 200 mg daily.  Avoiding Indocin and other NSAID's as much as possible as it can raise blood pressure.  not used indocin in > 1 yr. We will recheck uric acid today and go from there, encouraged to continue to avoid red meat as much as possible and limit alcohol intake.med not filled in a while. Reports no gaps in med though.    Elevated calcium noted in April 2023 and again in June 2023 though ionized calcium at that time was normal.  Recheck calcium along with ionized calcium, vitamin D and parathyroid in nov had been normal.      Noted history of trouble concentrating in the past reported no depression or anxiety.  Referred for diagnostic eval and after several months did get an evaluation at [facility] in 2022, records were not available but reported was told had a neurocognitive processing disorder related to processing speed. Had had no trouble taking tests in the past.  Instead procrastination was the bigger issue. Referred to a community psychiatry to further evaluate and treat. No apt with psyche made. Did start therapy for skills in 8/2023. Has been doing therapy for the ADHD like symptoms & feels its been going well. He got rid of external distractions and was still finding tough time concentrating.  Mind  will go to [place] when trying to do code.  Was advised by therapist to talk about meds and will call [facility] to make an appointment with psychiatrist there to discuss medications   reports playing phone tag with them and encouraged he call [facility] where had diagnostic assessment again about setting up an appointment with a psychiatrist to discuss medications.    Multiple moles and family history of skin cancer encouraged to schedule with dermatology. Has apt in aug     In nov felt the new patch left groin area was most suggestive of a fungal infection like tinea corporis or jock itch.  Advised athlete's foot ointment over-the-counter twice a day to area up to 1 month after resolved and follow-up with Derm on this. He only applied it for a week, saw no change and stopped. No itching. Advised needs to give med longer time to work and will retry.      reviewed  No ACP on file and declines infor  Vaccines reviewed & UTD.   In nov given a Hepatitis booster as not immune despite prior vaccination and will check titers today to see if now has adequate immunity.    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  [place]  negative.  Moved to [place] in 2018, no prior records available, no regular care prior to that, Seen in urgent care  5/20/2019 for toe pain due to history of gout treated with prednisone, tested positive for COVID in July 2020 and November 2021.  Seen once in [place] on 12/6/2021 in urgent care for left toe pain and prior history of gout and given prednisone for 5 days.       Seen first time 1/24/22 to establish care and for preventive health and additional concerns. Advised self testicular check regularly. Labs done. Health care maintenance reviewed. Discussed working on healthcare directives.  Was given the Flu & pneumonia vaccines. He was to clarify when he last had Tdap & noted Covid vaccine x3 up-to-date. Asthma was uncontrolled as had run out of his controller meds 6 months prior, noting shortness of breath with activity limiting exercise, resumed generic Wixela  250/50, 1 puff twice a day & if not better can do lung function tests and refer to pulmonary. Noted elevated blood pressure could be white coat hypertension & or  due to weight.  Recommended a low-salt diet, limiting alcohol to 1 drink a day no more than 7 total a week, 10% of total body weight loss & increasing aerobic activity, encouraged to katrina a home blood pressure machine and checking it at different times different days and calling us if it was consistently more than 130 x 80.  If persistently elevated despite lifestyle changes then to consider medications. Discussed BMI, diet, exercise weight loss, avoiding marijuana as much as possible as this could increase weight gain over time. Reviewed his history of gout diagnosed clinically based on toe pain in the past and family history of gout in dad.  Recent joint pain had resolved.  Had made some recent lifestyle & dietary changes. Would avoid as needed prednisone as much as possible given long-term side effect (can worsen weight gain increases risk of diabetes, ulcers), may use indomethacin he has at home as needed.  Always try to use the lowest dose for the shortest duration of time as Indocin like other  NSAID's increased risk of GI bleeding and heart attack.  Other options could be colchicine prn.  Side effects would be nausea vomiting diarrhea etc.  We can also consider referral to rheumatologist if necessary. History of trouble focusing now affecting job: referred to a psychologist for diagnostic evaluation of ADHD.  Encouraged to avoid all marijuana prior to evaluation so they can get an unbiased evaluation.  Based on results could make recommendations for either skill base treatment and/or referral to a community psychiatrist for medication management.  Some medications like stimulants could make blood pressure worse so this might be tricky if his blood pressure remained elevated. Was to return in 1 month for blood pressure check, asthma follow-up etc.   Lab showed a normal cbc, TSH, HBA1c, Micro albumin, lipids other than elevated TG and uric acd was elevated at 8.1 & allopurinol started.     Seen  3/3/2022  & noted Asthma was better. ACT up from 13 to 17. Had only restarted generic Advair 1.5 weeks prior and expected to further improve with time. Continued on generic Advair 1 puff twice a day ( turned out with his new insurance was cheaper than Wixela previously given). Had been on Advair before with good control in the past. Encouraged not to use albuterol first thing in am but use the Advair instead then see if albuterol needed. Goal was to get to point where albuterol only used as rescue and one inhaler last 1 year preferably.  Encouraged to use albuterol prior to exercise if indicated. Lung function tests ordered. Felt no need for pulmonary referral yet. Encouraged to avoid Vaping/ inhaling cannabis to protect lungs. BP was better, & advised to continue with low salt, low alcohol diet, there was no indication to check an ultrasound or do labs to search for secondary causes of elevated BP at the time. BMI had improved, was to continue to work on diet, exercise and losing 10 % of total body weight over the  year and rechecking TG at next physical.  Gout noted stable on allopurinol 100 mg, uric acid level checked to see if dose needed adjustment, was to continue with a hypouricemic diet ( low red meat, low alcohol). Use indomethacin sparingly as could raise BP( not required in a while) & felt no indication currently to see a rheumatologist. Noted his diagnostic eval for decreased concentration was scheduled in May outside [place]. Tdap was  given & advised to check in virtually in 4 weeks or so for asthma check in. BMP came back normal and uric acid was down from 8.1 to 7.6 and continued on allopurinol 100 mg daily.     Seen virtually 4/8/22 noted asthma was doing better. Continued on Advair twice a day & refill sent in 1 yr, to Use albuterol as rescue. Had not scheduled PFT's yet. Felt no indication to see a pulmonologist. Gout was stable on allopurinol 100 mg daily. Uric acid improved from original. Goal was eventually keep it less than 6.5 to prevent attacks. Was to continue with a low uric acid level in diet ( diet / lifestyle measures). Was to get a diagnostic assessment for ADHD eval in may. Advised to return in 5 months for asthma follow up.   No follow up made. Treated for Covid in sept with paxlovid. Noted albuterol inhaler refilled 1/24/22, 2/3/22, 5/27/22, 6/29/22,8/11/22, 9/2/22, 10/22/22 & 1/19/23 and advised follow up.      Seen 1/20/23 for preventive health. Healthcare maintenance reviewed. Did not have healthcare directives and honoring choices given to review. Flu shot up-to-date. COVID-vaccine primary and booster series up-to-date. Prevnar given. Recommended hepatitis B vaccination felt may have had it when younger, was to check records with mom from [place] where grew up and if unable to find to do immune testing in the future and go from there. Moderate persistent asthma  improved from a year ago but still not at goal.  Continued on Advair 250/50 1 puff twice daily rinse mouth well after using  inhaler. To limit use of albuterol as rescue only, had been filled 8 times in the last year which suggested high use & need for better asthma control. Initially filling freq was due to not being on Advair then due to losing inhalers at least 3 different times. To recheck asthma in 3 months.  Encouraged to schedule lung function tests  In the meantime encouraged to avoid Vaping and work on weight loss which may help.  If asthma scores continued to be less than 20 to consider changing Advair to Breo, & seeing a pulmonologist to further evaluate and treat.  Asthma action plan verbally in place.  Prevnar 20 given.  Noted had 3 refills on Advair  and albuterol recently refilled so he was to contact us when these were needed. Chronic idiopathic gout and family history of gout  asymptomatic without any flares on allopurinol 100 mg daily.  Had not required indomethacin use in a long time.  Advised to continue to stay well-hydrated, drink alcohol in moderation, decrease red meat consumption and continue current dosing of allopurinol and will check uric acid levels and make further recommendations.  Refill sent in.   New dx of HTN made. Blood pressure elevated last year and again in jan  Recheck better but still borderline.  Thought due to white coat hypertension/anxiety.  Did have family history of hypertension and genetic predisposition for this. Opted to hold off on medication for now.  EKG did not show any effect of blood pressure on the heart.  Recommended a low-salt diet, low alcohol intake, low caffeine intake, increase physical activity, at least 5 to 10% of total body weight loss to make a difference in blood pressure, avoid anti-inflammatories as much as possible, given blood pressure would be hesitant to be prescribing any stimulants but could discuss that more with a psychiatrist when saw them.  Prescription given for home blood pressure machine.  To check blood pressure at home if consistently over 130 x 80 may  benefit from medication.  To return in 3 months for blood pressure check and if still elevated consider medication at that time. BMI more than 35, reported no significant snoring.  To sleep on side, work on weight loss, low-carb low-fat diet & Increase physical activity. Reviewed multiple pigmented moles and family history of skin cancer & referred to dermatology for skin check. Noted history of trouble concentrating in the past reported no depression or anxiety.  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  Had had no trouble taking tests in the past.  Instead procrastination was the bigger issue. Referred to a community psychiatry to further evaluate and treat. Noted history of COVID treated with Paxlovid in September 2022 with no residual symptoms. Micro albumin was normal, HDL low, triglycerides elevated, CMP otherwise unremarkable, TSH normal, uric acid 7.8, CBC and hemoglobin A1c normal.  On 2/24/2023 called about an albuterol refill.  Advised pulmonary referral and PFT's as recently filled in January. These were not scheduled.     Seen 4/28/23, Asthma better, continued on Advair 250/50 1 puff twice a day, to rinse well after using. Use albuterol only for rescue, use had decreased, Reminded to schedule lung function tests. Held off on pulmonary referral. Encouraged to avoid Vaping. Refills for Advair sent in 1 yr. Sent in one albuterol inhaler for rescue. Had a lot left on one sent in feb.  BP high, recheck better. Encouraged low salt, low caffeine, low alcohol in diet and increased regular physical activity and work to loose 10 % of total body weight. Opted no meds. To recheck in 6 months. Encouraged to get a home blood pressure machine.  To check blood pressure at home if consistently over 130 x 80 may benefit from medication.   Dyslipidemia, / BMI  >34, weight down a bit, to continue to work on diet, exercise, low carb, smaller portion, more plant based mediterranean diet and weight loss. To recheck in jan at next physical   Gout better asymptomatic on allopurinol 100 mg daily, noted was taking more consistently. Had had no gout attacks recently was to wait to see labs to refill med in case dose needed changing. To avoid indocin and other NSAID'S as much as possible as they could raise BP  Reminded to schedule with derm for fh of skin cancer and multiple moles. Sun protection advised. Was to follow up mental health as planned for trouble concentrating and procrastination in August. Checked for hep B immunity as awaited his mom to send us his childhood immunizations, if not immune to consider revaccination against hep B     BMP showed mildly elevated calcium advised to recheck in 1 month.  Uric acid was elevated at 7.8.  Recheck labs on 6/16 showed calcium of 10.1 ionized calcium normal uric acid elevated at 7.6 and not immune to hepatitis B.  Allopurinol increased to 200 mg on 6/29 and to recheck labs at follow-up.  No appointment with the community psychiatrist May did start seeing a therapist on 8/22 for ADHD related symptoms.  Seen them 9/1, 9/22, 10/6, 10/22 and most recently 11/17/2023.    Seen 11/20/23 Asthma well controlled, on Advair 250/50 1 puff twice a day, to rinse well after use, had decreased use of albuterol as needed less than once to twice a month.  Or prior to exercise. Held off on lung function test and pulmonary referral though may be worth getting a baseline lung function test in the new year. Reported was vaping less. Had enough refills till May.  Hypertension  borderline BP recheck better.  Encouraged a low-salt low caffeine low alcohol in diet, increase regular physical activity and work to lose 10% of total body weight. Opted not to take a medication.  Recommended checking blood pressure at home goal less than 130 x 80.  Avoid NSAID's  as much as possible. If not better consider medication.  History of dyslipidemia elevated triglycerides and low HDL, BMI over 35 with elevated blood pressure, options discussed held off on meds or a weight specialist referral. To continue to work on diet exercise low carb intake smaller portions more plant-based and some weight loss.  Chronic idiopathic gout and hyperuricemia asymptomatic on allopurinol 200 mg daily.  Avoiding Indocin and other NSAID's as much as possible as it could raise blood pressure.  to avoid red meat as much as possible and limit alcohol intake.  Elevated calcium noted in April and again in June though ionized calcium at that time had been normal. To recheck calcium along with vitamin D and parathyroid  and if persistently elevated will refer to endocrine.  Noted history of trouble concentrating in the past reported no depression or anxiety.  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 Had had no trouble taking tests in the past.  Instead procrastination was the bigger issue. Referred to a community psychiatry to further evaluate and treat. No apt with psyche made. Did start therapy for skills in 8/2023. Had been doing therapy for the ADHD like symptoms & felt was going well. He got rid of external distractions and still finding tough time concentrating.  Mind would go to daydreaming when trying to do code.  Was advised by therapist to talk about meds and encouraged he call [facility] to make an appointment with psychiatrist there to discuss medications   Multiple moles and family history of skin cancer encouraged to schedule with dermatology. New patch left groin area was most suggestive of a fungal infection like tinea corporis or jock itch.  Advised athlete's foot ointment over-the-counter twice a day to area up to 1 month  after resolved and follow-up with Derm on this.  Flu shot, COVID-vaccine & Hepatitis B booster given as not immune despite prior vaccination and will check titers at physical to see if this booster gives adequate immunity.  Was to return in  for preventive physical and follow-up    Seen by therapist 23, 12/15, , ,  & 3/8/23. Seen by pain clinic 3/6/24 for cervicalgia, RUE myofascial pain. Xray done, started on gabapentin and referred to PT.    Past Medical History:   Diagnosis Date    Chronic idiopathic gout involving toe without tophus     dx age 22 toe swleling,    Elevated blood pressure reading without diagnosis of hypertension 2022    Hypertension, unspecified type 2023    Moderate persistent asthma without complication     dx in middle school     Past Surgical History:   Procedure Laterality Date     CIRCUMCISION      SHOULDER SURGERY Left 2017    left shoulder AC grade 5 separation repair    WISDOM TOOTH EXTRACTION       OB History   No obstetric history on file.     Lab work is in process  Labs reviewed in EPIC  BP Readings from Last 3 Encounters:   24 (!) 148/77   24 (!) 195/94   23 138/78    Wt Readings from Last 3 Encounters:   24 123.8 kg (273 lb)   23 124.7 kg (275 lb)   23 122 kg (269 lb)                  Patient Active Problem List   Diagnosis    Class 2 severe obesity due to excess calories with serious comorbidity and body mass index (BMI) of 35.0 to 35.9 in adult (H)    Moderate persistent asthma without complication    Chronic idiopathic gout    Family history of gout    Hypertension, unspecified type    Dyslipidemia    Multiple pigmented nevi    Family history of skin cancer    Disturbed concentration    Hyperuricemia    Serum calcium elevated     Past Surgical History:   Procedure Laterality Date     CIRCUMCISION      SHOULDER SURGERY Left 2017    left shoulder AC grade 5 separation repair    WISDOM  TOOTH EXTRACTION         Social History     Tobacco Use    Smoking status: Never    Smokeless tobacco: Never   Substance Use Topics    Alcohol use: Yes     Comment: 3 ot 5 a week     Family History   Problem Relation Age of Onset    Thyroid Disease Mother     Gout Father     Aortic Valve Replacement Father     Hypertension Father     Hyperlipidemia Father     Asthma Father     Kidney Disease Maternal Grandmother         had one kidney removed du eto disease    Breast Cancer Maternal Aunt 40    Breast Cancer Other          Current Outpatient Medications   Medication Sig Dispense Refill    allopurinol (ZYLOPRIM) 300 MG tablet Take 1 tablet (300 mg) by mouth daily 90 tablet 0    fluticasone-salmeterol (ADVAIR DISKUS) 250-50 MCG/ACT inhaler Inhale 1 puff into the lungs every 12 hours 1 each 3    lisinopril (ZESTRIL) 10 MG tablet Take 1 tablet (10 mg) by mouth daily 30 tablet 1    gabapentin (NEURONTIN) 300 MG capsule Take 3 capsules (900 mg) by mouth 3 times daily Start taking this medication at bedtime and then follow chart of how I want you to increase this. 270 capsule 1     Allergies   Allergen Reactions    Minocycline Rash     GIVEN FOR ACNE WHEN A TEENAGER AND BROKE OUT IN [place]     Recent Labs   Lab Test 03/18/24  1546 11/20/23  1613 04/28/23  0953 01/30/23  1444 03/03/22  0952 01/24/22  1242   A1C 5.4  --   --  5.3  --  5.4   LDL 90  --   --  87  --  84   HDL 39*  --   --  39*  --  44   TRIG 209*  --   --  285*  --  184*   ALT 29  --   --  27  --  30   CR 0.99 0.88   < > 0.86   < > 0.84   GFRESTIMATED >90 >90   < > >90   < > >90   POTASSIUM 4.4 4.0   < > 4.5   < > 4.3   TSH 2.50  --   --  1.95  --  1.57    < > = values in this interval not displayed.          Review of Systems  Constitutional, HEENT, cardiovascular, pulmonary, GI, , musculoskeletal, neuro, skin, endocrine and psych systems are negative, except as otherwise noted.     Objective    Exam  BP (!) 149/82 (BP Location: Right arm, Patient Position:  "Sitting, Cuff Size: Adult Large)   Temp 97.8  F (36.6  C) (Temporal)   Resp 18   Ht 1.89 m (6' 2.41\")   Wt 123.8 kg (273 lb)   SpO2 100%   BMI 34.67 kg/m     Estimated body mass index is 34.67 kg/m  as calculated from the following:    Height as of this encounter: 1.89 m (6' 2.41\").    Weight as of this encounter: 123.8 kg (273 lb).    Physical Exam  GENERAL: alert and no distress  EYES: Eyes grossly normal to inspection, PERRL and conjunctivae and sclerae normal, glasses  HENT: ear canals and TM's normal, nose and mouth without ulcers or lesions  NECK: no adenopathy, no asymmetry, masses, or scars  RESP: lungs clear to auscultation - no rales, rhonchi or wheezes  CV: regular rate and rhythm, normal S1 S2, no S3 or S4, no murmur, click or rub, no peripheral edema  ABDOMEN: soft, nontender, no hepatosplenomegaly, no masses and bowel sounds normal   (male): normal male genitalia without lesions or urethral discharge, no hernia  MS: no gross musculoskeletal defects noted, no edema  SKIN: no suspicious lesions or rashes, 2 cm lobulated annular like erythematous rash with scaly border and central clearing left upper thigh at inguinal crease near pubic area  NEURO: Normal strength and tone, mentation intact and speech normal  PSYCH: mentation appears normal, affect normal/bright    Signed Electronically by: Mariana Sherman MD  "

## 2024-03-18 NOTE — PATIENT INSTRUCTIONS
Preventive Care Advice   This is general advice given by our system to help you stay healthy. However, your care team may have specific advice just for you. Please talk to your care team about your preventive care needs.  Nutrition  Eat 5 or more servings of fruits and vegetables each day.  Try wheat bread, brown rice and whole grain pasta (instead of white bread, rice, and pasta).  Get enough calcium and vitamin D. Check the label on foods and aim for 100% of the RDA (recommended daily allowance).  Lifestyle  Exercise at least 150 minutes each week   (30 minutes a day, 5 days a week).  Do muscle strengthening activities 2 days a week. These help control your weight and prevent disease.  No smoking.  Wear sunscreen to prevent skin cancer.  Have a dental exam and cleaning every 6 months.  Yearly exams  See your health care team every year to talk about:  Any changes in your health.  Any medicines your care team has prescribed.  Preventive care, family planning, and ways to prevent chronic diseases.  Shots (vaccines)   HPV shots (up to age 26), if you've never had them before.  Hepatitis B shots (up to age 59), if you've never had them before.  COVID-19 shot: Get this shot when it's due.  Flu shot: Get a flu shot every year.  Tetanus shot: Get a tetanus shot every 10 years.  Pneumococcal, hepatitis A, and RSV shots: Ask your care team if you need these based on your risk.  Shingles shot (for age 50 and up).  General health tests  Diabetes screening:  Starting at age 35, Get screened for diabetes at least every 3 years.  If you are younger than age 35, ask your care team if you should be screened for diabetes.  Cholesterol test: At age 39, start having a cholesterol test every 5 years, or more often if advised.  Bone density scan (DEXA): At age 50, ask your care team if you should have this scan for osteoporosis (brittle bones).  Hepatitis C: Get tested at least once in your life.  STIs (sexually transmitted  infections)  Before age 24: Ask your care team if you should be screened for STIs.  After age 24: Get screened for STIs if you're at risk. You are at risk for STIs (including HIV) if:  You are sexually active with more than one person.  You don't use condoms every time.  You or a partner was diagnosed with a sexually transmitted infection.  If you are at risk for HIV, ask about PrEP medicine to prevent HIV.  Get tested for HIV at least once in your life, whether you are at risk for HIV or not.  Cancer screening tests  Cervical cancer screening: If you have a cervix, begin getting regular cervical cancer screening tests at age 21. Most people who have regular screenings with normal results can stop after age 65. Talk about this with your provider.  Breast cancer scan (mammogram): If you've ever had breasts, begin having regular mammograms starting at age 40. This is a scan to check for breast cancer.  Colon cancer screening: It is important to start screening for colon cancer at age 45.  Have a colonoscopy test every 10 years (or more often if you're at risk) Or, ask your provider about stool tests like a FIT test every year or Cologuard test every 3 years.  To learn more about your testing options, visit: https://www.[employer]/506977.pdf.  For help making a decision, visit: https://bit.ly/jf98403.  Prostate cancer screening test: If you have a prostate and are age 55 to 69, ask your provider if you would benefit from a yearly prostate cancer screening test.  Lung cancer screening: If you are a current or former smoker age 50 to 80, ask your care team if ongoing lung cancer screenings are right for you.  For informational purposes only. Not to replace the advice of your health care provider. Copyright   2023 [employer]. All rights reserved. Clinically reviewed by the [facility]. PingMD 263528 - REV 01/24.    Learning About Stress  What is stress?     Stress is your  body's response to a hard situation. Your body can have a physical, emotional, or mental response. Stress is a fact of life for most people, and it affects everyone differently. What causes stress for you may not be stressful for someone else.  A lot of things can cause stress. You may feel stress when you go on a job interview, take a test, or run a race. This kind of short-term stress is normal and even useful. It can help you if you need to work hard or react quickly. For example, stress can help you finish an important job on time.  Long-term stress is caused by ongoing stressful situations or events. Examples of long-term stress include long-term health problems, ongoing problems at work, or conflicts in your family. Long-term stress can harm your health.  How does stress affect your health?  When you are stressed, your body responds as though you are in danger. It makes hormones that speed up your heart, make you breathe faster, and give you a burst of energy. This is called the fight-or-flight stress response. If the stress is over quickly, your body goes back to normal and no harm is done.  But if stress happens too often or lasts too long, it can have bad effects. Long-term stress can make you more likely to get sick, and it can make symptoms of some diseases worse. If you tense up when you are stressed, you may develop neck, shoulder, or low back pain. Stress is linked to high blood pressure and heart disease.  Stress also harms your emotional health. It can make you khalil, tense, or depressed. Your relationships may suffer, and you may not do well at work or school.  What can you do to manage stress?  You can try these things to help manage stress:   Do something active. Exercise or activity can help reduce stress. Walking is a great way to get started. Even everyday activities such as housecleaning or yard work can help.  Try yoga or griselda chi. These techniques combine exercise and meditation. You may need  some training at first to learn them.  Do something you enjoy. For example, listen to music or go to a movie. Practice your hobby or do volunteer work.  Meditate. This can help you relax, because you are not worrying about what happened before or what may happen in the future.  Do guided imagery. Imagine yourself in any setting that helps you feel calm. You can use online videos, books, or a teacher to guide you.  Do breathing exercises. For example:  From a standing position, bend forward from the waist with your knees slightly bent. Let your arms dangle close to the floor.  Breathe in slowly and deeply as you return to a standing position. Roll up slowly and lift your head last.  Hold your breath for just a few seconds in the standing position.  Breathe out slowly and bend forward from the waist.  Let your feelings out. Talk, laugh, cry, and express anger when you need to. Talking with supportive friends or family, a counselor, or a [employer] leader about your feelings is a healthy way to relieve stress. Avoid discussing your feelings with people who make you feel worse.  Write. It may help to write about things that are bothering you. This helps you find out how much stress you feel and what is causing it. When you know this, you can find better ways to cope.  What can you do to prevent stress?  You might try some of these things to help prevent stress:  Manage your time. This helps you find time to do the things you want and need to do.  Get enough sleep. Your body recovers from the stresses of the day while you are sleeping.  Get support. Your family, friends, and community can make a difference in how you experience stress.  Limit your news feed. Avoid or limit time on social media or news that may make you feel stressed.  Do something active. Exercise or activity can help reduce stress. Walking is a great way to get started.  Where can you learn more?  Go to https://www.healthwise.net/patiented  Enter N032 in the  "search box to learn more about \"Learning About Stress.\"  Current as of: October 24, 2023               Content Version: 14.0    7212-3911 [email or web address].   Care instructions adapted under license by your healthcare professional. If you have questions about a medical condition or this instruction, always ask your healthcare professional. [email or web address] disclaims any warranty or liability for your use of this information.      Substance Use Disorder: Care Instructions  Overview     You can improve your life and health by stopping your use of alcohol or drugs. When you don't drink or use drugs, you may feel and sleep better. You may get along better with your family, friends, and coworkers. There are medicines and programs that can help with substance use disorder.  How can you care for yourself at home?  Here are some ways to help you stay sober and prevent relapse.  If you have been given medicine to help keep you sober or reduce your cravings, be sure to take it exactly as prescribed.  Talk to your doctor about programs that can help you stop using drugs or drinking alcohol.  Do not keep alcohol or drugs in your home.  Plan ahead. Think about what you'll say if other people ask you to drink or use drugs. Try not to spend time with people who drink or use drugs.  Use the time and money spent on drinking or drugs to do something that's important to you.  Preventing a relapse  Have a plan to deal with relapse. Learn to recognize changes in your thinking that lead you to drink or use drugs. Get help before you start to drink or use drugs again.  Try to stay away from situations, friends, or places that may lead you to drink or use drugs.  If you feel the need to drink alcohol or use drugs again, seek help right away. Call a trusted friend or family member. Some people get support from organizations such as Narcotics Anonymous or [employer] or from treatment facilities.  If you relapse, get help " as soon as you can. Some people make a plan with another person that outlines what they want that person to do for them if they relapse. The plan usually includes how to handle the relapse and who to notify in case of relapse.  Don't give up. Remember that a relapse doesn't mean that you have failed. Use the experience to learn the triggers that lead you to drink or use drugs. Then quit again. Recovery is a lifelong process. Many people have several relapses before they are able to quit for good.  Follow-up care is a key part of your treatment and safety. Be sure to make and go to all appointments, and call your doctor if you are having problems. It's also a good idea to know your test results and keep a list of the medicines you take.  When should you call for help?   Call 911  anytime you think you may need emergency care. For example, call if you or someone else:    Has overdosed or has withdrawal signs. Be sure to tell the emergency workers that you are or someone else is using or trying to quit using drugs. Overdose or withdrawal signs may include:  Losing consciousness.  Seizure.  Seeing or hearing things that aren't there (hallucinations).     Is thinking or talking about suicide or harming others.   Where to get help 24 hours a day, 7 days a week   If you or someone you know talks about suicide, self-harm, a mental health crisis, a substance use crisis, or any other kind of emotional distress, get help right away. You can:    Call the Suicide and Crisis Lifeline at 980.     Call 8-797-336-TALK (1-269.567.8211).     Text HOME to 862778 to access the Crisis Text Line.   Consider saving these numbers in your phone.  Go to Socialb[employer].org for more information or to chat online.  Call your doctor now or seek immediate medical care if:    You are having withdrawal symptoms. These may include nausea or vomiting, sweating, shakiness, and anxiety.   Watch closely for changes in your health, and be sure to contact  "your doctor if:    You have a relapse.     You need more help or support to stop.   Where can you learn more?  Go to https://www.[employer].net/patiented  Enter H573 in the search box to learn more about \"Substance Use Disorder: Care Instructions.\"  Current as of: November 15, 2023               Content Version: 14.0    9013-4390 [email or web address].   Care instructions adapted under license by your healthcare professional. If you have questions about a medical condition or this instruction, always ask your healthcare professional. [employer] disclaims any warranty or liability for your use of this information.      "

## 2024-03-19 LAB — URATE SERPL-MCNC: 8.5 MG/DL (ref 3.4–7)

## 2024-03-19 RX ORDER — ALLOPURINOL 300 MG/1
300 TABLET ORAL DAILY
Qty: 90 TABLET | Refills: 0 | Status: SHIPPED | OUTPATIENT
Start: 2024-03-19 | End: 2024-06-17

## 2024-03-19 NOTE — RESULT ENCOUNTER NOTE
Hello -    Here are my comments about your recent results:  -HDL(good) cholesterol level is low and your triglycerides are elevated which can increase your heart disease risk.  A diet high in fat and simple carbohydrates, genetics and being overweight can contribute to this. LDL(bad) cholesterol level is normal.  ADVISE:exercising 150 minutes of aerobic exercise per week (30 minutes 5 days per week or 50 minutes 3 days per week are options), and omega-3 fatty acids (fish oil) 5231-5458 mg daily are helpful to improve this.   -Liver and gallbladder tests (ALT,AST, Alk phos,bilirubin) are normal.  -Kidney function (GFR) is normal.  -Sodium is normal.  -Potassium is normal.  -Calcium is elevated again,  prior work up did not show a concerning cause,  ADVISE: rechecking this at next apt   -Glucose (diabetic screening test) is normal.  -TSH (thyroid stimulating hormone) level is normal which indicates normal thyroid function.  -Microalbumin (urine protein) test is normal.  ADVISE: rechecking this annually.  Negative for vitamin B infection and shows immune to hep B now due to vaccinations  For additional lab test information, labtestsonline.org is an excellent reference..    Please let us know if you have any questions or concerns.     Regards,  Mariana Sherman MD

## 2024-03-19 NOTE — RESULT ENCOUNTER NOTE
Hello -    Here are my comments about your recent results:  Uric acid is elevated to 8.5   Recommend avoiding all alcohol & red meat & increasing allopurinol to 300 mg daily  I will send this in & recheck at next apt in few months   -HDL(good) cholesterol level is low and your triglycerides are elevated which can increase your heart disease risk.  A diet high in fat and simple carbohydrates, genetics and being overweight can contribute to this. LDL(bad) cholesterol level is normal.  ADVISE:exercising 150 minutes of aerobic exercise per week (30 minutes 5 days per week or 50 minutes 3 days per week are options), and omega-3 fatty acids (fish oil) 1955-3070 mg daily are helpful to improve this.   -Liver and gallbladder tests (ALT,AST, Alk phos,bilirubin) are normal.  -Kidney function (GFR) is normal.  -Sodium is normal.  -Potassium is normal.  -Calcium is elevated again,  prior work up did not show a concerning cause,  ADVISE: rechecking this at next apt   -Glucose (diabetic screening test) is normal.  -TSH (thyroid stimulating hormone) level is normal which indicates normal thyroid function.  -Microalbumin (urine protein) test is normal.  ADVISE: rechecking this annually.  Negative for vitamin B infection and shows immune to hep B now due to vaccinations    Please let us know if you have any questions or concerns.     Regards,  Mariana Sherman MD

## 2024-03-22 ENCOUNTER — VIRTUAL VISIT (OUTPATIENT)
Dept: PSYCHOLOGY | Facility: CLINIC | Age: 29
End: 2024-03-22
Payer: COMMERCIAL

## 2024-03-22 ENCOUNTER — THERAPY VISIT (OUTPATIENT)
Dept: PHYSICAL THERAPY | Facility: CLINIC | Age: 29
End: 2024-03-22
Attending: PAIN MEDICINE
Payer: COMMERCIAL

## 2024-03-22 DIAGNOSIS — M54.2 CERVICAL PAIN: Primary | ICD-10-CM

## 2024-03-22 DIAGNOSIS — R41.840 ATTENTION AND CONCENTRATION DEFICIT: ICD-10-CM

## 2024-03-22 DIAGNOSIS — M79.601 PAIN OF RIGHT UPPER EXTREMITY: ICD-10-CM

## 2024-03-22 DIAGNOSIS — M54.2 CERVICALGIA: ICD-10-CM

## 2024-03-22 DIAGNOSIS — F41.1 GENERALIZED ANXIETY DISORDER: Primary | ICD-10-CM

## 2024-03-22 DIAGNOSIS — M79.18 MYOFASCIAL PAIN: ICD-10-CM

## 2024-03-22 PROCEDURE — 97161 PT EVAL LOW COMPLEX 20 MIN: CPT | Mod: GP | Performed by: PHYSICAL THERAPIST

## 2024-03-22 PROCEDURE — 90837 PSYTX W PT 60 MINUTES: CPT | Mod: 95 | Performed by: COUNSELOR

## 2024-03-22 PROCEDURE — 97110 THERAPEUTIC EXERCISES: CPT | Mod: GP | Performed by: PHYSICAL THERAPIST

## 2024-03-22 NOTE — PROGRESS NOTES
[facility]                                     Progress Note    Patient Name: David Martin  Date: 3/22/24         Service Type: Individual      Session Start Time: 9:02 am  Session End Time: 10:00 am     Session Length: 58 minutes    Session #: 11    Attendees: Client attended alone    Service Modality:  Video Visit:      Provider verified identity through the following two step process.  Patient provided:  Patient is known previously to provider    Telemedicine Visit: The patient's condition can be safely assessed and treated via synchronous audio and visual telemedicine encounter.      Reason for Telemedicine Visit: Services only offered telehealth    Originating Site (Patient Location): Patient's home    Distant Site (Provider Location): Provider Remote Setting- Home Office    Consent:  The patient/guardian has verbally consented to: the potential risks and benefits of telemedicine (video visit) versus in person care; bill my insurance or make self-payment for services provided; and responsibility for payment of non-covered services.     Patient would like the video invitation sent by:  My Chart    Mode of Communication:  Video Conference via [facility]    Distant Location (Provider):  Off-site    As the provider I attest to compliance with applicable laws and regulations related to telemedicine.    DATA  Interactive Complexity: No  Crisis: No  Extended Session (53+ minutes): PROLONGED SERVICE IN THE OUTPATIENT SETTING REQUIRING DIRECT (FACE-TO-FACE) PATIENT CONTACT BEYOND THE USUAL SERVICE:    - Patient's presenting concerns require more intensive intervention than could be completed within the usual service         Progress Since Last Session (Related to Symptoms / Goals / Homework):   Symptoms: Improving pt is feeling more confident and had only one anxiety episode    Homework: Achieved / completed to satisfaction      Episode of Care Goals: Satisfactory progress - ACTION (Actively working  towards change); Intervened by reinforcing change plan / affirming steps taken     Current / Ongoing Stressors and Concerns:   Pt is currently seeking therapy due to ongoing ADHD-like symptoms (related to processing delay), and procrastination at work which results in anxiety around deadlines. Since last session, pt reported that he is still dealing with his shoulder/neck pain but has started PT which has helped. Pt did get a medical exam and they didn't find anything alarming, expecting that the PT will resolve the issues, which was relieving. Work continues to go well and he has been able to maintain his productivity. Pt did process through a recent anxiety episode he had after work recently. Pt reflected that this wasn't the same situation as before, but the feeling of frustration he was having from working on a specific project at the end of the day seemed to bring him back to his old rut. Pt was able to set limits with himself, and refocus on something else after recognizing that this anxiety was misplaced. Pt overall thinks work is going much better and he is feeling more confident in his ability in that area. Pt has continued to work on his flies for his fly fishing hobby, which has continued to give him a good creative outlet. Pt also find that it having a purpose to it, a utilitarian functionality, gives it more meaning as well. Pt expanded this to also recognizing that it is an activity with multiple facets that feed into his interests which makes it a strong self-reinforcing activity. Pt's neck and shoulder pain has improved enough that he has been able to return to jogging, which has felt good as well. Pt expressed feeling relief that he has been able to start getting back into his routine and is feeling hopeful that he can get back to where he was before his pain issues. Pt's sleep has also improved with his neck/shoulder pain improving with not more sleep disruption. Pt has been doing better with his  "\"frivolous spending\" and noted that he is spending less. Pt also noted that paying down his debt has felt really good/rewarding, which has been a nice surprise to feel. Pt thinks he is more on track with where he wants to be financially. Pt has been trying to keep up with tracking expenses to help them figure out how to change their habits. Session took longer then anticipated due to presenting concern and lack of recent visits.      Treatment Objective(s) Addressed in This Session:   identify and use 3 strategies to improve organization  identify thoughts, triggers, and  stressors which contribute to feelings of anxiety  use cognitive strategies identified in therapy to challenge anxious thoughts  Identify negative self-talk and behaviors: challenge core beliefs, myths, and actions  Improve concentration, focus, and mindfulness in daily activities   identify two areas of life that you would like to have improved functioning  identify at least 3 adaptive coping statements to counteract negative thoughts     Intervention:   CBT: reviewed how routine building has been going with what has and hasn't been helping    Motivational Interviewing    MI Intervention: Expressed Empathy/Understanding, Supported Autonomy, Collaboration, Evocation, Open-ended questions, Reflections: simple and complex, Change talk (evoked), and Reframe     Change Talk Expressed by the Patient: Desire to change Ability to change Reasons to change Need to change Committment to change Activation Taking steps    Provider Response to Change Talk: E - Evoked more info from patient about behavior change, A - Affirmed patient's thoughts, decisions, or attempts at behavior change, R - Reflected patient's change talk, and S - Summarized patient's change talk statements    Psychodynamic: Processed through internal experiences related to how his anxiety feelings have changed since starting therapy  Solution Focused: Identify self-imposed barriers and " possible strategies that he can use to overcome them    Assessments completed prior to visit:  PHQ2:       3/22/2024     8:54 AM 3/18/2024    12:57 PM 3/8/2024     9:00 AM 12/1/2023     9:03 AM 8/22/2023    12:48 PM 4/28/2023     9:24 AM 1/23/2023    10:23 AM   PHQ-2 ( 1999 [employer])   Q1: Little interest or pleasure in doing things 0 0 0 0 0 0 0   Q2: Feeling down, depressed or hopeless 0 0 0 0 0 0 0   PHQ-2 Score 0 0 0 0 0 0 0   Q1: Little interest or pleasure in doing things Not at all Not at all Not at all Not at all Not at all  Not at all    Not at all   Q2: Feeling down, depressed or hopeless Not at all Not at all Not at all Not at all Not at all  Not at all    Not at all   PHQ-2 Score 0 0 0 0 0  0    0     GAD2:       8/22/2023    12:56 PM 12/1/2023     9:03 AM 3/8/2024     9:00 AM 3/22/2024     8:54 AM   LUAN-2   Feeling nervous, anxious, or on edge 2 0 0 0   Not being able to stop or control worrying 1 0 0 0   LUAN-2 Total Score 3 0 0 0     GAD7:       8/22/2023    12:56 PM   LUAN-7 SCORE   Total Score 7 (mild anxiety)   Total Score 7     [place] Suicide Severity Rating Scale (Lifetime/Recent)      8/22/2023     1:39 PM   [place] Suicide Severity Rating (Lifetime/Recent)   Q1 Wish to be Dead (Lifetime) N   Q2 Non-Specific Active Suicidal Thoughts (Lifetime) N   Actual Attempt (Lifetime) N   Has subject engaged in non-suicidal self-injurious behavior? (Lifetime) N   Interrupted Attempts (Lifetime) N   Aborted or Self-Interrupted Attempt (Lifetime) N   Preparatory Acts or Behavior (Lifetime) N   Calculated C-SSRS Risk Score (Lifetime/Recent) No Risk Indicated         ASSESSMENT: Current Emotional / Mental Status (status of significant symptoms):   Risk status (Self / Other harm or suicidal ideation)   Patient denies current fears or concerns for personal safety.   Patient denies current or recent suicidal ideation or behaviors.   Patient denies current or recent homicidal ideation or behaviors.   Patient denies  current or recent self injurious behavior or ideation.   Patient denies other safety concerns.   Patient reports there has been no change in risk factors since their last session.     Patient reports there has been no change in protective factors since their last session.     Recommended that patient call 911 or go to the local ED should there be a change in any of these risk factors.     Appearance:   Appropriate    Eye Contact:   Good    Psychomotor Behavior: Normal    Attitude:   Cooperative  Interested Friendly Pleasant   Orientation:   All   Speech    Rate / Production: Normal/ Responsive Talkative    Volume:  Normal    Mood:    Anxious  Normal   Affect:    Appropriate    Thought Content:  Clear    Thought Form:  Coherent  Logical    Insight:    Good  and Intellectual Insight     Medication Review:   No current psychiatric medications prescribed     Medication Compliance:   NA     Changes in Health Issues:   Yes: Pain, No Psychological Distress     Chemical Use Review:   Substance Use: Chemical use reviewed, no active concerns identified      Tobacco Use: No current tobacco use.      Diagnosis:  1. Generalized anxiety disorder    2. Attention and concentration deficit          Collateral Reports Completed:   Not Applicable    PLAN: (Patient Tasks / Therapist Tasks / Other)  Follow-up on ADHD meds with community psychiatry (extra credit)  Continue with PT and stretching  Keep engaging in fly fishing hobby  Continue to track spending and logging expenses, set time aside each week  Get back into regular exercise routine    Yeyo Isbell [facility]                                                         ______________________________________________________________________    Individual Treatment Plan    Patient's Name: David Martin  YOB: 1995    Date of Creation: 9/22/23  Date Treatment Plan Last Reviewed/Revised: 1/12/24    DSM5 Diagnoses: Attention-Deficit/Hyperactivity Disorder  314.01 (F90.9)  Unspecified Attention -Deficit / Hyperactivity Disorder or 300.02 (F41.1) Generalized Anxiety Disorder  Psychosocial / Contextual Factors: Heterosexual, CIS male, , father of several young children, employed full time, hx of depression, and marital stressors  PROMIS (reviewed every 90 days):   PROMIS-10 Scores        12/15/2023     9:04 AM 1/12/2024     7:52 AM 1/25/2024     8:19 AM   PROMIS-10 Total Score w/o Sub Scores   PROMIS TOTAL - SUBSCORES 30 31 32       Referral / Collaboration:  Referral to another professional/service is not indicated at this time..    Anticipated number of session for this episode of care: 6-9 sessions  Anticipation frequency of session: Biweekly  Anticipated Duration of each session: 38-52 minutes  Treatment plan will be reviewed in 90 days or when goals have been changed.     Measurable Treatment Goal(s) related to diagnosis / functional impairment(s)  Goal 1: Patient will improve his ability to focus/concentrate and be better at not procrastinating to reduce his anxiety around deadlines.    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      Objective #A (Patient Action)    Patient will identify and use 3 strategies to improve organization  identify thoughts, triggers and  stressors which contribute to feelings of anxiety  use cognitive strategies identified in therapy to challenge anxious thoughts  learn at least 3 self-regulation strategies.  Status: Continued - Date(s): 1/12/24     Intervention(s)  Therapist will assign homework related to target objective with the intent to promote pt autonomy and better manage MH.  Facilitate increase in self-awareness  Provide psycho-education on organization and routine building  Teach/promote utilization of habit forming skills and coping skills    Objective #B Reduce avoidance and perfectionism  Patient will identify 3 strategies to more  effectively address stressors  identify patterns of escalation  (i.e. tightness in chest, flushed face, increased heart rate, clenched hands, etc.)  identify at least 3 techniques for intervening on the escalation  Identify negative self-talk and behaviors: challenge core beliefs, myths, and actions  Improve concentration, focus, and mindfulness in daily activities   identify two areas of life that you would like to have improved functioning  identify at least 3 adaptive coping statements to counteract negative thoughts.  Status: Completed - Date: 1/12/24      Intervention(s)  Therapist will assign homework related to target objective with the intent to promote pt autonomy and better manage MH.  Facilitate increase in self-awareness  Provide psycho-education on anxiety-avoidance cycle  Teach/promote utilization of behavior modification    Goal 2: Patient will get more involved in his community or activities that foster feelings of authenticity by living his values or encouraging engagement in things that are important to him.    I will know I've met my goal when I feel like I am putting my money where my mouth is.      Objective #A (Patient Action)    Status: Continued - Date(s): 1/12/24     Patient will identify and compliment positives at least 3 times daily to support positive self-image  participate in community activities to improve mood  attend and participate in social or recreational activities consistently once per week  practice one mindfulness skill each day for 5-10 minutes  use positive self-affirmations daily  identify at least 3 self-care activities.    Intervention(s)  Therapist will assign homework related to target objective with the intent to promote pt autonomy and better manage MH.  Facilitate increase in self-awareness  Provide psycho-education on positive self-talk and internal motivation development  Teach/promote utilization of narrative therapy techniques     Patient has reviewed and agreed  to the above plan.      MYNOR Mayer  September 22, 2023

## 2024-03-22 NOTE — PROGRESS NOTES
PHYSICAL THERAPY EVALUATION  Type of Visit: Evaluation    See electronic medical record for Abuse and Falls Screening details.  Current pain is deep crick in right side of neck.  This can lead to pain down right lateral arm-triceps, like a nerve pain.  No numbness and tingling.    Pt is right hand dominant.  Aggrav: sitting typing, standing typing, driving a car, flexed forward with typing  Allev: heat and ice, stop typing, usually chiro-manipulation with tens  Fly fishing every year and no sxs  DOI: 2/23/24  awoke with crick in neck  Pt is a side and backsleeper.  Has a pillow in arms    Subjective       Presenting condition or subjective complaint: Arthritic pain in right side of neck, causing nerve pain in upper right arm  Date of onset:      Relevant medical history: Arthritis; Asthma; High blood pressure; Overweight   Dates & types of surgery: 2019 - surgical repair of grade 5 AC separation in left shoulder-no problem since: some decr strength    Prior diagnostic imaging/testing results: X-ray     Prior therapy history for the same diagnosis, illness or injury: No      Living Environment  Social support: With a significant other or spouse   Type of home: Apartment/condo   Stairs to enter the home: Yes 24 Is there a railing: Yes   Ramp: No   Stairs inside the home: No       Help at home: None  Equipment owned:       Employment: Yes [employer]  Hobbies/Interests: Fly fishing, fly tying, canoe/kayak/paddleboard, jogging, biking, video games    Patient goals for therapy: Type at a computer without nerve pain in arm, engage in moderate to intense exercise without pain in neck or arm.         Objective   CERVICAL SPINE EVALUATION        ROM:   (Degrees) Left AROM Right AROM    Cervical Flexion 100% right scap sxs    Cervical Extension 100% and arm sxs no change  with repeated motion    Cervical Side bend 100% 100% and pain    Cervical Rotation 100% 100% and pain    Cervical Protrusion     Cervical  Retraction Pain that decr with sxs    Thoracic Flexion     Thoracic Extension     Thoracic Rotation       Left AROM Left PROM Right AROM Right PROM   Shoulder Flexion       Shoulder Extension       Shoulder Abduction       Shoulder Adduction       Shoulder IR       Shoulder ER       Shoulder Horiz Abduction       Shoulder Horiz Adduction       MYOTOMES:    Left Right   C1-2 (Neck Flexion) 5 5   C3 (Neck Side Bend)  5 5   C4 (Shrug) 5 5   C5 (Deltoid) 5 5   C6 (Biceps) 5 5   C7 (Triceps) 5 5   C8 (Thumb Ext) 5 5   T1 (Intrinsics) 5 5       FLEXIBILITY:  decr pec flexibility IGOR    SPECIAL TESTS: negative spurlings  PALPATION: incr tone and tenderness right UT  Assessment & Plan   CLINICAL IMPRESSIONS  Medical Diagnosis:      Treatment Diagnosis:     Impression/Assessment: Patient is a 28 year old male with cervical pain DDD, right radiculopathy complaints.  The following significant findings have been identified: Pain, Decreased strength, Decreased activity tolerance, and Impaired posture. These impairments interfere with their ability to perform self care tasks, recreational activities, and household chores as compared to previous level of function. driving    Clinical Decision Making (Complexity):  Clinical Presentation: Stable/Uncomplicated  Clinical Presentation Rationale: based on medical and personal factors listed in PT evaluation  Clinical Decision Making (Complexity): Low complexity    PLAN OF CARE  Treatment Interventions:  Interventions: Manual Therapy, Neuromuscular Re-education, Therapeutic Activity, Therapeutic Exercise    Long Term Goals            Frequency of Treatment:    Duration of Treatment:      Recommended Referrals to Other Professionals:  none  Education Assessment:        Risks and benefits of evaluation/treatment have been explained.   Patient/Family/caregiver agrees with Plan of Care.     Evaluation Time:             Signing Clinician: Kristofer Mehta, PT

## 2024-03-29 ENCOUNTER — THERAPY VISIT (OUTPATIENT)
Dept: PHYSICAL THERAPY | Facility: CLINIC | Age: 29
End: 2024-03-29
Payer: COMMERCIAL

## 2024-03-29 DIAGNOSIS — M54.2 CERVICAL PAIN: Primary | ICD-10-CM

## 2024-03-29 PROCEDURE — 97110 THERAPEUTIC EXERCISES: CPT | Mod: GP

## 2024-04-03 ENCOUNTER — OFFICE VISIT (OUTPATIENT)
Dept: PHYSICAL MEDICINE AND REHAB | Facility: CLINIC | Age: 29
End: 2024-04-03
Payer: COMMERCIAL

## 2024-04-03 VITALS — SYSTOLIC BLOOD PRESSURE: 139 MMHG | HEART RATE: 87 BPM | DIASTOLIC BLOOD PRESSURE: 73 MMHG | OXYGEN SATURATION: 97 %

## 2024-04-03 DIAGNOSIS — M47.812 CERVICAL SPONDYLOSIS WITHOUT MYELOPATHY: ICD-10-CM

## 2024-04-03 DIAGNOSIS — M79.18 MYOFASCIAL PAIN: ICD-10-CM

## 2024-04-03 DIAGNOSIS — M79.601 PAIN OF RIGHT UPPER EXTREMITY: Primary | ICD-10-CM

## 2024-04-03 PROCEDURE — 99214 OFFICE O/P EST MOD 30 MIN: CPT | Performed by: PAIN MEDICINE

## 2024-04-03 ASSESSMENT — PAIN SCALES - GENERAL: PAINLEVEL: MODERATE PAIN (4)

## 2024-04-03 NOTE — PATIENT INSTRUCTIONS
I ordered an MRI of your neck.  Once have reviewed this I will send you a MyChart note discussing results and potential treatment plan.    Please continue with physical therapy.    ~Please call Nurse Navigation line [phone] with any questions or concerns about your treatment plan, if symptoms worsen and you would like to be seen urgently, or if you have problems controlling bladder and bowel function.

## 2024-04-03 NOTE — PROGRESS NOTES
Assessment:     Diagnoses and all orders for this visit:  Pain of right upper extremity  -     MR Cervical Spine w/o Contrast; Future  Myofascial pain  -     MR Cervical Spine w/o Contrast; Future  Cervical spondylosis without myelopathy  -     MR Cervical Spine w/o Contrast; Future     David Martin is a 28 year old y.o. male with past medical history significant for asthma, obesity, chronic idiopathic gout, hyperlipidemia, hyperuricemia, elevated calcium, hypertension, disturbed concentration who presents today for follow-up regarding neck and right upper extremity pain:     -Patient continues to have neck and right upper extremity pain to his elbow despite conservative management.  Pain is likely secondary to cervical radiculopathy.  Spurling's is positive.  I ordered an MRI of the cervical spine.  I will send a MyChart note with results and recommendations.    Plan:     A shared decision making plan was used.  The patient's values and choices were respected. Prior medical records from our last visit on 3/6/2024 were reviewed today. The following represents what was discussed and decided upon by the provider and the patient.     -DIAGNOSTIC TESTS: Images were personally reviewed and interpreted.   -- X-ray of the cervical spine dated 3/12/2024 is personally reviewed images interpreted and discussed with patient.  There are mild spondylitic changes throughout the cervical spine with mild loss of disc height space on C5-6 with mild facet arthropathy throughout.     -INTERVENTIONS: No interventions at this time.    -MEDICATIONS: Recommend he continue taking gabapentin 300 mg at bedtime.  Taking it during the daytime causes drowsiness.  We discussed pregabalin, however he would like to stay on gabapentin at this time.  -  Discussed side effects of medications and proper use. Patient verbalized understanding.    -PHYSICAL THERAPY: Recommend that he continue with physical therapy sessions and home exercises.       -PATIENT EDUCATION: We discussed pain management in a multiple to fashion including physical therapy, medication management, possible future injections.    -FOLLOW UP: Patient will follow-up in 4 weeks.  Advised to contact clinic if symptoms worsen or change.    Subjective:     David Martin is a 28 year old male who presents today for follow-up regarding neck and right upper extremity pain.  Patient notes that he continues to have neck and right upper extremity pain to just above the elbow.  Pain is especially worse when he extends his neck and turns his head to the right he has shooting pain down the right arm.  Pain is also worse when he is typing at work or driving.  If he gets up and moves around then pain has improved some.  He is taking gabapentin 300 mg at bedtime and finds that this is helpful for pain.  He tried it during the day, however is too sleepy with this and is not taking it then any longer.  Pain is improved with heat and ice as well as TENS unit.  Pain today is 4/10 its worst is 9/10 its best a 0/10.  He denies any bowel or bladder changes, fevers, chills, unintentional weight loss.    No myelopathic symptoms.     Evaluation to Date: X-ray of cervical spine dated 3/12/2024.    Treatment to Date: Chiropractic care.  Physical therapy.    Patient Active Problem List   Diagnosis    Class 2 severe obesity due to excess calories with serious comorbidity and body mass index (BMI) of 35.0 to 35.9 in adult (H)    Moderate persistent asthma without complication    Chronic idiopathic gout    Family history of gout    Hypertension, unspecified type    Dyslipidemia    Multiple pigmented nevi    Family history of skin cancer    Disturbed concentration    Hyperuricemia    Serum calcium elevated    Cervical pain       Current Outpatient Medications   Medication Sig Dispense Refill    allopurinol (ZYLOPRIM) 300 MG tablet Take 1 tablet (300 mg) by mouth daily 90 tablet 0    fluticasone-salmeterol (ADVAIR  DISKUS) 250-50 MCG/ACT inhaler Inhale 1 puff into the lungs every 12 hours 1 each 3    gabapentin (NEURONTIN) 300 MG capsule Take 3 capsules (900 mg) by mouth 3 times daily Start taking this medication at bedtime and then follow chart of how I want you to increase this. 270 capsule 1    lisinopril (ZESTRIL) 10 MG tablet Take 1 tablet (10 mg) by mouth daily 30 tablet 1     No current facility-administered medications for this visit.       Allergies   Allergen Reactions    Minocycline Rash     GIVEN FOR ACNE WHEN A TEENAGER AND BROKE OUT IN [employer]       Past Medical History:   Diagnosis Date    Chronic idiopathic gout involving toe without tophus     dx age 22 toe swleling,    Elevated blood pressure reading without diagnosis of hypertension 1/24/2022    Hypertension, unspecified type 4/28/2023    Moderate persistent asthma without complication     dx in middle school        Review of Systems  ROS: Specifically negative for bowel/bladder dysfunction, balance changes, headache, dizziness, foot drop, fevers, chills, appetite changes, nausea/vomiting, unexplained weight loss. Otherwise 13 systems reviewed are negative. Please see the patient's intake questionnaire from today for details.    Reviewed Social, Family, Past Medical and Past Surgical history with patient, no significant changes noted since prior visit.     Objective:     /73   Pulse 87   SpO2 97%     PHYSICAL EXAMINATION:   --CONSTITUTIONAL: Vital signs as above. No acute distress. The patient is well nourished and well groomed.  --PSYCHIATRIC:  The patient is awake, alert, oriented to person, place, time and answering questions appropriately with clear speech. Appropriate mood and affect   --HEENT: Sclera are non-injected.  --SKIN: Skin over the face is clean, dry, intact without rashes.  --RESPIRATORY: Normal rhythm and effort. No abnormal accessory muscle breathing patterns noted.   --GROSS MOTOR: Easily arises from a seated position.   --CERVICAL  SPINE: Inspection reveals no evidence of deformity. Range of motion is mildly limited in cervical flexion, extension, lateral rotation. No tenderness to palpation cervical spine.  Spurling's maneuver is positive on the right for radicular pain.  --SHOULDERS: Full range of motion bilaterally. Negative empty can.  --UPPER EXTREMITY MOTOR TESTING:  Wrist flexion left 5/5, right 5/5  Wrist extension left 5/5, right 5/5  Pronators left 5/5, right 5/5  Biceps left 5/5, right 5/5   Triceps left 5/5, right 5/5   Shoulder abduction left 5/5, right 5/5   left 5/5, right 5/5  --Sensation to upper extremities is intact. Negative Boo's bilaterally.   --VASCULAR: Warm upper limbs bilaterally.     Imaging of the cervical spine: Cervical spine imaging was reviewed today.     XR Cervical Spine 2/3 Views    Result Date: 3/12/2024  EXAM: XR CERVICAL SPINE 2/3 VIEWS 3/12/2024 12:51 PM HISTORY: Cervicalgia; Pain of right upper extremity; Myofascial pain COMPARISON: None FINDINGS: AP and lateral views of the cervical spine were obtained. Normal cervical spine alignment. Mild loss of disc height at C5-6. Scattered mild degenerative changes including facet arthropathy, uncinate hypertrophy, and anterior endplate osteophytosis. No prevertebral edema. No mass in the visualized lung apices.     IMPRESSION: Cervical degenerative changes, most pronounced at C5-6. I have personally reviewed the examination and initial interpretation and I agree with the findings. SOUTH MANLEY MD   SYSTEM ID:  [id number]

## 2024-04-03 NOTE — LETTER
4/3/2024         RE: David Martin  [id number]  [place] [address]        Dear Colleague,    Thank you for referring your patient, David Martin, to the [facility]. Please see a copy of my visit note below.      Assessment:     Diagnoses and all orders for this visit:  Pain of right upper extremity  -     MR Cervical Spine w/o Contrast; Future  Myofascial pain  -     MR Cervical Spine w/o Contrast; Future  Cervical spondylosis without myelopathy  -     MR Cervical Spine w/o Contrast; Future     David Martin is a 28 year old y.o. male with past medical history significant for asthma, obesity, chronic idiopathic gout, hyperlipidemia, hyperuricemia, elevated calcium, hypertension, disturbed concentration who presents today for follow-up regarding neck and right upper extremity pain:     -Patient continues to have neck and right upper extremity pain to his elbow despite conservative management.  Pain is likely secondary to cervical radiculopathy.  Spurling's is positive.  I ordered an MRI of the cervical spine.  I will send a MyChart note with results and recommendations.    Plan:     A shared decision making plan was used.  The patient's values and choices were respected. Prior medical records from our last visit on 3/6/2024 were reviewed today. The following represents what was discussed and decided upon by the provider and the patient.     -DIAGNOSTIC TESTS: Images were personally reviewed and interpreted.   -- X-ray of the cervical spine dated 3/12/2024 is personally reviewed images interpreted and discussed with patient.  There are mild spondylitic changes throughout the cervical spine with mild loss of disc height space on C5-6 with mild facet arthropathy throughout.     -INTERVENTIONS: No interventions at this time.    -MEDICATIONS: Recommend he continue taking gabapentin 300 mg at bedtime.  Taking it during the daytime causes drowsiness.  We discussed  pregabalin, however he would like to stay on gabapentin at this time.  -  Discussed side effects of medications and proper use. Patient verbalized understanding.    -PHYSICAL THERAPY: Recommend that he continue with physical therapy sessions and home exercises.      -PATIENT EDUCATION: We discussed pain management in a multiple to fashion including physical therapy, medication management, possible future injections.    -FOLLOW UP: Patient will follow-up in 4 weeks.  Advised to contact clinic if symptoms worsen or change.    Subjective:     David Martin is a 28 year old male who presents today for follow-up regarding neck and right upper extremity pain.  Patient notes that he continues to have neck and right upper extremity pain to just above the elbow.  Pain is especially worse when he extends his neck and turns his head to the right he has shooting pain down the right arm.  Pain is also worse when he is typing at work or driving.  If he gets up and moves around then pain has improved some.  He is taking gabapentin 300 mg at bedtime and finds that this is helpful for pain.  He tried it during the day, however is too sleepy with this and is not taking it then any longer.  Pain is improved with heat and ice as well as TENS unit.  Pain today is 4/10 its worst is 9/10 its best a 0/10.  He denies any bowel or bladder changes, fevers, chills, unintentional weight loss.    No myelopathic symptoms.     Evaluation to Date: X-ray of cervical spine dated 3/12/2024.    Treatment to Date: Chiropractic care.  Physical therapy.    Patient Active Problem List   Diagnosis     Class 2 severe obesity due to excess calories with serious comorbidity and body mass index (BMI) of 35.0 to 35.9 in adult (H)     Moderate persistent asthma without complication     Chronic idiopathic gout     Family history of gout     Hypertension, unspecified type     Dyslipidemia     Multiple pigmented nevi     Family history of skin cancer     Disturbed  concentration     Hyperuricemia     Serum calcium elevated     Cervical pain       Current Outpatient Medications   Medication Sig Dispense Refill     allopurinol (ZYLOPRIM) 300 MG tablet Take 1 tablet (300 mg) by mouth daily 90 tablet 0     fluticasone-salmeterol (ADVAIR DISKUS) 250-50 MCG/ACT inhaler Inhale 1 puff into the lungs every 12 hours 1 each 3     gabapentin (NEURONTIN) 300 MG capsule Take 3 capsules (900 mg) by mouth 3 times daily Start taking this medication at bedtime and then follow chart of how I want you to increase this. 270 capsule 1     lisinopril (ZESTRIL) 10 MG tablet Take 1 tablet (10 mg) by mouth daily 30 tablet 1     No current facility-administered medications for this visit.       Allergies   Allergen Reactions     Minocycline Rash     GIVEN FOR ACNE WHEN A TEENAGER AND BROKE OUT IN [place]       Past Medical History:   Diagnosis Date     Chronic idiopathic gout involving toe without tophus     dx age 22 toe swleling,     Elevated blood pressure reading without diagnosis of hypertension 1/24/2022     Hypertension, unspecified type 4/28/2023     Moderate persistent asthma without complication     dx in middle school        Review of Systems  ROS: Specifically negative for bowel/bladder dysfunction, balance changes, headache, dizziness, foot drop, fevers, chills, appetite changes, nausea/vomiting, unexplained weight loss. Otherwise 13 systems reviewed are negative. Please see the patient's intake questionnaire from today for details.    Reviewed Social, Family, Past Medical and Past Surgical history with patient, no significant changes noted since prior visit.     Objective:     /73   Pulse 87   SpO2 97%     PHYSICAL EXAMINATION:   --CONSTITUTIONAL: Vital signs as above. No acute distress. The patient is well nourished and well groomed.  --PSYCHIATRIC:  The patient is awake, alert, oriented to person, place, time and answering questions appropriately with clear speech. Appropriate mood  and affect   --HEENT: Sclera are non-injected.  --SKIN: Skin over the face is clean, dry, intact without rashes.  --RESPIRATORY: Normal rhythm and effort. No abnormal accessory muscle breathing patterns noted.   --GROSS MOTOR: Easily arises from a seated position.   --CERVICAL SPINE: Inspection reveals no evidence of deformity. Range of motion is mildly limited in cervical flexion, extension, lateral rotation. No tenderness to palpation cervical spine.  Spurling's maneuver is positive on the right for radicular pain.  --SHOULDERS: Full range of motion bilaterally. Negative empty can.  --UPPER EXTREMITY MOTOR TESTING:  Wrist flexion left 5/5, right 5/5  Wrist extension left 5/5, right 5/5  Pronators left 5/5, right 5/5  Biceps left 5/5, right 5/5   Triceps left 5/5, right 5/5   Shoulder abduction left 5/5, right 5/5   left 5/5, right 5/5  --Sensation to upper extremities is intact. Negative Boo's bilaterally.   --VASCULAR: Warm upper limbs bilaterally.     Imaging of the cervical spine: Cervical spine imaging was reviewed today.     XR Cervical Spine 2/3 Views    Result Date: 3/12/2024  EXAM: XR CERVICAL SPINE 2/3 VIEWS 3/12/2024 12:51 PM HISTORY: Cervicalgia; Pain of right upper extremity; Myofascial pain COMPARISON: None FINDINGS: AP and lateral views of the cervical spine were obtained. Normal cervical spine alignment. Mild loss of disc height at C5-6. Scattered mild degenerative changes including facet arthropathy, uncinate hypertrophy, and anterior endplate osteophytosis. No prevertebral edema. No mass in the visualized lung apices.     IMPRESSION: Cervical degenerative changes, most pronounced at C5-6. I have personally reviewed the examination and initial interpretation and I agree with the findings. SOUTH MANLEY MD   SYSTEM ID:  [id number]                    Again, thank you for allowing me to participate in the care of your patient.        Sincerely,        Rayshawn Nieto, DO

## 2024-04-05 ENCOUNTER — VIRTUAL VISIT (OUTPATIENT)
Dept: PSYCHOLOGY | Facility: CLINIC | Age: 29
End: 2024-04-05
Payer: COMMERCIAL

## 2024-04-05 DIAGNOSIS — F41.1 GENERALIZED ANXIETY DISORDER: Primary | ICD-10-CM

## 2024-04-05 DIAGNOSIS — F90.8 ATTENTION DEFICIT HYPERACTIVITY DISORDER (ADHD), OTHER TYPE: ICD-10-CM

## 2024-04-05 PROCEDURE — 90837 PSYTX W PT 60 MINUTES: CPT | Mod: 95 | Performed by: COUNSELOR

## 2024-04-05 NOTE — PROGRESS NOTES
[facility]                                     Progress Note    Patient Name: David Martin  Date: 4/05/24         Service Type: Individual      Session Start Time: 9:01 am  Session End Time: 10:00 am     Session Length: 59 minutes    Session #: 12    Attendees: Client attended alone    Service Modality:  Video Visit:      Provider verified identity through the following two step process.  Patient provided:  Patient is known previously to provider    Telemedicine Visit: The patient's condition can be safely assessed and treated via synchronous audio and visual telemedicine encounter.      Reason for Telemedicine Visit: Services only offered telehealth    Originating Site (Patient Location): Patient's home    Distant Site (Provider Location): Provider Remote Setting- Home Office    Consent:  The patient/guardian has verbally consented to: the potential risks and benefits of telemedicine (video visit) versus in person care; bill my insurance or make self-payment for services provided; and responsibility for payment of non-covered services.     Patient would like the video invitation sent by:  My Chart    Mode of Communication:  Video Conference via [facility]    Distant Location (Provider):  Off-site    As the provider I attest to compliance with applicable laws and regulations related to telemedicine.    DATA  Interactive Complexity: No  Crisis: No  Extended Session (53+ minutes): PROLONGED SERVICE IN THE OUTPATIENT SETTING REQUIRING DIRECT (FACE-TO-FACE) PATIENT CONTACT BEYOND THE USUAL SERVICE:    - Patient's presenting concerns require more intensive intervention than could be completed within the usual service         Progress Since Last Session (Related to Symptoms / Goals / Homework):   Symptoms: Improving pt is feeling more confident and excited about his progress    Homework: Achieved / completed to satisfaction      Episode of Care Goals: Satisfactory progress - ACTION (Actively working  towards change); Intervened by reinforcing change plan / affirming steps taken     Current / Ongoing Stressors and Concerns:   Pt is currently seeking therapy due to ongoing ADHD-like symptoms (related to processing delay), and procrastination at work which results in anxiety around deadlines. Since last session, pt reported that he has been doing well and things have felt stable overall. His workload has been a bit more challenging, though has been expected with the natural ebb and flow of his field. Pt is feeling like his confidence at work continues to improve and his anxiety he feels now is more centered around his workload instead of his work quality, which he thinks is an improvement. Pt does have a stressful meeting coming up but is feeling optimistic about it. Pt has been working through his neck and should pain, which he has been addressing through PT and yet that hasn't really changed anything at this point. The acute pain is still present at times, which can be very bad, though the chronic pain he was having has improved enough that it isn't distracting him like it was previously. Pt has been able to exercise consistently more so now with the chronic pain symptoms improving, and is finding running outside to be very motivating compared to running on the treadmill, which has helped as well. Pt reflected that since the Christmas shopping season, he has been more financially responsible and he has felt really rewarding having more self-discipline. Pt briefly talked about how they have been working as a couple to be more cognizant of their household budget and how he is now navigating having to save up for their wedding. Strategies to address any potential stressors around finances as a couple were discussed, with pt agreeing that they are effectively working together already after talking through it. Pt expressed that his overall distractibility has been better as well to the point that his phone can remain  in his pocket without becoming a distraction at times; which pt was very proud of. Session took longer then anticipated due to presenting concern and lack of recent visits.      Treatment Objective(s) Addressed in This Session:   identify and use 3 strategies to improve organization  identify thoughts, triggers, and  stressors which contribute to feelings of anxiety  use cognitive strategies identified in therapy to challenge anxious thoughts  Identify negative self-talk and behaviors: challenge core beliefs, myths, and actions  Improve concentration, focus, and mindfulness in daily activities   identify two areas of life that you would like to have improved functioning  identify at least 3 adaptive coping statements to counteract negative thoughts     Intervention:   CBT: reviewed how routine building has been going with what has and hasn't been helping    Motivational Interviewing    MI Intervention: Expressed Empathy/Understanding, Supported Autonomy, Collaboration, Evocation, Open-ended questions, Reflections: simple and complex, Change talk (evoked), and Reframe     Change Talk Expressed by the Patient: Desire to change Ability to change Reasons to change Need to change Committment to change Activation Taking steps    Provider Response to Change Talk: E - Evoked more info from patient about behavior change, A - Affirmed patient's thoughts, decisions, or attempts at behavior change, R - Reflected patient's change talk, and S - Summarized patient's change talk statements    Psychodynamic: Processed through internal experiences related to how his anxiety feelings have changed since starting therapy  Solution Focused: Identify self-imposed barriers and possible strategies that he can use to overcome them    Assessments completed prior to visit:  PHQ2:       4/5/2024     8:48 AM 3/22/2024     8:54 AM 3/18/2024    12:57 PM 3/8/2024     9:00 AM 12/1/2023     9:03 AM 8/22/2023    12:48 PM 4/28/2023     9:24 AM   PHQ-2  ( 1999 [employer])   Q1: Little interest or pleasure in doing things 0 0 0 0 0 0 0   Q2: Feeling down, depressed or hopeless 0 0 0 0 0 0 0   PHQ-2 Score 0 0 0 0 0 0 0   Q1: Little interest or pleasure in doing things Not at all Not at all Not at all Not at all Not at all Not at all    Q2: Feeling down, depressed or hopeless Not at all Not at all Not at all Not at all Not at all Not at all    PHQ-2 Score 0 0 0 0 0 0      GAD2:       8/22/2023    12:56 PM 12/1/2023     9:03 AM 3/8/2024     9:00 AM 3/22/2024     8:54 AM 4/5/2024     8:48 AM   LUAN-2   Feeling nervous, anxious, or on edge 2 0 0 0 1   Not being able to stop or control worrying 1 0 0 0 0   LUAN-2 Total Score 3 0 0 0 1     GAD7:       8/22/2023    12:56 PM   LUAN-7 SCORE   Total Score 7 (mild anxiety)   Total Score 7     [place] Suicide Severity Rating Scale (Lifetime/Recent)      8/22/2023     1:39 PM   [place] Suicide Severity Rating (Lifetime/Recent)   Q1 Wish to be Dead (Lifetime) N   Q2 Non-Specific Active Suicidal Thoughts (Lifetime) N   Actual Attempt (Lifetime) N   Has subject engaged in non-suicidal self-injurious behavior? (Lifetime) N   Interrupted Attempts (Lifetime) N   Aborted or Self-Interrupted Attempt (Lifetime) N   Preparatory Acts or Behavior (Lifetime) N   Calculated C-SSRS Risk Score (Lifetime/Recent) No Risk Indicated         ASSESSMENT: Current Emotional / Mental Status (status of significant symptoms):   Risk status (Self / Other harm or suicidal ideation)   Patient denies current fears or concerns for personal safety.   Patient denies current or recent suicidal ideation or behaviors.   Patient denies current or recent homicidal ideation or behaviors.   Patient denies current or recent self injurious behavior or ideation.   Patient denies other safety concerns.   Patient reports there has been no change in risk factors since their last session.     Patient reports there has been no change in protective factors since their last session.   "   Recommended that patient call 911 or go to the local ED should there be a change in any of these risk factors.     Appearance:   Appropriate    Eye Contact:   Good    Psychomotor Behavior: Normal    Attitude:   Cooperative  Interested Friendly Pleasant   Orientation:   All   Speech    Rate / Production: Normal/ Responsive Talkative    Volume:  Normal    Mood:    Anxious  Normal   Affect:    Appropriate    Thought Content:  Clear    Thought Form:  Coherent  Logical    Insight:    Good  and Intellectual Insight     Medication Review:   No current psychiatric medications prescribed     Medication Compliance:   NA     Changes in Health Issues:   Yes: Pain, No Psychological Distress     Chemical Use Review:   Substance Use: Chemical use reviewed, no active concerns identified      Tobacco Use: No current tobacco use.      Diagnosis:  1. Generalized anxiety disorder    2. Attention deficit hyperactivity disorder (ADHD), other type        Collateral Reports Completed:   Not Applicable    PLAN: (Patient Tasks / Therapist Tasks / Other)  Continue with PT and stretching  Keep up with regular exercise routine   Practice running/working in \"silence\"   Look into running meditation (extra credit)    Yeyo Isbell [facility]                                                         ______________________________________________________________________    Individual Treatment Plan    Patient's Name: David Martin  YOB: 1995    Date of Creation: 9/22/23  Date Treatment Plan Last Reviewed/Revised: 1/12/24    DSM5 Diagnoses: Attention-Deficit/Hyperactivity Disorder  314.01 (F90.9) Unspecified Attention -Deficit / Hyperactivity Disorder or 300.02 (F41.1) Generalized Anxiety Disorder  Psychosocial / Contextual Factors: Heterosexual, CIS male, , father of several young children, employed full time, hx of depression, and marital stressors  PROMIS (reviewed every 90 days):   PROMIS-10 Scores        12/15/2023     " 9:04 AM 1/12/2024     7:52 AM 1/25/2024     8:19 AM   PROMIS-10 Total Score w/o Sub Scores   PROMIS TOTAL - SUBSCORES 30 31 32       Referral / Collaboration:  Referral to another professional/service is not indicated at this time..    Anticipated number of session for this episode of care: 6-9 sessions  Anticipation frequency of session: Biweekly  Anticipated Duration of each session: 38-52 minutes  Treatment plan will be reviewed in 90 days or when goals have been changed.     Measurable Treatment Goal(s) related to diagnosis / functional impairment(s)  Goal 1: Patient will improve his ability to focus/concentrate and be better at not procrastinating to reduce his anxiety around deadlines.    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      Objective #A (Patient Action)    Patient will identify and use 3 strategies to improve organization  identify thoughts, triggers and  stressors which contribute to feelings of anxiety  use cognitive strategies identified in therapy to challenge anxious thoughts  learn at least 3 self-regulation strategies.  Status: Continued - Date(s): 1/12/24     Intervention(s)  Therapist will assign homework related to target objective with the intent to promote pt autonomy and better manage MH.  Facilitate increase in self-awareness  Provide psycho-education on organization and routine building  Teach/promote utilization of habit forming skills and coping skills    Objective #B Reduce avoidance and perfectionism  Patient will identify 3 strategies to more effectively address stressors  identify patterns of escalation  (i.e. tightness in chest, flushed face, increased heart rate, clenched hands, etc.)  identify at least 3 techniques for intervening on the escalation  Identify negative self-talk and behaviors: challenge core beliefs, myths, and actions  Improve concentration, focus, and mindfulness in  daily activities   identify two areas of life that you would like to have improved functioning  identify at least 3 adaptive coping statements to counteract negative thoughts.  Status: Completed - Date: 1/12/24      Intervention(s)  Therapist will assign homework related to target objective with the intent to promote pt autonomy and better manage MH.  Facilitate increase in self-awareness  Provide psycho-education on anxiety-avoidance cycle  Teach/promote utilization of behavior modification    Goal 2: Patient will get more involved in his community or activities that foster feelings of authenticity by living his values or encouraging engagement in things that are important to him.    I will know I've met my goal when I feel like I am putting my money where my mouth is.      Objective #A (Patient Action)    Status: Continued - Date(s): 1/12/24     Patient will identify and compliment positives at least 3 times daily to support positive self-image  participate in community activities to improve mood  attend and participate in social or recreational activities consistently once per week  practice one mindfulness skill each day for 5-10 minutes  use positive self-affirmations daily  identify at least 3 self-care activities.    Intervention(s)  Therapist will assign homework related to target objective with the intent to promote pt autonomy and better manage MH.  Facilitate increase in self-awareness  Provide psycho-education on positive self-talk and internal motivation development  Teach/promote utilization of narrative therapy techniques     Patient has reviewed and agreed to the above plan.      MYNOR Mayer  September 22, 2023

## 2024-04-08 ENCOUNTER — THERAPY VISIT (OUTPATIENT)
Dept: PHYSICAL THERAPY | Facility: CLINIC | Age: 29
End: 2024-04-08
Payer: COMMERCIAL

## 2024-04-08 DIAGNOSIS — M54.2 CERVICAL PAIN: Primary | ICD-10-CM

## 2024-04-08 PROCEDURE — 97530 THERAPEUTIC ACTIVITIES: CPT | Mod: GP

## 2024-04-08 PROCEDURE — 97110 THERAPEUTIC EXERCISES: CPT | Mod: GP

## 2024-04-19 ENCOUNTER — VIRTUAL VISIT (OUTPATIENT)
Dept: PSYCHOLOGY | Facility: CLINIC | Age: 29
End: 2024-04-19
Payer: COMMERCIAL

## 2024-04-19 DIAGNOSIS — F90.8 ATTENTION DEFICIT HYPERACTIVITY DISORDER (ADHD), OTHER TYPE: ICD-10-CM

## 2024-04-19 DIAGNOSIS — F41.1 GENERALIZED ANXIETY DISORDER: Primary | ICD-10-CM

## 2024-04-19 PROCEDURE — 90832 PSYTX W PT 30 MINUTES: CPT | Mod: 95 | Performed by: COUNSELOR

## 2024-04-19 NOTE — PROGRESS NOTES
[facility]                                     Progress Note    Patient Name: David Martin  Date: 4/19/24         Service Type: Individual      Session Start Time: 9:00 am  Session End Time: 9:31 am     Session Length: 31 minutes    Session #: 13    Attendees: Client attended alone    Service Modality:  Video Visit:      Provider verified identity through the following two step process.  Patient provided:  Patient is known previously to provider    Telemedicine Visit: The patient's condition can be safely assessed and treated via synchronous audio and visual telemedicine encounter.      Reason for Telemedicine Visit: Services only offered telehealth    Originating Site (Patient Location): Patient's home    Distant Site (Provider Location): Provider Remote Setting- Home Office    Consent:  The patient/guardian has verbally consented to: the potential risks and benefits of telemedicine (video visit) versus in person care; bill my insurance or make self-payment for services provided; and responsibility for payment of non-covered services.     Patient would like the video invitation sent by:  My Chart    Mode of Communication:  [facility]    Distant Location (Provider):  Off-site    As the provider I attest to compliance with applicable laws and regulations related to telemedicine.    DATA  Interactive Complexity: No  Crisis: No  Extended Session (53+ minutes): No         Progress Since Last Session (Related to Symptoms / Goals / Homework):   Symptoms: Improving pt overall feels that his symptoms have become reliably managable    Homework: Achieved / completed to satisfaction      Episode of Care Goals: Satisfactory progress - MAINTENANCE (Working to maintain change, with risk of relapse); Intervened by continuing to positively reinforce healthy behavior choice      Current / Ongoing Stressors and Concerns:   Pt is currently seeking therapy due to ongoing ADHD-like symptoms  (related to processing delay), and procrastination at work which results in anxiety around deadlines. Since last session, pt has continued to do well at work, with his strategies being effective, and was able to get his taxes done. Pt also got his MRI set up for his neck/spine pain too. Pt had some extra admin stressors related to his taxes and getting his MRI set-up, which he was happy to have been able to get done. Pt noticed that his overall procrastination has improved and he has more mental space to handle things better since his anxiety and ADHD-like symptoms have improved. Last week pt did good with his jogging and yet this week he hasn't done it at all. Pt processed through potential barriers to staying active (ie jogging). Pt reflect on how he can address these barriers and identified factors that can make it harder. Pt got good feedback at work and his improved work has been recognized by his boss, which he found to be very validating and affirming for the work he has been doing. Pt is doing better with sticking with things that interest him and planning for his future, including potential vacations, which has been helping him feel more fulfilled and less motivated to engage in escapism like he used to.      Treatment Objective(s) Addressed in This Session:   identify and use 3 strategies to improve organization  identify thoughts, triggers, and  stressors which contribute to feelings of anxiety  use cognitive strategies identified in therapy to challenge anxious thoughts  Identify negative self-talk and behaviors: challenge core beliefs, myths, and actions  Improve concentration, focus, and mindfulness in daily activities   identify two areas of life that you would like to have improved functioning  identify at least 3 adaptive coping statements to counteract negative thoughts     Intervention:   CBT: reviewed how routine building has been going with what has and hasn't been helping    Motivational  Interviewing    MI Intervention: Co-Developed Goal: trx plan updated, Expressed Empathy/Understanding, Supported Autonomy, Collaboration, Evocation, Open-ended questions, Reflections: simple and complex, Change talk (evoked), and Reframe     Change Talk Expressed by the Patient: Desire to change Ability to change Reasons to change Need to change Committment to change Activation Taking steps    Provider Response to Change Talk: E - Evoked more info from patient about behavior change, A - Affirmed patient's thoughts, decisions, or attempts at behavior change, R - Reflected patient's change talk, and S - Summarized patient's change talk statements    Psychodynamic: Processed through internal experiences related to how his anxiety feelings have changed since starting therapy  Solution Focused: Identify self-imposed barriers and possible strategies that he can use to overcome them    Assessments completed prior to visit:  PHQ2:       4/5/2024     8:48 AM 3/22/2024     8:54 AM 3/18/2024    12:57 PM 3/8/2024     9:00 AM 12/1/2023     9:03 AM 8/22/2023    12:48 PM 4/28/2023     9:24 AM   PHQ-2 ( 1999 [employer])   Q1: Little interest or pleasure in doing things 0 0 0 0 0 0 0   Q2: Feeling down, depressed or hopeless 0 0 0 0 0 0 0   PHQ-2 Score 0 0 0 0 0 0 0   Q1: Little interest or pleasure in doing things Not at all Not at all Not at all Not at all Not at all Not at all    Q2: Feeling down, depressed or hopeless Not at all Not at all Not at all Not at all Not at all Not at all    PHQ-2 Score 0 0 0 0 0 0      GAD2:       8/22/2023    12:56 PM 12/1/2023     9:03 AM 3/8/2024     9:00 AM 3/22/2024     8:54 AM 4/5/2024     8:48 AM   LUAN-2   Feeling nervous, anxious, or on edge 2 0 0 0 1   Not being able to stop or control worrying 1 0 0 0 0   LUAN-2 Total Score 3 0 0 0 1     GAD7:       8/22/2023    12:56 PM   LUAN-7 SCORE   Total Score 7 (mild anxiety)   Total Score 7     [place] Suicide Severity Rating Scale (Lifetime/Recent)       8/22/2023     1:39 PM   [place] Suicide Severity Rating (Lifetime/Recent)   Q1 Wish to be Dead (Lifetime) N   Q2 Non-Specific Active Suicidal Thoughts (Lifetime) N   Actual Attempt (Lifetime) N   Has subject engaged in non-suicidal self-injurious behavior? (Lifetime) N   Interrupted Attempts (Lifetime) N   Aborted or Self-Interrupted Attempt (Lifetime) N   Preparatory Acts or Behavior (Lifetime) N   Calculated C-SSRS Risk Score (Lifetime/Recent) No Risk Indicated         ASSESSMENT: Current Emotional / Mental Status (status of significant symptoms):   Risk status (Self / Other harm or suicidal ideation)   Patient denies current fears or concerns for personal safety.   Patient denies current or recent suicidal ideation or behaviors.   Patient denies current or recent homicidal ideation or behaviors.   Patient denies current or recent self injurious behavior or ideation.   Patient denies other safety concerns.   Patient reports there has been no change in risk factors since their last session.     Patient reports there has been no change in protective factors since their last session.     Recommended that patient call 911 or go to the local ED should there be a change in any of these risk factors.     Appearance:   Appropriate    Eye Contact:   Good    Psychomotor Behavior: Normal    Attitude:   Cooperative  Interested Friendly Pleasant   Orientation:   All   Speech    Rate / Production: Normal/ Responsive    Volume:  Normal    Mood:    Anxious  Normal   Affect:    Appropriate    Thought Content:  Clear    Thought Form:  Coherent  Logical    Insight:    Good  and Intellectual Insight     Medication Review:   No current psychiatric medications prescribed     Medication Compliance:   NA     Changes in Health Issues:   Yes: Pain, No Psychological Distress     Chemical Use Review:   Substance Use: Chemical use reviewed, no active concerns identified      Tobacco Use: No current tobacco use.      Diagnosis:  1.  "Generalized anxiety disorder    2. Attention deficit hyperactivity disorder (ADHD), other type        Collateral Reports Completed:   Not Applicable    PLAN: (Patient Tasks / Therapist Tasks / Other)  Continue with PT and stretching  Keep up with regular exercise routine   Practice running/working in \"silence\"   Look into running meditation (extra credit)    Yeyo Isbell, [facility]                                                         ______________________________________________________________________    Individual Treatment Plan    Patient's Name: David Martin  YOB: 1995    Date of Creation: 9/22/23  Date Treatment Plan Last Reviewed/Revised: 4/19/24    DSM5 Diagnoses: Attention-Deficit/Hyperactivity Disorder  314.01 (F90.9) Unspecified Attention -Deficit / Hyperactivity Disorder or 300.02 (F41.1) Generalized Anxiety Disorder  Psychosocial / Contextual Factors: Heterosexual, CIS male, , father of several young children, employed full time, hx of depression, and marital stressors  PROMIS (reviewed every 90 days):   PROMIS-10 Scores        12/15/2023     9:04 AM 1/12/2024     7:52 AM 1/25/2024     8:19 AM   PROMIS-10 Total Score w/o Sub Scores   PROMIS TOTAL - SUBSCORES 30 31 32       Referral / Collaboration:  Referral to another professional/service is not indicated at this time..    Anticipated number of session for this episode of care: 6-9 sessions  Anticipation frequency of session: Biweekly  Anticipated Duration of each session: 38-52 minutes  Treatment plan will be reviewed in 90 days or when goals have been changed.     Measurable Treatment Goal(s) related to diagnosis / functional impairment(s)  Goal 1: Patient will improve his ability to focus/concentrate and be better at not procrastinating to reduce his anxiety around deadlines.    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  "     Objective #A (Patient Action)    Patient will identify and use 3 strategies to improve organization  identify thoughts, triggers and  stressors which contribute to feelings of anxiety  use cognitive strategies identified in therapy to challenge anxious thoughts  learn at least 3 self-regulation strategies.  Status: Completed - Date: 4/19/24    Intervention(s)  Therapist will assign homework related to target objective with the intent to promote pt autonomy and better manage MH.  Facilitate increase in self-awareness  Provide psycho-education on organization and routine building  Teach/promote utilization of habit forming skills and coping skills    Objective #B Reduce avoidance and perfectionism  Patient will identify 3 strategies to more effectively address stressors  identify patterns of escalation  (i.e. tightness in chest, flushed face, increased heart rate, clenched hands, etc.)  identify at least 3 techniques for intervening on the escalation  Identify negative self-talk and behaviors: challenge core beliefs, myths, and actions  Improve concentration, focus, and mindfulness in daily activities   identify two areas of life that you would like to have improved functioning  identify at least 3 adaptive coping statements to counteract negative thoughts.  Status: Completed - Date: 1/12/24      Intervention(s)  Therapist will assign homework related to target objective with the intent to promote pt autonomy and better manage MH.  Facilitate increase in self-awareness  Provide psycho-education on anxiety-avoidance cycle  Teach/promote utilization of behavior modification    Goal 2: Patient will get more involved in his community or activities that foster feelings of authenticity by living his values or encouraging engagement in things that are important to him.    I will know I've met my goal when I feel like I am putting my money where my mouth is.      Objective #A (Patient Action)    Status: Continued -  Date(s): 4/19/24    Patient will identify and compliment positives at least 3 times daily to support positive self-image  participate in community activities to improve mood  attend and participate in social or recreational activities consistently once per week  practice one mindfulness skill each day for 5-10 minutes  use positive self-affirmations daily  identify at least 3 self-care activities.    Intervention(s)  Therapist will assign homework related to target objective with the intent to promote pt autonomy and better manage MH.  Facilitate increase in self-awareness  Provide psycho-education on positive self-talk and internal motivation development  Teach/promote utilization of narrative therapy techniques     Patient has reviewed and agreed to the above plan.      Yeyo Isbell, [facility]  September 22, 2023

## 2024-05-03 ENCOUNTER — OFFICE VISIT (OUTPATIENT)
Dept: PHYSICAL MEDICINE AND REHAB | Facility: CLINIC | Age: 29
End: 2024-05-03
Payer: COMMERCIAL

## 2024-05-03 ENCOUNTER — VIRTUAL VISIT (OUTPATIENT)
Dept: PSYCHOLOGY | Facility: CLINIC | Age: 29
End: 2024-05-03
Payer: COMMERCIAL

## 2024-05-03 VITALS — SYSTOLIC BLOOD PRESSURE: 140 MMHG | OXYGEN SATURATION: 99 % | HEART RATE: 61 BPM | DIASTOLIC BLOOD PRESSURE: 83 MMHG

## 2024-05-03 DIAGNOSIS — M47.812 CERVICAL SPONDYLOSIS WITHOUT MYELOPATHY: ICD-10-CM

## 2024-05-03 DIAGNOSIS — M79.601 PAIN OF RIGHT UPPER EXTREMITY: Primary | ICD-10-CM

## 2024-05-03 DIAGNOSIS — F90.8 ATTENTION DEFICIT HYPERACTIVITY DISORDER (ADHD), OTHER TYPE: Primary | ICD-10-CM

## 2024-05-03 DIAGNOSIS — M79.18 MYOFASCIAL PAIN: ICD-10-CM

## 2024-05-03 DIAGNOSIS — F41.1 GENERALIZED ANXIETY DISORDER: ICD-10-CM

## 2024-05-03 PROCEDURE — 90834 PSYTX W PT 45 MINUTES: CPT | Mod: 95 | Performed by: COUNSELOR

## 2024-05-03 PROCEDURE — 99213 OFFICE O/P EST LOW 20 MIN: CPT | Performed by: PAIN MEDICINE

## 2024-05-03 ASSESSMENT — PAIN SCALES - GENERAL: PAINLEVEL: MILD PAIN (2)

## 2024-05-03 NOTE — PROGRESS NOTES
[facility]                                     Progress Note    Patient Name: David Martin  Date: 5/03/24         Service Type: Individual      Session Start Time: 9:01 am  Session End Time: 9:52 am     Session Length: 51 minutes    Session #: 14    Attendees: Client attended alone    Service Modality:  Video Visit:      Provider verified identity through the following two step process.  Patient provided:  Patient is known previously to provider    Telemedicine Visit: The patient's condition can be safely assessed and treated via synchronous audio and visual telemedicine encounter.      Reason for Telemedicine Visit: Services only offered telehealth    Originating Site (Patient Location): Patient's home    Distant Site (Provider Location): Provider Remote Setting- Home Office    Consent:  The patient/guardian has verbally consented to: the potential risks and benefits of telemedicine (video visit) versus in person care; bill my insurance or make self-payment for services provided; and responsibility for payment of non-covered services.     Patient would like the video invitation sent by:  My Chart    Mode of Communication:  [facility]    Distant Location (Provider):  Off-site    As the provider I attest to compliance with applicable laws and regulations related to telemedicine.    DATA  Interactive Complexity: No  Crisis: No  Extended Session (53+ minutes): No         Progress Since Last Session (Related to Symptoms / Goals / Homework):   Symptoms: Improving pt endorses continued gains    Homework: Achieved / completed to satisfaction      Episode of Care Goals: Satisfactory progress - ACTION (Actively working towards change); Intervened by reinforcing change plan / affirming steps taken     Current / Ongoing Stressors and Concerns:   Pt is currently seeking therapy due to ongoing ADHD-like symptoms (related to processing delay), and procrastination at work which results in  anxiety around deadlines. Since last session, there have been no big developments and had a good performance review at work, which was a confidence booster. 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 Pt reflected that a year ago he would have been terrified with just the idea of doing this, and is thinking that his ability to regulate his emotions at work as well as his increased sense of confidence is a big part of this. Pt has continued to use his coping skills around trip planning and is started to feel excited for his upcoming trip to the [facility]. 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 Pt has started taking up gardening for this summer and is feeling that he has the mental space to now to focus on doing all these things outside of work. Pt has been going for a run 4-5 days a week consistently and keeping up with his PT for his neck/back. Pt noted that the PT has helped somewhat, but it hasn't fully resolved the pain, so is hopping that he will be able to get an MRI to figure out what is going on. Pt is also feeling more secure around finances and upon reflection is able to see how 2 months ago he was in a very different place before he started to be more mindful of his financial situation and spending which has felt really good.      Treatment Objective(s) Addressed in This Session:   identify and use 3 strategies to improve organization  identify thoughts, triggers, and  stressors which contribute to feelings of anxiety  use cognitive strategies identified in therapy to challenge anxious thoughts  Identify negative self-talk and behaviors: challenge core beliefs, myths, and actions  Improve concentration, focus, and mindfulness in  daily activities   identify two areas of life that you would like to have improved functioning  identify at least 3 adaptive coping statements to counteract negative thoughts     Intervention:   CBT: reviewed how routine building has been going with what has and hasn't been helping    Motivational Interviewing    MI Intervention: Co-Developed Goal: trx plan updated, Expressed Empathy/Understanding, Supported Autonomy, Collaboration, Evocation, Open-ended questions, Reflections: simple and complex, Change talk (evoked), and Reframe     Change Talk Expressed by the Patient: Desire to change Ability to change Reasons to change Need to change Committment to change Activation Taking steps    Provider Response to Change Talk: E - Evoked more info from patient about behavior change, A - Affirmed patient's thoughts, decisions, or attempts at behavior change, R - Reflected patient's change talk, and S - Summarized patient's change talk statements    Psychodynamic: Processed through internal experiences related to how his anxiety feelings have changed since starting therapy  Solution Focused: Identify self-imposed barriers and possible strategies that he can use to overcome them    Assessments completed prior to visit:  PHQ2:       4/5/2024     8:48 AM 3/22/2024     8:54 AM 3/18/2024    12:57 PM 3/8/2024     9:00 AM 12/1/2023     9:03 AM 8/22/2023    12:48 PM 4/28/2023     9:24 AM   PHQ-2 ( 1999 [employer])   Q1: Little interest or pleasure in doing things 0 0 0 0 0 0 0   Q2: Feeling down, depressed or hopeless 0 0 0 0 0 0 0   PHQ-2 Score 0 0 0 0 0 0 0   Q1: Little interest or pleasure in doing things Not at all Not at all Not at all Not at all Not at all Not at all    Q2: Feeling down, depressed or hopeless Not at all Not at all Not at all Not at all Not at all Not at all    PHQ-2 Score 0 0 0 0 0 0      GAD2:       8/22/2023    12:56 PM 12/1/2023     9:03 AM 3/8/2024     9:00 AM 3/22/2024     8:54 AM 4/5/2024     8:48 AM   LUAN-2    Feeling nervous, anxious, or on edge 2 0 0 0 1   Not being able to stop or control worrying 1 0 0 0 0   LUAN-2 Total Score 3 0 0 0 1     GAD7:       8/22/2023    12:56 PM   LUAN-7 SCORE   Total Score 7 (mild anxiety)   Total Score 7     Reeder Suicide Severity Rating Scale (Lifetime/Recent)      8/22/2023     1:39 PM   Reeder Suicide Severity Rating (Lifetime/Recent)   Q1 Wish to be Dead (Lifetime) N   Q2 Non-Specific Active Suicidal Thoughts (Lifetime) N   Actual Attempt (Lifetime) N   Has subject engaged in non-suicidal self-injurious behavior? (Lifetime) N   Interrupted Attempts (Lifetime) N   Aborted or Self-Interrupted Attempt (Lifetime) N   Preparatory Acts or Behavior (Lifetime) N   Calculated C-SSRS Risk Score (Lifetime/Recent) No Risk Indicated         ASSESSMENT: Current Emotional / Mental Status (status of significant symptoms):   Risk status (Self / Other harm or suicidal ideation)   Patient denies current fears or concerns for personal safety.   Patient denies current or recent suicidal ideation or behaviors.   Patient denies current or recent homicidal ideation or behaviors.   Patient denies current or recent self injurious behavior or ideation.   Patient denies other safety concerns.   Patient reports there has been no change in risk factors since their last session.     Patient reports there has been no change in protective factors since their last session.     Recommended that patient call 911 or go to the local ED should there be a change in any of these risk factors.     Appearance:   Appropriate    Eye Contact:   Good    Psychomotor Behavior: Normal    Attitude:   Cooperative  Interested Friendly Pleasant   Orientation:   All   Speech    Rate / Production: Normal/ Responsive    Volume:  Normal    Mood:    Anxious  Normal   Affect:    Appropriate    Thought Content:  Clear    Thought Form:  Coherent  Logical    Insight:    Good  and Intellectual Insight     Medication Review:   No current  psychiatric medications prescribed     Medication Compliance:   NA     Changes in Health Issues:   Yes: Pain, No Psychological Distress     Chemical Use Review:   Substance Use: Chemical use reviewed, no active concerns identified      Tobacco Use: No current tobacco use.      Diagnosis:  1. Attention deficit hyperactivity disorder (ADHD), other type    2. Generalized anxiety disorder        Collateral Reports Completed:   Not Applicable    PLAN: (Patient Tasks / Therapist Tasks / Other)  Continue with PT and stretching  Follow-up with MRI on pain stuff  Keep up with regular exercise routine   Maintain routine and habits developed in therapy over the next month    Yeyo Isbell [facility]                                                         ______________________________________________________________________    Individual Treatment Plan    Patient's Name: David Martin  YOB: 1995    Date of Creation: 9/22/23  Date Treatment Plan Last Reviewed/Revised: 4/19/24    DSM5 Diagnoses: Attention-Deficit/Hyperactivity Disorder  314.01 (F90.9) Unspecified Attention -Deficit / Hyperactivity Disorder or 300.02 (F41.1) Generalized Anxiety Disorder  Psychosocial / Contextual Factors: Heterosexual, CIS male, , father of several young children, employed full time, hx of depression, and marital stressors  PROMIS (reviewed every 90 days):   PROMIS-10 Scores        12/15/2023     9:04 AM 1/12/2024     7:52 AM 1/25/2024     8:19 AM   PROMIS-10 Total Score w/o Sub Scores   PROMIS TOTAL - SUBSCORES 30 31 32       Referral / Collaboration:  Referral to another professional/service is not indicated at this time..    Anticipated number of session for this episode of care: 6-9 sessions  Anticipation frequency of session: Biweekly  Anticipated Duration of each session: 38-52 minutes  Treatment plan will be reviewed in 90 days or when goals have been changed.     Measurable Treatment Goal(s) related to diagnosis /  functional impairment(s)  Goal 1: Patient will improve his ability to focus/concentrate and be better at not procrastinating to reduce his anxiety around deadlines.    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      Objective #A (Patient Action)    Patient will identify and use 3 strategies to improve organization  identify thoughts, triggers and  stressors which contribute to feelings of anxiety  use cognitive strategies identified in therapy to challenge anxious thoughts  learn at least 3 self-regulation strategies.  Status: Completed - Date: 4/19/24    Intervention(s)  Therapist will assign homework related to target objective with the intent to promote pt autonomy and better manage MH.  Facilitate increase in self-awareness  Provide psycho-education on organization and routine building  Teach/promote utilization of habit forming skills and coping skills    Objective #B Reduce avoidance and perfectionism  Patient will identify 3 strategies to more effectively address stressors  identify patterns of escalation  (i.e. tightness in chest, flushed face, increased heart rate, clenched hands, etc.)  identify at least 3 techniques for intervening on the escalation  Identify negative self-talk and behaviors: challenge core beliefs, myths, and actions  Improve concentration, focus, and mindfulness in daily activities   identify two areas of life that you would like to have improved functioning  identify at least 3 adaptive coping statements to counteract negative thoughts.  Status: Completed - Date: 1/12/24      Intervention(s)  Therapist will assign homework related to target objective with the intent to promote pt autonomy and better manage MH.  Facilitate increase in self-awareness  Provide psycho-education on anxiety-avoidance cycle  Teach/promote utilization of behavior modification    Goal 2: Patient will get more involved in  his community or activities that foster feelings of authenticity by living his values or encouraging engagement in things that are important to him.    I will know I've met my goal when I feel like I am putting my money where my mouth is.      Objective #A (Patient Action)    Status: Continued - Date(s): 4/19/24    Patient will identify and compliment positives at least 3 times daily to support positive self-image  participate in community activities to improve mood  attend and participate in social or recreational activities consistently once per week  practice one mindfulness skill each day for 5-10 minutes  use positive self-affirmations daily  identify at least 3 self-care activities.    Intervention(s)  Therapist will assign homework related to target objective with the intent to promote pt autonomy and better manage MH.  Facilitate increase in self-awareness  Provide psycho-education on positive self-talk and internal motivation development  Teach/promote utilization of narrative therapy techniques     Patient has reviewed and agreed to the above plan.      Yeyo Isbell, [facility]  September 22, 2023

## 2024-05-03 NOTE — PROGRESS NOTES
Assessment:     Diagnoses and all orders for this visit:  Pain of right upper extremity  -     MR Cervical Spine w/o Contrast; Future  Myofascial pain  -     MR Cervical Spine w/o Contrast; Future  Cervical spondylosis without myelopathy  -     MR Cervical Spine w/o Contrast; Future     David Martin is a 29 year old y.o. male with past medical history significant for asthma, obesity, chronic idiopathic gout, hyperlipidemia, hyperuricemia, elevated calcium, hypertension, disturbed concentration who presents today for follow-up regarding neck and right upper extremity pain:     -Patient continues to have neck and right upper extremity pain despite physical therapy.  Patient has ongoing pain despite conservative management including physical therapy.  Patient has had pain since the beginning to middle of February without resolution.  I reordered a MRI of the cervical spine to further evaluate.    Plan:     A shared decision making plan was used.  The patient's values and choices were respected. Prior medical records from our last visit on 4/3/2024 were reviewed today. The following represents what was discussed and decided upon by the provider and the patient.     -DIAGNOSTIC TESTS: Images were personally reviewed and interpreted.   --X-ray of the cervical spine dated 3/12/2024 is personally reviewed images interpreted and discussed with patient. There are mild spondylitic changes throughout the cervical spine with mild loss of disc height space on C5-6 with mild facet arthropathy throughout.   --We ordered MRI of cervical spine to further evaluate.     -INTERVENTIONS: No interventions at this time.    -MEDICATIONS: No changes to medications.  -  Discussed side effects of medications and proper use. Patient verbalized understanding.    -PHYSICAL THERAPY: He continues with physical therapy and recommend that he does this    -PATIENT EDUCATION: We discussed pain management in a multiple fashion including physical  therapy, medication management, reasoning for MRI.    -FOLLOW UP: Patient will follow-up after  Advised to contact clinic if symptoms worsen or change.    Subjective:     David Martin is a 29 year old male who presents today for follow-up regarding patient's neck and right upper extremity pain.  Patient notes that although he does have temporary relief with physical therapy with some of the exercises.  He does have increased pain with others.  He continues to have discomfort in his neck and right upper extremity.  His pain today is 2/10 its worst is 5/10 as best as 1/10.  Pain is improved somewhat with rest and good positioning as well as heat and ice and doing light stretching.  Physical therapy does cause worsening of pain.  Pain is achy in nature and at times sharp.  He does note that he is able to do tasks that he would like to, however does have discomfort with them.  He denies any bowel or bladder changes, fevers, chills, unintentional weight loss.    No myelopathic symptoms.     Evaluation to Date: X-ray of cervical spine dated 3/12/2024.     Treatment to Date: Chiropractic care.  Physical therapy.    Patient Active Problem List   Diagnosis    Class 2 severe obesity due to excess calories with serious comorbidity and body mass index (BMI) of 35.0 to 35.9 in adult (H)    Moderate persistent asthma without complication    Chronic idiopathic gout    Family history of gout    Hypertension, unspecified type    Dyslipidemia    Multiple pigmented nevi    Family history of skin cancer    Disturbed concentration    Hyperuricemia    Serum calcium elevated    Cervical pain       Current Outpatient Medications   Medication Sig Dispense Refill    allopurinol (ZYLOPRIM) 300 MG tablet Take 1 tablet (300 mg) by mouth daily 90 tablet 0    fluticasone-salmeterol (ADVAIR DISKUS) 250-50 MCG/ACT inhaler Inhale 1 puff into the lungs every 12 hours 1 each 3    gabapentin (NEURONTIN) 300 MG capsule Take 3 capsules (900 mg) by  mouth 3 times daily Start taking this medication at bedtime and then follow chart of how I want you to increase this. 270 capsule 1    lisinopril (ZESTRIL) 10 MG tablet Take 1 tablet (10 mg) by mouth daily 30 tablet 1     No current facility-administered medications for this visit.       Allergies   Allergen Reactions    Minocycline Rash     GIVEN FOR ACNE WHEN A TEENAGER AND BROKE OUT IN [place]       Past Medical History:   Diagnosis Date    Chronic idiopathic gout involving toe without tophus     dx age 22 toe swleling,    Elevated blood pressure reading without diagnosis of hypertension 1/24/2022    Hypertension, unspecified type 4/28/2023    Moderate persistent asthma without complication     dx in middle school        Review of Systems  ROS:  Specifically negative for bowel/bladder dysfunction, balance changes, headache, dizziness, foot drop, fevers, chills, appetite changes, nausea/vomiting, unexplained weight loss. Otherwise 13 systems reviewed are negative. Please see the patient's intake questionnaire from today for details.    Reviewed Social, Family, Past Medical and Past Surgical history with patient, no significant changes noted since prior visit.     Objective:     BP (!) 140/83   Pulse 61   SpO2 99%     PHYSICAL EXAMINATION:   --CONSTITUTIONAL: Vital signs as above. No acute distress. The patient is well nourished and well groomed.  --PSYCHIATRIC:  The patient is awake, alert, oriented to person, place, time and answering questions appropriately with clear speech. Appropriate mood and affect   --HEENT: Sclera are non-injected.   --SKIN: Skin over the face is clean, dry, intact without rashes.  --RESPIRATORY: Normal rhythm and effort. No abnormal accessory muscle breathing patterns noted.   --GROSS MOTOR: Easily arises from a seated position.   --CERVICAL SPINE: Inspection reveals no evidence of deformity. Range of motion is mildly limited in cervical flexion, extension, lateral rotation.  Tenderness  palpation in the cervical paraspinal muscles.   --UPPER EXTREMITY MOTOR TESTING:  Wrist flexion left 5/5, right 5/5  Wrist extension left 5/5, right 5/5  Pronators left 5/5, right 5/5  Biceps left 5/5, right 5/5   Triceps left 5/5, right 5/5   Shoulder abduction left 5/5, right 5/5   left 5/5, right 5/5  --NEUROLOGIC:  Sensation to upper extremities is intact. Negative Boo's bilaterally.   --VASCULAR: Warm upper limbs bilaterally.     Imaging of the cervical spine: Cervical spine imaging was reviewed today.

## 2024-05-03 NOTE — LETTER
5/3/2024         RE: David Martin  [id number]  [place] [address]        Dear Colleague,    Thank you for referring your patient, David Martin, to the [facility]. Please see a copy of my visit note below.      Assessment:     Diagnoses and all orders for this visit:  Pain of right upper extremity  -     MR Cervical Spine w/o Contrast; Future  Myofascial pain  -     MR Cervical Spine w/o Contrast; Future  Cervical spondylosis without myelopathy  -     MR Cervical Spine w/o Contrast; Future     David Martin is a 29 year old y.o. male with past medical history significant for asthma, obesity, chronic idiopathic gout, hyperlipidemia, hyperuricemia, elevated calcium, hypertension, disturbed concentration who presents today for follow-up regarding neck and right upper extremity pain:     -Patient continues to have neck and right upper extremity pain despite physical therapy.  Patient has ongoing pain despite conservative management including physical therapy.  Patient has had pain since the beginning to middle of February without resolution.  I reordered a MRI of the cervical spine to further evaluate.    Plan:     A shared decision making plan was used.  The patient's values and choices were respected. Prior medical records from our last visit on 4/3/2024 were reviewed today. The following represents what was discussed and decided upon by the provider and the patient.     -DIAGNOSTIC TESTS: Images were personally reviewed and interpreted.   --X-ray of the cervical spine dated 3/12/2024 is personally reviewed images interpreted and discussed with patient. There are mild spondylitic changes throughout the cervical spine with mild loss of disc height space on C5-6 with mild facet arthropathy throughout.   --We ordered MRI of cervical spine to further evaluate.     -INTERVENTIONS: No interventions at this time.    -MEDICATIONS: No changes to medications.  -  Discussed  side effects of medications and proper use. Patient verbalized understanding.    -PHYSICAL THERAPY: He continues with physical therapy and recommend that he does this    -PATIENT EDUCATION: We discussed pain management in a multiple fashion including physical therapy, medication management, reasoning for MRI.    -FOLLOW UP: Patient will follow-up after  Advised to contact clinic if symptoms worsen or change.    Subjective:     David Martin is a 29 year old male who presents today for follow-up regarding patient's neck and right upper extremity pain.  Patient notes that although he does have temporary relief with physical therapy with some of the exercises.  He does have increased pain with others.  He continues to have discomfort in his neck and right upper extremity.  His pain today is 2/10 its worst is 5/10 as best as 1/10.  Pain is improved somewhat with rest and good positioning as well as heat and ice and doing light stretching.  Physical therapy does cause worsening of pain.  Pain is achy in nature and at times sharp.  He does note that he is able to do tasks that he would like to, however does have discomfort with them.  He denies any bowel or bladder changes, fevers, chills, unintentional weight loss.    No myelopathic symptoms.     Evaluation to Date: X-ray of cervical spine dated 3/12/2024.     Treatment to Date: Chiropractic care.  Physical therapy.    Patient Active Problem List   Diagnosis     Class 2 severe obesity due to excess calories with serious comorbidity and body mass index (BMI) of 35.0 to 35.9 in adult (H)     Moderate persistent asthma without complication     Chronic idiopathic gout     Family history of gout     Hypertension, unspecified type     Dyslipidemia     Multiple pigmented nevi     Family history of skin cancer     Disturbed concentration     Hyperuricemia     Serum calcium elevated     Cervical pain       Current Outpatient Medications   Medication Sig Dispense Refill      allopurinol (ZYLOPRIM) 300 MG tablet Take 1 tablet (300 mg) by mouth daily 90 tablet 0     fluticasone-salmeterol (ADVAIR DISKUS) 250-50 MCG/ACT inhaler Inhale 1 puff into the lungs every 12 hours 1 each 3     gabapentin (NEURONTIN) 300 MG capsule Take 3 capsules (900 mg) by mouth 3 times daily Start taking this medication at bedtime and then follow chart of how I want you to increase this. 270 capsule 1     lisinopril (ZESTRIL) 10 MG tablet Take 1 tablet (10 mg) by mouth daily 30 tablet 1     No current facility-administered medications for this visit.       Allergies   Allergen Reactions     Minocycline Rash     GIVEN FOR ACNE WHEN A TEENAGER AND BROKE OUT IN [place]       Past Medical History:   Diagnosis Date     Chronic idiopathic gout involving toe without tophus     dx age 22 toe swleling,     Elevated blood pressure reading without diagnosis of hypertension 1/24/2022     Hypertension, unspecified type 4/28/2023     Moderate persistent asthma without complication     dx in middle school        Review of Systems  ROS:  Specifically negative for bowel/bladder dysfunction, balance changes, headache, dizziness, foot drop, fevers, chills, appetite changes, nausea/vomiting, unexplained weight loss. Otherwise 13 systems reviewed are negative. Please see the patient's intake questionnaire from today for details.    Reviewed Social, Family, Past Medical and Past Surgical history with patient, no significant changes noted since prior visit.     Objective:     BP (!) 140/83   Pulse 61   SpO2 99%     PHYSICAL EXAMINATION:   --CONSTITUTIONAL: Vital signs as above. No acute distress. The patient is well nourished and well groomed.  --PSYCHIATRIC:  The patient is awake, alert, oriented to person, place, time and answering questions appropriately with clear speech. Appropriate mood and affect   --HEENT: Sclera are non-injected.   --SKIN: Skin over the face is clean, dry, intact without rashes.  --RESPIRATORY: Normal rhythm  and effort. No abnormal accessory muscle breathing patterns noted.   --GROSS MOTOR: Easily arises from a seated position.   --CERVICAL SPINE: Inspection reveals no evidence of deformity. Range of motion is mildly limited in cervical flexion, extension, lateral rotation.  Tenderness palpation in the cervical paraspinal muscles.   --UPPER EXTREMITY MOTOR TESTING:  Wrist flexion left 5/5, right 5/5  Wrist extension left 5/5, right 5/5  Pronators left 5/5, right 5/5  Biceps left 5/5, right 5/5   Triceps left 5/5, right 5/5   Shoulder abduction left 5/5, right 5/5   left 5/5, right 5/5  --NEUROLOGIC:  Sensation to upper extremities is intact. Negative Boo's bilaterally.   --VASCULAR: Warm upper limbs bilaterally.     Imaging of the cervical spine: Cervical spine imaging was reviewed today.                 Again, thank you for allowing me to participate in the care of your patient.        Sincerely,        Rayshawn Nieto, DO

## 2024-05-11 DIAGNOSIS — M79.601 PAIN OF RIGHT UPPER EXTREMITY: ICD-10-CM

## 2024-05-11 DIAGNOSIS — M79.18 MYOFASCIAL PAIN: ICD-10-CM

## 2024-05-11 DIAGNOSIS — M54.2 CERVICALGIA: ICD-10-CM

## 2024-05-13 RX ORDER — GABAPENTIN 300 MG/1
900 CAPSULE ORAL 3 TIMES DAILY
Qty: 270 CAPSULE | Refills: 1 | Status: SHIPPED | OUTPATIENT
Start: 2024-05-13

## 2024-05-17 ENCOUNTER — THERAPY VISIT (OUTPATIENT)
Dept: PHYSICAL THERAPY | Facility: CLINIC | Age: 29
End: 2024-05-17
Payer: COMMERCIAL

## 2024-05-17 DIAGNOSIS — M54.2 CERVICAL PAIN: Primary | ICD-10-CM

## 2024-05-17 PROCEDURE — 97110 THERAPEUTIC EXERCISES: CPT | Mod: GP | Performed by: PHYSICAL THERAPIST

## 2024-05-24 DIAGNOSIS — I10 HYPERTENSION, UNSPECIFIED TYPE: ICD-10-CM

## 2024-05-24 RX ORDER — LISINOPRIL 10 MG/1
10 TABLET ORAL DAILY
Qty: 30 TABLET | Refills: 0 | Status: SHIPPED | OUTPATIENT
Start: 2024-05-24 | End: 2024-07-02

## 2024-05-24 NOTE — TELEPHONE ENCOUNTER
Please note when seen in March and started on medication was advised to start lisinopril 10 mg  daily, change posture slowly till get used to lower BP. Stay well hydrated, monitor for a dry hacky cough. Nurse apt with bmp in 3 weeks for BP check   Apt with me end of may for BP   No appointment made with the nurse no labs done for medication monitoring.  Appointment for blood pressure recheck now not till June.  Can only give 30 pills for now until can recheck labs kidney function assess dosing at follow-up visit and go from there

## 2024-05-24 NOTE — TELEPHONE ENCOUNTER
[employer] message sent to patient.  Roseann BRYANT BSN, PHN, RN  [facility]  [phone]

## 2024-05-31 ENCOUNTER — DOCUMENTATION ONLY (OUTPATIENT)
Dept: BEHAVIORAL HEALTH | Facility: HOSPITAL | Age: 29
End: 2024-05-31
Payer: COMMERCIAL

## 2024-05-31 NOTE — PROGRESS NOTES
Therapist (writer) entered the virtual session a the scheduled time of the appointment. Pt did not arrive on time and therapist called pt about 10 minutes into the appointment, leaving a VM prompting them to join via [employer] or call the office or reach out through [employer] if they were having an issue. Therapist encouraged pt to call to schedule further follow-up, providing intake number, as they didn't have any more appointments at this time if they were unable to make this appointment today. Therapist waited an addition 10-15 minutes for pt to join and when they did not the session was cancelled.

## 2024-06-17 DIAGNOSIS — E79.0 HYPERURICEMIA: ICD-10-CM

## 2024-06-17 DIAGNOSIS — M1A.00X0 CHRONIC IDIOPATHIC GOUT: ICD-10-CM

## 2024-06-17 RX ORDER — ALLOPURINOL 300 MG/1
300 TABLET ORAL DAILY
Qty: 30 TABLET | Refills: 0 | Status: SHIPPED | OUTPATIENT
Start: 2024-06-17 | End: 2024-08-19

## 2024-06-21 ENCOUNTER — LAB (OUTPATIENT)
Dept: LAB | Facility: CLINIC | Age: 29
End: 2024-06-21
Payer: COMMERCIAL

## 2024-06-21 DIAGNOSIS — I10 HYPERTENSION, UNSPECIFIED TYPE: ICD-10-CM

## 2024-06-21 DIAGNOSIS — E83.52 SERUM CALCIUM ELEVATED: ICD-10-CM

## 2024-06-21 DIAGNOSIS — E79.0 HYPERURICEMIA: ICD-10-CM

## 2024-06-21 LAB
ANION GAP SERPL CALCULATED.3IONS-SCNC: 12 MMOL/L (ref 7–15)
BUN SERPL-MCNC: 10.8 MG/DL (ref 6–20)
CA-I BLD-MCNC: 4.9 MG/DL (ref 4.4–5.2)
CALCIUM SERPL-MCNC: 10.1 MG/DL (ref 8.6–10)
CHLORIDE SERPL-SCNC: 101 MMOL/L (ref 98–107)
CREAT SERPL-MCNC: 0.86 MG/DL (ref 0.67–1.17)
DEPRECATED HCO3 PLAS-SCNC: 24 MMOL/L (ref 22–29)
EGFRCR SERPLBLD CKD-EPI 2021: >90 ML/MIN/1.73M2
GLUCOSE SERPL-MCNC: 95 MG/DL (ref 70–99)
POTASSIUM SERPL-SCNC: 4.3 MMOL/L (ref 3.4–5.3)
PTH-INTACT SERPL-MCNC: 10 PG/ML (ref 15–65)
SODIUM SERPL-SCNC: 137 MMOL/L (ref 135–145)
URATE SERPL-MCNC: 7.8 MG/DL (ref 3.4–7)
VIT D+METAB SERPL-MCNC: 24 NG/ML (ref 20–50)

## 2024-06-21 PROCEDURE — 82330 ASSAY OF CALCIUM: CPT

## 2024-06-21 PROCEDURE — 84550 ASSAY OF BLOOD/URIC ACID: CPT

## 2024-06-21 PROCEDURE — 36415 COLL VENOUS BLD VENIPUNCTURE: CPT

## 2024-06-21 PROCEDURE — 83970 ASSAY OF PARATHORMONE: CPT

## 2024-06-21 PROCEDURE — 82306 VITAMIN D 25 HYDROXY: CPT

## 2024-06-21 PROCEDURE — 80048 BASIC METABOLIC PNL TOTAL CA: CPT

## 2024-06-21 NOTE — RESULT ENCOUNTER NOTE
Hello -    Here are my comments about your recent results:  So far ionized calcium is normal     Please let us know if you have any questions or concerns.     Regards,  Mariana Sherman MD

## 2024-06-22 NOTE — RESULT ENCOUNTER NOTE
Hello -    Here are my comments about your recent results:  Calcium remains mildly elevated with normal vit d and low normal pth & normal ionized calcium  Rest of lytes wnl  Uric acid improved but still elevated  Continue BP med for now may have to increase liisnopril if BP not goal  May have to increase allopurinol if having gout attacks  Will discuss at apt   May benefit from econsult with endocrine ( costs about 125 $ if not covered by insurance but saves you an inperson visit.)  If they rather see you they wont charge for the e consult  Let me know if you are agreeable to getting this done\    Please let us know if you have any questions or concerns.     Regards,  Mariana Sherman MD

## 2024-07-01 ENCOUNTER — HOSPITAL ENCOUNTER (OUTPATIENT)
Dept: MRI IMAGING | Facility: CLINIC | Age: 29
Discharge: HOME | End: 2024-07-01
Attending: PAIN MEDICINE | Admitting: PAIN MEDICINE
Payer: COMMERCIAL

## 2024-07-01 DIAGNOSIS — M79.601 PAIN OF RIGHT UPPER EXTREMITY: ICD-10-CM

## 2024-07-01 DIAGNOSIS — M47.812 CERVICAL SPONDYLOSIS WITHOUT MYELOPATHY: ICD-10-CM

## 2024-07-01 DIAGNOSIS — M79.18 MYOFASCIAL PAIN: ICD-10-CM

## 2024-07-01 PROCEDURE — 72141 MRI NECK SPINE W/O DYE: CPT | Mod: 26 | Performed by: RADIOLOGY

## 2024-07-01 PROCEDURE — 72141 MRI NECK SPINE W/O DYE: CPT

## 2024-07-02 DIAGNOSIS — I10 HYPERTENSION, UNSPECIFIED TYPE: ICD-10-CM

## 2024-07-02 RX ORDER — LISINOPRIL 10 MG/1
10 TABLET ORAL DAILY
Qty: 45 TABLET | Refills: 0 | Status: SHIPPED | OUTPATIENT
Start: 2024-07-02 | End: 2024-08-19

## 2024-07-08 ENCOUNTER — TELEPHONE (OUTPATIENT)
Dept: PHYSICAL MEDICINE AND REHAB | Facility: CLINIC | Age: 29
End: 2024-07-08
Payer: COMMERCIAL

## 2024-07-08 NOTE — TELEPHONE ENCOUNTER
"Per unread [email or web address] message sent to patient by Marilyn Lee PA-C on 7/1/24: \"I am covering for Dr. Nieto while he is out of the office.  Your MRI shows a disc protrusion at C5/6 causing mild narrowing around the nerves at that segment of your spine.  I recommend that you schedule a follow up visit with Dr Nieto to review the results and discuss treatment options.\"     Phone call to patient to review results and covering provider's recommendations. Left message to return call.   "

## 2024-07-12 ENCOUNTER — THERAPY VISIT (OUTPATIENT)
Dept: PHYSICAL THERAPY | Facility: CLINIC | Age: 29
End: 2024-07-12
Payer: COMMERCIAL

## 2024-07-12 DIAGNOSIS — M54.2 CERVICAL PAIN: Primary | ICD-10-CM

## 2024-07-12 PROCEDURE — 97110 THERAPEUTIC EXERCISES: CPT | Mod: GP | Performed by: PHYSICAL THERAPIST

## 2024-07-12 NOTE — PROGRESS NOTES
S:  Had MRI, awaiting to meet with MD to talk about results. Sxs have remained similar since last visit. Occasional pain with head movement in bed. No pain with working, fishing, chores. Sxs appear to be manageable.   O:   Cervical ROM  flex/ext full and min soreness at endrange flex, lat flex to right-mild sxs at endrange, mild sxs at endrange right rotation, chin tuck min sxs at endrange   Cervical myotomes 5/5 all levels and no sxs  A:  Pt has a comprehensive HEP.  Overall sx persistance that is annoyingly manageable.  Pt is going to meet with MD to review MRI findings and see if anything additional needed tor can be done to get over final hump.   P: cont self directed

## 2024-07-25 NOTE — TELEPHONE ENCOUNTER
No response from patient. Second call to discuss results and recommendations. Left message to return call.

## 2024-07-30 ENCOUNTER — MYC MEDICAL ADVICE (OUTPATIENT)
Dept: FAMILY MEDICINE | Facility: CLINIC | Age: 29
End: 2024-07-30
Payer: COMMERCIAL

## 2024-07-30 ENCOUNTER — NURSE TRIAGE (OUTPATIENT)
Dept: FAMILY MEDICINE | Facility: CLINIC | Age: 29
End: 2024-07-30
Payer: COMMERCIAL

## 2024-07-30 NOTE — TELEPHONE ENCOUNTER
Call received from patient:  Tested positive for COVID-19 this morning  This is second or third covid infection  Symptoms started last night  Symptoms: sore throat, headache, runny nose, muscle aches, a little diarrhea, chills  No chest pain, difficulty breathing nor SOB  No loss of taste nor smell  Afebrile  No history of heart disease  History of asthma    RN COVID TREATMENT VISIT  07/30/24    The patient has been triaged and does not require a higher level of care.    David HSIEH Martin  29 year old  Current weight? 270 lbs    Has the patient been seen by a primary care provider at an [facility] or [facility] within the past two years? Yes.   Have you been in close proximity to/do you have a known exposure to a person with a confirmed case of influenza? No.     General treatment eligibility:  Date of positive COVID test (PCR or at home)?  7/30/29    Are you or have you been hospitalized for this COVID-19 infection? No.   Have you received monoclonal antibodies or antiviral treatment for COVID-19 since this positive test? No.   Do you have any of the following conditions that place you at risk of being very sick from COVID-19?   - Chronic lung diseases such as asthma, bronchiectasis, COPD, interstitial lung disease, pulmonary embolism, pulmonary hypertension   - Overweight or Obesity (BMI >85th percentile or BMI 25 or higher)  Yes, patient has at least one high risk condition as noted above.     Current COVID symptoms:   - fever or chills  - muscle or body aches  - headache  - sore throat  - congestion or runny nose  - diarrhea  Yes. Patient has at least one symptom as selected.     How many days since symptoms started? 5 days or less. Established patient, 12 years or older weighing at least 88.2 lbs, who has symptoms that started in the past 5 days, has not been hospitalized nor received treatment already, and is at risk for being very sick from COVID-19.     Treatment eligibility by  RN:  Are you currently pregnant or nursing? No  Do you have a clinically significant hypersensitivity to nirmatrelvir or ritonavir, or toxic epidermal necrolysis (TEN) or Costa-Buzz Syndrome? No  Do you have a history of hepatitis, any hepatic impairment on the Problem List (such as Child-Ramos Class C, cirrhosis, fatty liver disease, alcoholic liver disease), or was the last liver lab (hepatic panel, ALT, AST, ALK Phos, bilirubin) elevated in the past 6 months? No  Do you have any history of severe renal impairment (eGFR < 30mL/min)? No    Is patient eligible to continue? Yes, patient meets all eligibility requirements for the RN COVID treatment (as denoted by all no responses above).     Current Outpatient Medications   Medication Sig Dispense Refill    allopurinol (ZYLOPRIM) 300 MG tablet TAKE 1 TABLET(300 MG) BY MOUTH DAILY 30 tablet 0    fluticasone-salmeterol (ADVAIR DISKUS) 250-50 MCG/ACT inhaler Inhale 1 puff into the lungs every 12 hours 1 each 3    gabapentin (NEURONTIN) 300 MG capsule TAKE 3 CAPSULES BY MOUTH THREE TIMES DAILY 270 capsule 1    lisinopril (ZESTRIL) 10 MG tablet TAKE 1 TABLET(10 MG) BY MOUTH DAILY 45 tablet 0   A long time since taking Gabapentin-per patient.    Medications from List 1 of the standing order (on medications that exclude the use of Paxlovid) that patient is taking: NONE. Is patient taking Jluis's Wort? No  Is patient taking Concow's Wort or any meds from List 1? No.   Medications from List 2 of the standing order (on meds that provider needs to adjust) that patient is taking: salmeterol and salmeterol-containing medications (Advair), explained a provider visit is necessary to discuss medication adjustments while taking Paxlovid. Is patient on any of the meds from List 2? Yes. Patient will be scheduled or transferred to a  at the end of this call.     Video visit scheduled with Dr. Chan on 7/31/24 at 1130.  Visit date and time confirmed with  patient.    [employer] message sent to patient with COVID-19 patient instructions.    Encouraged patient to call back and ask to speak with a triage nurse for any new, changing or worsening symptoms.    Patient verbalized understanding and in agreement with plan.  CHARLIE Wise, RN-[facility]            Reason for Disposition   HIGH RISK patient (e.g., weak immune system, age > 64 years, obesity with BMI of 30 or higher, pregnant, chronic lung disease or other chronic medical condition) and COVID symptoms (e.g., cough, fever)  (Exceptions: Already seen by doctor or NP/PA and no new or worsening symptoms.)    Additional Information   Negative: SEVERE difficulty breathing (e.g., struggling for each breath, speaks in single words)   Negative: Difficult to awaken or acting confused (e.g., disoriented, slurred speech)   Negative: Bluish (or gray) lips or face now   Negative: Shock suspected (e.g., cold/pale/clammy skin, too weak to stand, low BP, rapid pulse)   Negative: Sounds like a life-threatening emergency to the triager   Negative: Diagnosed or suspected COVID-19 and symptoms lasting 3 or more weeks   Negative: COVID-19 exposure and no symptoms   Negative: COVID-19 vaccine reaction suspected (e.g., fever, headache, muscle aches) occurring 1 to 3 days after getting vaccine   Negative: COVID-19 vaccine, questions about   Negative: Lives with someone known to have influenza (flu test positive) and flu-like symptoms (e.g., cough, runny nose, sore throat, SOB; with or without fever)   Negative: Possible COVID-19 symptoms and triager concerned about severity of symptoms or other causes   Negative: COVID-19 and breastfeeding, questions about   Negative: SEVERE or constant chest pain or pressure  (Exception: Mild central chest pain, present only when coughing.)   Negative: MODERATE difficulty breathing (e.g., speaks in phrases, SOB even at rest, pulse 100-120)   Negative: Headache and stiff  neck (can't touch chin to chest)   Negative: Oxygen level (e.g., pulse oximetry) 90% or lower   Negative: Chest pain or pressure  (Exception: MILD central chest pain, present only when coughing.)   Negative: Drinking very little and dehydration suspected (e.g., no urine > 12 hours, very dry mouth, very lightheaded)   Negative: Patient sounds very sick or weak to the triager   Negative: MILD difficulty breathing (e.g., minimal/no SOB at rest, SOB with walking, pulse <100)   Negative: Fever > 103 F (39.4 C)   Negative: Fever > 101 F (38.3 C) and over 60 years of age   Negative: Fever > 100.0 F (37.8 C) and bedridden (e.g., CVA, chronic illness, recovering from surgery)    Protocols used: Coronavirus (COVID-19) Diagnosed or [id number]

## 2024-08-08 NOTE — TELEPHONE ENCOUNTER
Results and recommendations sent to patient through [employer] as there has not been response to phone calls.

## 2024-08-19 ENCOUNTER — OFFICE VISIT (OUTPATIENT)
Dept: FAMILY MEDICINE | Facility: CLINIC | Age: 29
End: 2024-08-19
Payer: COMMERCIAL

## 2024-08-19 VITALS
SYSTOLIC BLOOD PRESSURE: 128 MMHG | WEIGHT: 263 LBS | HEART RATE: 68 BPM | BODY MASS INDEX: 32.7 KG/M2 | OXYGEN SATURATION: 100 % | TEMPERATURE: 97.6 F | DIASTOLIC BLOOD PRESSURE: 80 MMHG | HEIGHT: 75 IN | RESPIRATION RATE: 21 BRPM

## 2024-08-19 DIAGNOSIS — M1A.00X0 CHRONIC IDIOPATHIC GOUT: ICD-10-CM

## 2024-08-19 DIAGNOSIS — Z80.8 FAMILY HISTORY OF SKIN CANCER: ICD-10-CM

## 2024-08-19 DIAGNOSIS — Z82.69 FAMILY HISTORY OF GOUT: ICD-10-CM

## 2024-08-19 DIAGNOSIS — D22.9 MULTIPLE PIGMENTED NEVI: ICD-10-CM

## 2024-08-19 DIAGNOSIS — I10 HYPERTENSION, UNSPECIFIED TYPE: ICD-10-CM

## 2024-08-19 DIAGNOSIS — E79.0 HYPERURICEMIA: ICD-10-CM

## 2024-08-19 DIAGNOSIS — E78.5 DYSLIPIDEMIA: ICD-10-CM

## 2024-08-19 DIAGNOSIS — E83.52 SERUM CALCIUM ELEVATED: ICD-10-CM

## 2024-08-19 DIAGNOSIS — J45.40 MODERATE PERSISTENT ASTHMA WITHOUT COMPLICATION: Primary | ICD-10-CM

## 2024-08-19 DIAGNOSIS — E66.09 CLASS 1 OBESITY DUE TO EXCESS CALORIES WITHOUT SERIOUS COMORBIDITY WITH BODY MASS INDEX (BMI) OF 32.0 TO 32.9 IN ADULT: ICD-10-CM

## 2024-08-19 DIAGNOSIS — R41.840 DISTURBED CONCENTRATION: ICD-10-CM

## 2024-08-19 DIAGNOSIS — E66.811 CLASS 1 OBESITY DUE TO EXCESS CALORIES WITHOUT SERIOUS COMORBIDITY WITH BODY MASS INDEX (BMI) OF 32.0 TO 32.9 IN ADULT: ICD-10-CM

## 2024-08-19 DIAGNOSIS — M79.621 PAIN OF RIGHT UPPER ARM: ICD-10-CM

## 2024-08-19 PROCEDURE — 99214 OFFICE O/P EST MOD 30 MIN: CPT | Performed by: FAMILY MEDICINE

## 2024-08-19 PROCEDURE — G2211 COMPLEX E/M VISIT ADD ON: HCPCS | Performed by: FAMILY MEDICINE

## 2024-08-19 RX ORDER — LISINOPRIL 10 MG/1
10 TABLET ORAL DAILY
Qty: 90 TABLET | Refills: 1 | Status: SHIPPED | OUTPATIENT
Start: 2024-08-19

## 2024-08-19 RX ORDER — ALLOPURINOL 300 MG/1
300 TABLET ORAL DAILY
Qty: 30 TABLET | Refills: 0 | Status: CANCELLED | OUTPATIENT
Start: 2024-08-19

## 2024-08-19 RX ORDER — FLUTICASONE PROPIONATE AND SALMETEROL 250; 50 UG/1; UG/1
1 POWDER RESPIRATORY (INHALATION) EVERY 12 HOURS
Qty: 3 EACH | Refills: 3 | Status: SHIPPED | OUTPATIENT
Start: 2024-08-19

## 2024-08-19 RX ORDER — ALBUTEROL SULFATE 90 UG/1
2 AEROSOL, METERED RESPIRATORY (INHALATION) EVERY 6 HOURS PRN
Qty: 18 G | Refills: 0 | Status: SHIPPED | OUTPATIENT
Start: 2024-08-19

## 2024-08-19 RX ORDER — LISINOPRIL 10 MG/1
10 TABLET ORAL DAILY
Qty: 45 TABLET | Refills: 0 | Status: CANCELLED | OUTPATIENT
Start: 2024-08-19

## 2024-08-19 RX ORDER — ALLOPURINOL 300 MG/1
300 TABLET ORAL DAILY
Qty: 90 TABLET | Refills: 1 | Status: SHIPPED | OUTPATIENT
Start: 2024-08-19

## 2024-08-19 ASSESSMENT — ASTHMA QUESTIONNAIRES
QUESTION_2 LAST FOUR WEEKS HOW OFTEN HAVE YOU HAD SHORTNESS OF BREATH: NOT AT ALL
QUESTION_3 LAST FOUR WEEKS HOW OFTEN DID YOUR ASTHMA SYMPTOMS (WHEEZING, COUGHING, SHORTNESS OF BREATH, CHEST TIGHTNESS OR PAIN) WAKE YOU UP AT NIGHT OR EARLIER THAN USUAL IN THE MORNING: NOT AT ALL
ACT_TOTALSCORE: 24
QUESTION_1 LAST FOUR WEEKS HOW MUCH OF THE TIME DID YOUR ASTHMA KEEP YOU FROM GETTING AS MUCH DONE AT WORK, SCHOOL OR AT HOME: NONE OF THE TIME
QUESTION_4 LAST FOUR WEEKS HOW OFTEN HAVE YOU USED YOUR RESCUE INHALER OR NEBULIZER MEDICATION (SUCH AS ALBUTEROL): NOT AT ALL
ACT_TOTALSCORE: 24
QUESTION_5 LAST FOUR WEEKS HOW WOULD YOU RATE YOUR ASTHMA CONTROL: WELL CONTROLLED

## 2024-08-19 ASSESSMENT — PAIN SCALES - GENERAL: PAINLEVEL: NO PAIN (0)

## 2024-08-19 NOTE — PROGRESS NOTES
The below note was dictated using voice recognition. Although reviewed after completion, some word and grammatical error may remain .       Assessment & Plan     Moderate persistent asthma without complication  Asthma  mild persistent well controlled, ACT =24, Asthma action plan place on Advair 250/50 prescribed 1 puff twice a day, reports no gaps in use. No side effects. Has held off on lung function test and pulmonary referral. Continues to vape marijuana & encouraged to quit. Reported recently recovered from a cold 3 weeks ago when was positive for covid. Used albuterol when sick otherwise just once a month or less. Would like an extra albuterol inhaler to keep in his backpack. To get the new covid vaccine & flu shot in the fall.     Hypertension improved on lisinopril 10 mg started in march 2024. Irregular follow up. Stable kidney function in june 2024 on this med. Encouraged a low-salt , low caffeine low alcohol in diet, increase regular physical activity and work to lose 10% of total body weight.  Advised to avoid NSAID's as much as possible. Has FH of HTN.     BMI with comorbid HTN & dyslipidemia, was 35 now down to 32. Wt down 10 lbs. Walks 5 miles 3/ wk in summer and also [place]. & In winter active with fly fishing. Discussed alcohol use. Reports no known snoring. To continue to work on diet, exercise low carb intake smaller portions, more plant-based and some weight loss.      Dyslipidemia: elevated triglycerides and low HDL, & normal LFT's. Encouraged to continue regular exercising 150 minutes of aerobic exercise per week & weight loss will help.     Chronic idiopathic gout and hyperuricemia asymptomatic on allopurinol 300 mg daily, (increased in march) then in June uric acid improved but still not goal at 7.5. No symptoms. Discussed options, desires to reduce red meat and recheck in 6 months as no symptoms. Notes drinks Alcohol 2 nights a week,has a glass of whisky or a couple beers,  encouraged also reducing alcohol intake.     Right upper extremity pain & hx of cervical spondylosis. Pain noted worse if sitting or standing typing at desk only.  Hx of prior left shoulder pain and prior ac separation and surgery in the past. Previously would go in to see chiro as needed but symptoms did not get better with chiro manipulation and then seen in nov 2023 by [facility] & then by spine in early march 2024. Xray done showed normal cervical spine alignment. Mild loss of disc height at C5-6. Scattered mild degenerative changes including facet arthropathy, uncinate hypertrophy, and anterior endplate osteophytosis. No prevertebral edema. No mass in the visualized lung apices. Was given gabapentin 300 mg told to titrate up to tid. Mostly just taking bedtime as makes him feel loopy. Returned to [facility] in may 2024 and MRI ordered & MRI done in 7/2024 showed mild cervical degenerative changes at C5-6 where there is mild spinal canal narrowing and mild bilateral neural foraminal narrowing but no myelopathic cord signal. Has been doing PT and exercises and stretching daily and feels better. Is supposed to return to [facility] to discuss MRI results.     Elevated serum calcium noted in April 2023 and again in June 2023 though ionized calcium at that time was normal.  Recheck calcium along with ionized calcium, vitamin D and parathyroid in nov 2023 had been normal. In march 2024  Calcium again came back elevated & recheck in June showed mildly elevated calcium of 10.1,with normal vit d of 24, low pth of 10 and a normal ionized calcium. Asymptomatic. Had offered an e consult to endo but did not hear back. Is not on any supplements. Currently just desires to monitor.     Noted history of trouble concentrating in the past reported no depression or anxiety. Referred for diagnostic eval and after several months did get an evaluation at [facility] in 2022, records were not available but reported was told had a neurocognitive processing  disorder related to processing speed. Had had no trouble taking tests in the past.  Instead procrastination was the bigger issue. Referred to a community psychiatry to further evaluate and treat. No apt with psyche made. Did start therapy for skills in 8/2023. Has been doing therapy for the ADHD like symptoms & feels its been going well. He got rid of external distractions and was still finding tough time concentrating.  his mind would go to daydreaming when trying to do code.  Was advised by his therapist to talk about meds and was encouraged at last two visits he call [facility] where had diagnostic assessment about setting up an appointment with a psychiatrist there to discuss medications if indicated. He continued with skills based CBT April through May, then was no show and not seen since. Currently he is not desiring to see psyche for eval of an ADHD dx. He felt the therapy to be helpful but couldn't get to it this summer as was busy & plans to pick it back up in the fall.     Multiple moles and family history of skin cancer encouraged to schedule with dermatology. Initially was in aug then rescheduled to sept and now might be out of town and rescheduled again to April 2025. Advised could see if can get in with derm consultant below sooner. No longer sees the patch on left groin area, previously treated with an antifungal cream.     Return for a physical late feb 2025  - fluticasone-salmeterol (ADVAIR DISKUS) 250-50 MCG/ACT inhaler; Inhale 1 puff into the lungs every 12 hours  - albuterol (PROAIR HFA/PROVENTIL HFA/VENTOLIN HFA) 108 (90 Base) MCG/ACT inhaler; Inhale 2 puffs into the lungs every 6 hours as needed for shortness of breath, wheezing or cough    Hypertension, unspecified type  Hypertension improved on lisinopril 10 mg started in march 2024. Irregular follow up. Stable kidney function in june 2024 on this med. Encouraged a low-salt , low caffeine low alcohol in diet, increase regular physical activity  and work to lose 10% of total body weight.  Advised to avoid NSAID's as much as possible. Has FH of HTN.   - lisinopril (ZESTRIL) 10 MG tablet; Take 1 tablet (10 mg) by mouth daily    Class 1 obesity due to excess calories without serious comorbidity with body mass index (BMI) of 32.0 to 32.9 in adult  BMI with comorbid HTN & dyslipidemia, was 35 now down to 32. Wt down 10 lbs. Walks 5 miles 3/ wk in summer and also [place]. & In winter active with fly fishing. Discussed alcohol use. Reports no known snoring. To continue to work on diet, exercise low carb intake smaller portions, more plant-based and some weight loss.     Dyslipidemia  Dyslipidemia: elevated triglycerides and low HDL, & normal LFT's. Encouraged to continue regular exercising 150 minutes of aerobic exercise per week & weight loss will help.     Hyperuricemia  Chronic idiopathic gout  Family history of gout  Chronic idiopathic gout and hyperuricemia asymptomatic on allopurinol 300 mg daily, (increased in march) then in June uric acid improved but still not goal at 7.5. No symptoms. Discussed options, desires to reduce red meat and recheck in 6 months as no symptoms. Notes drinks Alcohol 2 nights a week,has a glass of whisky or a couple beers, encouraged also reducing alcohol intake.   - allopurinol (ZYLOPRIM) 300 MG tablet; Take 1 tablet (300 mg) by mouth daily    Pain of right upper arm  Right upper extremity pain & hx of cervical spondylosis. Pain noted worse if sitting or standing typing at desk only.  Hx of prior left shoulder pain and prior ac separation and surgery in the past. Previously would go in to see chiro as needed but symptoms did not get better with chiro manipulation and then seen in nov 2023 by [facility] & then by spine in early march 2024. Xray done showed normal cervical spine alignment. Mild loss of disc height at C5-6. Scattered mild degenerative changes including facet arthropathy, uncinate hypertrophy, and anterior  endplate osteophytosis. No prevertebral edema. No mass in the visualized lung apices. Was given gabapentin 300 mg told to titrate up to tid. Mostly just taking bedtime as makes him feel loopy. Returned to [facility] in may 2024 and MRI ordered & MRI done in 7/2024 showed mild cervical degenerative changes at C5-6 where there is mild spinal canal narrowing and mild bilateral neural foraminal narrowing but no myelopathic cord signal. Has been doing PT and exercises and stretching daily and feels better. Is supposed to return to [facility] to discuss MRI results.     Serum calcium elevated  Elevated serum calcium noted in April 2023 and again in June 2023 though ionized calcium at that time was normal.  Recheck calcium along with ionized calcium, vitamin D and parathyroid in nov 2023 had been normal. In march 2024  Calcium again came back elevated & recheck in June showed mildly elevated calcium of 10.1,with normal vit d of 24, low pth of 10 and a normal ionized calcium. Asymptomatic. Had offered an e consult to [facility] but did not hear back. Is not on any supplements. Currently just desires to monitor.     Disturbed concentration  Noted history of trouble concentrating in the past reported no depression or anxiety. Referred for diagnostic eval and after several months did get an evaluation at [facility] in 2022, records were not available but reported was told had a neurocognitive processing disorder related to processing speed. Had had no trouble taking tests in the past.  Instead procrastination was the bigger issue. Referred to a community psychiatry to further evaluate and treat. No apt with psyche made. Did start therapy for skills in 8/2023. Has been doing therapy for the ADHD like symptoms & feels its been going well. He got rid of external distractions and was still finding tough time concentrating.  his mind would go to daydreaming when trying to do code.  Was advised by his therapist to talk about meds and was encouraged at  "last two visits he call [facility] where had diagnostic assessment about setting up an appointment with a psychiatrist there to discuss medications if indicated. He continued with skills based CBT April through May, then was no show and not seen since. Currently he is not desiring to see psyche for eval of an ADHD dx. He felt the therapy to be helpful but couldn't get to it this summer as was busy & plans to pick it back up in the fall.     Multiple pigmented nevi  Family history of skin cancer  Multiple moles and family history of skin cancer encouraged to schedule with dermatology. Initially was in aug then rescheduled to sept and now might be out of town and rescheduled again to April 2025. Advised could see if can get in with derm consultant below sooner. No longer sees the patch on left groin area, previously treated with an antifungal cream.     Return for a physical late feb 2025    BMI  Estimated body mass index is 32.87 kg/m  as calculated from the following:    Height as of this encounter: 1.905 m (6' 3\").    Weight as of this encounter: 119.3 kg (263 lb).   Weight management plan: Discussed healthy diet and exercise guidelines  Work on weight loss  Regular exercise  See Patient Instructions  The longitudinal plan of care for the diagnosis(es)/condition(s) as documented were addressed during this visit. Due to the added complexity in care, I will continue to support David in the subsequent management and with ongoing continuity of care.        Subjective   David is a 29 year old, presenting for the following health issues:  Follow Up (/BP)        8/19/2024     3:05 PM   Additional Questions   Roomed by MAIKEL Hewitt   Accompanied by Self     History of Present Illness       Hypertension: He presents for follow up of hypertension.  He does not check blood pressure  regularly outside of the clinic. Outpatient blood pressures have not been over 140/90. He does not follow a low salt diet.     He eats 2-3 servings of " fruits and vegetables daily.He consumes 1 sweetened beverage(s) daily.He exercises with enough effort to increase his heart rate 20 to 29 minutes per day.  He exercises with enough effort to increase his heart rate 4 days per week. He is missing 1 dose(s) of medications per week.     CURRENTLY  Asthma  mild persistent well controlled, ACT =24, Asthma action plan place on Advair 250/50 prescribed 1 puff twice a day, reports no gaps in use. No side effects. Has held off on lung function test and pulmonary referral. Continues to vape marijuana. Recovered from a cold 3 weeks ago was positive for covid 3 weeks ago, then kvng got sick she never tested positive for covid. Used albuterol when sick otherwise just once a month or less. Would like an extra albuterol inhaler to keep in his backpack. To get the new covid vaccine & flu shot in the fall.     Hypertension improved on lisinopril 10 mg started in march 2024. Irregular follow up. Stable kidney function in june 2024 on this med. Encouraged a low-salt low caffeine low alcohol in diet, increase regular physical activity and work to lose 10% of total body weight.  Advised to avoid NSAID's as much as possible. FH of HTN.     BMI with comorbid HTN & dyslipidemia, was 35 now down to 32. Wt down 10 lbs. Walks 5 miles 3/ wk in summer and also [place]. In winter active with fly fishing. Discussed alcohol use. Reports no  known snoring. To continue to work on diet, exercise low carb intake smaller portions, more plant-based and some weight loss.      Dyslipidemia: elevated triglycerides and low HDL, & normal LFT's. Encouraged to continue regular exercising 150 minutes of aerobic exercise per week & weight loss will help.     Chronic idiopathic gout and hyperuricemia asymptomatic on allopurinol 300 mg daily( increased in march) in June uric acid remained elevated at 7.5. No symptoms. Discussed options, desires to reduce red meat and recheck in 6 months as no  symptoms. Notes drinks Alcohol 2 nights a week has a glass of whisky or a couple beers, encouraged also reducing alcohol intake.     Right upper extremity pain & hx of cervical spondylosis. Pain noted worse if sitting or standing typing at desk only.  Hx of prior left shoulder pain and prior ac separation and surgery in the past. Previously would go in to see chiro as needed but symptoms did not get better with chiro manipulation and then seen in nov 2023 by [facility] & then by spine in early march 2024. Xray done showed normal cervical spine alignment. Mild loss of disc height at C5-6. Scattered mild degenerative changes including facet arthropathy, uncinate hypertrophy, and anterior endplate osteophytosis. No prevertebral edema. No mass in the visualized lung apices. Was given gabapentin 300 mg told to titrate up to tid. Was mostly just taking bedtime as made him feel loopy. Returned to [facility] in may and MRI ordered & MRI done in 7/2024 showed mild cervical degenerative changes at C5-6 where there is mild spinal canal narrowing and mild bilateral neural foraminal narrowing but no myelopathic cord signal. Has been doing PT and exercises and stretching daily and feels better. Is supposed to return to [facility] to discuss MRI results.     Elevated serum calcium noted in April 2023 and again in June 2023 though ionized calcium at that time was normal.  Recheck calcium along with ionized calcium, vitamin D and parathyroid in nov 2023 had been normal. In march 2024  Calcium again came back elevated & recheck in June showed mildly elevated calcium of 10.1,with normal vit d of 24, low pth of 10 and a normal ionized calcium. Asymptomatic. Had offered an e consult to endo but did not hear back. Is not on any supplements. Currently just desires to monitor.     Noted history of trouble concentrating in the past reported no depression or anxiety. Referred for diagnostic eval and after several months did get an evaluation at [facility] in  2022, records were not available but reported was told had a neurocognitive processing disorder related to processing speed. Had had no trouble taking tests in the past.  Instead procrastination was the bigger issue. Referred to a community psychiatry to further evaluate and treat. No apt with psyche made. Did start therapy for skills in 8/2023. Has been doing therapy for the ADHD like symptoms & feels its been going well. He got rid of external distractions and was still finding tough time concentrating.  his mind would go to daydreaming when trying to do code.  Was advised by his therapist to talk about meds and was encouraged last two visits he call [facility] where had diagnostic assessment about setting up an appointment with a psychiatrist there to discuss medications if indicated. He continued with skills based CBT April through may then was no show and not seen since. Currently he is not desiring to see psyche for eval of an ADHD dx. He felt the therapy to be helpful but couldn't get to it this summer as was busy but plans to pick it back up in the fall.     Multiple moles and family history of skin cancer encouraged to schedule with dermatology. Initially was in aug then rescheduled to sept and now might be out of town and rescheduled again to April 2025. Advised could see if can get in with derm consultant below sooner. No longer sees the patch on left groin area, previously treated with an antifungal cream.     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  [place]  negative.  Moved to [place] in , no prior records available, no regular care prior to that, Seen in urgent care 2019 for toe pain due to history of gout treated with prednisone, tested positive for COVID in 2020 and 2021.  Seen once in [place] on 2021 in urgent care for left toe pain and prior history of gout and given prednisone for 5 days.       Seen first time 22 to establish care and for preventive health and additional concerns. Advised self testicular check regularly. Labs done. Health care maintenance reviewed. Discussed working on healthcare directives.  Was given the Flu & pneumonia vaccines. He was to clarify when he last had Tdap & noted Covid vaccine x 3 up-to-date. Asthma was uncontrolled as had run out of his controller meds 6 months prior, noting shortness of breath with activity limiting exercise, resumed generic Wixela  250/50, 1 puff twice a day & if not better can do lung function tests and refer to pulmonary. Noted elevated blood pressure could be white coat hypertension & or  due to weight.  Recommended a low-salt diet, limiting alcohol to 1 drink a day no more than 7 total a week, 10% of total body weight loss & increasing aerobic activity, encouraged to katrina a home blood pressure machine and checking it at different times different days and calling us if it was consistently more than 130 x 80.  If persistently elevated despite lifestyle changes then to consider medications. Discussed BMI, diet, exercise weight loss, avoiding marijuana as much as possible as this could increase weight gain over time. Reviewed his history of gout diagnosed clinically based on toe pain in the past and family history of gout in dad.  Recent  joint pain had resolved.  Had made some recent lifestyle & dietary changes. Would avoid as needed prednisone as much as possible given long-term side effect (can worsen weight gain increases risk of diabetes, ulcers), may use indomethacin he has at home as needed.  Always try to use the lowest dose for the shortest duration of time as Indocin like other NSAID's increased risk of GI bleeding and heart attack.  Other options could be colchicine prn.  Side effects would be nausea vomiting diarrhea etc.  We can also consider referral to rheumatologist if necessary. History of trouble focusing now affecting job: referred to a psychologist for diagnostic evaluation of ADHD.  Encouraged to avoid all marijuana prior to evaluation so they can get an unbiased evaluation.  Based on results could make recommendations for either skill base treatment and/or referral to a community psychiatrist for medication management.  Some medications like stimulants could make blood pressure worse so this might be tricky if his blood pressure remained elevated. Was to return in 1 month for blood pressure check, asthma follow-up etc.   Lab showed a normal cbc, TSH, HBA1c, Micro albumin, lipids other than elevated TG and uric acd was elevated at 8.1 & allopurinol started.     Seen  3/3/2022  & noted Asthma was better. ACT up from 13 to 17. Had only restarted generic Advair 1.5 weeks prior and expected to further improve with time. Continued on generic Advair 1 puff twice a day ( turned out with his new insurance was cheaper than Wixela previously given). Had been on Advair before with good control in the past. Encouraged not to use albuterol first thing in am but use the Advair instead then see if albuterol needed. Goal was to get to point where albuterol only used as rescue and one inhaler last 1 year preferably.  Encouraged to use albuterol prior to exercise if indicated. Lung function tests ordered. Felt no need for pulmonary referral yet.  Encouraged to avoid Vaping/ inhaling cannabis to protect lungs. BP was better, & advised to continue with low salt, low alcohol diet, there was no indication to check an ultrasound or do labs to search for secondary causes of elevated BP at the time. BMI had improved, was to continue to work on diet, exercise and losing 10 % of total body weight over the year and rechecking TG at next physical.  Gout noted stable on allopurinol 100 mg, uric acid level checked to see if dose needed adjustment, was to continue with a hypouricemic diet ( low red meat, low alcohol). Use indomethacin sparingly as could raise BP( not required in a while) & felt no indication currently to see a rheumatologist. Noted his diagnostic eval for decreased concentration was scheduled in May outside [place]. Tdap was  given & advised to check in virtually in 4 weeks or so for asthma check in. BMP came back normal and uric acid was down from 8.1 to 7.6 and continued on allopurinol 100 mg daily.     Seen virtually 4/8/22 noted asthma was doing better. Continued on Advair twice a day & refill sent in 1 yr, to Use albuterol as rescue. Had not scheduled PFT's yet. Felt no indication to see a pulmonologist. Gout was stable on allopurinol 100 mg daily. Uric acid improved from original. Goal was eventually keep it less than 6.5 to prevent attacks. Was to continue with a low uric acid level in diet ( diet / lifestyle measures). Was to get a diagnostic assessment for ADHD eval in may. Advised to return in 5 months for asthma follow up.   No follow up made. Treated for Covid in sept with paxlovid. Noted albuterol inhaler refilled 1/24/22, 2/3/22, 5/27/22, 6/29/22,8/11/22, 9/2/22, 10/22/22 & 1/19/23 and advised follow up.      Seen 1/20/23 for preventive health. Healthcare maintenance reviewed. Did not have healthcare directives and honoring choices given to review. Flu shot up-to-date. COVID-vaccine primary and booster series up-to-date. Prevnar given.  Recommended hepatitis B vaccination felt may have had it when younger, was to check records with mom from [place] where grew up and if unable to find to do immune testing in the future and go from there. Moderate persistent asthma  improved from a year ago but still not at goal.  Continued on Advair 250/50 1 puff twice daily rinse mouth well after using inhaler. To limit use of albuterol as rescue only, had been filled 8 times in the last year which suggested high use & need for better asthma control. Initially filling freq was due to not being on Advair then due to losing inhalers at least 3 different times. To recheck asthma in 3 months.  Encouraged to schedule lung function tests  In the meantime encouraged to avoid Vaping and work on weight loss which may help.  If asthma scores continued to be less than 20 to consider changing Advair to Breo, & seeing a pulmonologist to further evaluate and treat.  Asthma action plan verbally in place.  Prevnar 20 given.  Noted had 3 refills on Advair  and albuterol recently refilled so he was to contact us when these were needed. Chronic idiopathic gout and family history of gout  asymptomatic without any flares on allopurinol 100 mg daily.  Had not required indomethacin use in a long time.  Advised to continue to stay well-hydrated, drink alcohol in moderation, decrease red meat consumption and continue current dosing of allopurinol and will check uric acid levels and make further recommendations.  Refill sent in.   New dx of HTN made. Blood pressure elevated last year and again in jan  Recheck better but still borderline.  Thought due to white coat hypertension/anxiety.  Did have family history of hypertension and genetic predisposition for this. Opted to hold off on medication for now.  EKG did not show any effect of blood pressure on the heart.  Recommended a low-salt diet, low alcohol intake, low caffeine intake, increase physical activity, at least 5 to 10% of total  body weight loss to make a difference in blood pressure, avoid anti-inflammatories as much as possible, given blood pressure would be hesitant to be prescribing any stimulants but could discuss that more with a psychiatrist when saw them.  Prescription given for home blood pressure machine.  To check blood pressure at home if consistently over 130 x 80 may benefit from medication.  To return in 3 months for blood pressure check and if still elevated consider medication at that time. BMI more than 35, reported no significant snoring.  To sleep on side, work on weight loss, low-carb low-fat diet & Increase physical activity. Reviewed multiple pigmented moles and family history of skin cancer & referred to dermatology for skin check. Noted history of trouble concentrating in the past reported no depression or anxiety.  Referred for diagnostic eval and after several months did get an evaluation at [facility], records were not available but reported was told had a neurocognitive processing disorder related to processing speed.  Had had no trouble taking tests in the past.  Instead procrastination was the bigger issue. Referred to a community psychiatry to further evaluate and treat. Noted history of COVID treated with Paxlovid in September 2022 with no residual symptoms. Micro albumin was normal, HDL low, triglycerides elevated, CMP otherwise unremarkable, TSH normal, uric acid 7.8, CBC and hemoglobin A1c normal.  On 2/24/2023 called about an albuterol refill.  Advised pulmonary referral and PFT's as recently filled in January. These were not scheduled.     Seen 4/28/23, Asthma better, continued on Advair 250/50 1 puff twice a day, to rinse well after using. Use albuterol only for rescue, use had decreased, Reminded to schedule lung function tests. Held off on pulmonary referral. Encouraged to avoid Vaping. Refills for Advair sent in 1 yr. Sent in one albuterol inhaler for rescue. Had a lot left on one sent in feb.  BP  high, recheck better. Encouraged low salt, low caffeine, low alcohol in diet and increased regular physical activity and work to loose 10 % of total body weight. Opted no meds. To recheck in 6 months. Encouraged to get a home blood pressure machine.  To check blood pressure at home if consistently over 130 x 80 may benefit from medication.   Dyslipidemia, / BMI >34, weight down a bit, to continue to work on diet, exercise, low carb, smaller portion, more plant based mediterranean diet and weight loss. To recheck in jan at next physical   Gout better asymptomatic on allopurinol 100 mg daily, noted was taking more consistently. Had had no gout attacks recently was to wait to see labs to refill med in case dose needed changing. To avoid indocin and other NSAID'S as much as possible as they could raise BP  Reminded to schedule with derm for fh of skin cancer and multiple moles. Sun protection advised. Was to follow up mental health as planned for trouble concentrating and procrastination in August. Checked for hep B immunity as awaited his mom to send us his childhood immunizations, if not immune to consider revaccination against hep B     BMP showed mildly elevated calcium advised to recheck in 1 month.  Uric acid was elevated at 7.8.  Recheck labs on 6/16 showed calcium of 10.1 ionized calcium normal uric acid elevated at 7.6 and not immune to hepatitis B.  Allopurinol increased to 200 mg on 6/29 and to recheck labs at follow-up.  No appointment with the community psychiatrist May did start seeing a therapist on 8/22 for ADHD related symptoms.  Seen them 9/1, 9/22, 10/6, 10/22 and most recently 11/17/2023.     Seen 11/20/23 Asthma well controlled, on Advair 250/50 1 puff twice a day, to rinse well after use, had decreased use of albuterol as needed less than once to twice a month.  Or prior to exercise. Held off on lung function test and pulmonary referral though may be worth getting a baseline lung function test in  the new year. Reported was vaping less. Had enough refills till May.  Hypertension  borderline BP recheck better.  Encouraged a low-salt low caffeine low alcohol in diet, increase regular physical activity and work to lose 10% of total body weight. Opted not to take a medication.  Recommended checking blood pressure at home goal less than 130 x 80.  Avoid NSAID's as much as possible. If not better consider medication.  History of dyslipidemia elevated triglycerides and low HDL, BMI over 35 with elevated blood pressure, options discussed held off on meds or a weight specialist referral. To continue to work on diet exercise low carb intake smaller portions more plant-based and some weight loss.  Chronic idiopathic gout and hyperuricemia asymptomatic on allopurinol 200 mg daily.  Avoiding Indocin and other NSAID's as much as possible as it could raise blood pressure.  to avoid red meat as much as possible and limit alcohol intake.  Elevated calcium noted in April and again in June though ionized calcium at that time had been normal. To recheck calcium along with vitamin D and parathyroid  and if persistently elevated will refer to endocrine.  Noted history of trouble concentrating in the past reported no depression or anxiety.  Referred for diagnostic eval and after several months did get an evaluation at [facility] in 2022, records were not available but reported was told had a neurocognitive processing disorder related to processing speed. Had had no trouble taking tests in the past.  Instead procrastination was the bigger issue. Referred to a community psychiatry to further evaluate and treat. No apt with psyche made. Did start therapy for skills in 8/2023. Had been doing therapy for the ADHD like symptoms & felt was going well. He got rid of external distractions and still finding tough time concentrating.  Mind would go to daydreaming when trying to do code.  Was advised by therapist to talk about meds and encouraged  he call [facility] to make an appointment with psychiatrist there to discuss medications   Multiple moles and family history of skin cancer encouraged to schedule with dermatology. New patch left groin area was most suggestive of a fungal infection like tinea corporis or jock itch.  Advised athlete's foot ointment over-the-counter twice a day to area up to 1 month after resolved and follow-up with Derm on this.  Flu shot, COVID-vaccine & Hepatitis B booster given as not immune despite prior vaccination and will check titers at physical to see if this booster gives adequate immunity.  Was to return in January /February 2024 for preventive physical and follow-up     Seen by therapist 12/1/23, 12/15, 1/12, 1/26, 2/9 & 3/8/23. Seen by pain clinic 3/6/24 for cervicalgia, RUE myofascial pain. Xray done, started on gabapentin and referred to PT.    Seen 3/8/24 for a physical and follow up since seen in nov. Noted had been evaluated by PMDR recently for pain in right arm. Hx of prior left shoulder pain and prior ac separation and surgery. Ever since then if woke up occasionally with a crick in neck would see his chiro & it would go away. Most recent episode of crick in neck ws not helped by chiro & noted pain going down right arm to elbow.  Pain noted worse if sitting or standing typing at desk only. Able to fish with no symptoms. Seen by spine doctor early march . Xray done showed normal cervical spine alignment. Mild loss of disc height at C5-6. Scattered mild degenerative changes including facet arthropathy, uncinate hypertrophy, and anterior endplate osteophytosis. No prevertebral edema. No mass in the visualized lung apices. Was given gabapentin 300 mg told to titrate up to tid. Noted only taking at bedtime as made him feel loopy but would take  one at lunchtime if having a painful morning at work and if at home to get through the afternoon. Encouraged to start PT ordered & return to PMDR after that as planned.  Asthma   mild persistent well controlled, ACT =24 , on Advair 250/50 prescribed 1 puff twice a day, but using 2 puffs once a day, reported no gaps in use though med not filled in epic in a while. Did note girlfriend also had Advair so may be sharing. No side effects. Not used albuterol in a while. Had held off on lung function test and pulmonary referral. Noted continued to vape marijuana. Encouraged to avoid vaping and change Advair to 1 puff twice a day and may use controlled inhaler as well for rescue no more than 12 total puffs in 24 hrs. Med refilled & Asthma action plan placed in [facility]  Hypertension on no meds. blood pressure 148/77. Not on any stimulants.  Encouraged a low-salt low caffeine low alcohol in diet, increase regular physical activity and work to lose 10% of total body weight.  Advised to avoid NSAID's as much as possible. FH of HTN. Alcohol 3 to 4 / week. Agreeable to meds. Discussed options. Started lisinopril 10 mg  daily, to change posture slowly till got used to lower BP. Stay well hydrated, monitor for a dry hacky cough. Was to do a nurse apt with bmp in 3 weeks for BP check & see me end of may for BP   BMI 35 with HTN & dyslipidemia, no known snoring. To continue to work on diet, exercise low carb intake smaller portions,  more plant-based and some weight loss. Felt set back to exercise due to neck and arm. Agreeable to referral to wt specialist. Labs later showed  normal glucose, HBA1c & TSH.   Dyslipidemia: elevated triglycerides and low HDL, & normal LFT's. Encouraged exercising 150 minutes of aerobic exercise per week (30 minutes 5 days per week or 50 minutes 3 days per week are options), weight loss would help.   Chronic idiopathic gout and hyperuricemia asymptomatic on allopurinol 200 mg daily. Had not taken Indocin > 1 yr & avoiding other NSAID's as much as possible as it could raise blood pressure. Encouraged to continue to avoid red meat as much as possible and limit alcohol intake. Noted  med not filled in a while but he reported no gaps in med. Later uric acid came back elevated and allopurinol was increased to 300 mg daily, to recheck uric acid at follow up  Elevated calcium noted in April 2023 and again in June 2023 though ionized calcium at that time was normal. Recheck calcium along with ionized calcium, vitamin D and parathyroid in nov 2023 had been normal. Calcium came back elevated again but prior work up did not show a concerning cause, to recheck this at next apt .Had a normal CBC.  Noted history of trouble concentrating in the past, reported no depression or anxiety.  Referred for diagnostic eval and after several months did get an evaluation at [id number] in 2022, records were not available but reported was told had a neurocognitive processing disorder related to processing speed. Had had no trouble taking tests in the past.  Instead procrastination was the bigger issue. Referred to a community psychiatry to further evaluate and treat. No apt with psyche made. Did start therapy for skills in 8/2023. Had been doing therapy for the ADHD like symptoms & felt it had  been going well. He got rid of external distractions and was still finding tough time concentrating.  Mind would go to daydreaming when trying to do code.  Was advised by therapist to talk about meds and he was encouraged to call [facility] to make an appointment with psychiatrist there to discuss medications  Reported playing phone tag with them.  Multiple moles and family history of skin cancer encouraged to schedule with dermatology. Had apt in aug   In nov 2023 felt the new patch noted in left groin area was most suggestive of a fungal infection like tinea corporis. Advised athlete's foot ointment over-the-counter twice a day to area up to 1 month after resolved and follow-up with Derm on this. He only applied it for a week, saw no change and stopped. No itching. Advised needed to give med longer time to work and was to retry.   SOFIA  "reviewed. No ACP on file and declined infor,   Vaccines reviewed & UTD.   In nov given a Hepatitis booster as not immune despite prior vaccination and to check titers to see if  had adequate immunity. Later labs noted negative for Hep B but immune to hep B.     Seen by PT 3/22/24.  Seen by sports med 4/3/24 &MRI C spine ordered for RUE pain and hx of cervical spondylosis without myelopathy. Seen by Counsellor on 4/5, 4/19, & 5/3. Seen by sports med 5/3, noted continued pain dn not done mri and encouraged to do so. Did not return for nurse BP check or BMP since starting lisinopril. No showed to counselor on 5/31. Did labs on 6/21. Calcium remained mildly elevated with normal vit d and low normal pth & normal ionized calcium. Rest of lytes wnl. Offered considering an e consult to endo. Uric acid improved but still elevated. Was advised to continue lisinopril but if BP not goal would have to adjust. Same with allopurinol and if symptomatic gout. MRI c spine done 7/2024 showed a disc protrusion at C5/6 causing mild narrowing around the nerves at that segment of spine. Was recommend to schedule a follow up visit with Dr Nieto to review the results and discuss treatment options. They were unable to get a hold of him and my chart message sent. Seen by PT 7/12/24.        Review of Systems  Constitutional, HEENT, cardiovascular, pulmonary, GI, , musculoskeletal, neuro, skin, endocrine and psych systems are negative, except as otherwise noted.      Objective    /80 (BP Location: Right arm, Patient Position: Sitting, Cuff Size: Adult Regular)   Pulse 68   Temp 97.6  F (36.4  C) (Temporal)   Resp 21   Ht 1.905 m (6' 3\")   Wt 119.3 kg (263 lb)   SpO2 100%   BMI 32.87 kg/m    Body mass index is 32.87 kg/m .  Physical Exam   GENERAL: alert and no distress  EYES: Eyes grossly normal to inspection, PERRL and conjunctivae and sclerae normal, glasses  HENT: ear canals and TM's normal, nose and mouth without ulcers " or lesions  NECK: no adenopathy, no asymmetry, masses, or scars  RESP: lungs clear to auscultation - no rales, rhonchi or wheezes  CV: regular rate and rhythm, normal S1 S2, no S3 or S4, no murmur, click or rub, no peripheral edema  ABDOMEN: soft, non tender, no hepatosplenomegaly, no masses and bowel sounds normal  MS: no gross musculoskeletal defects noted, no edema  SKIN: no suspicious lesions or rashes  NEURO: Normal strength and tone, mentation intact and speech normal  PSYCH: mentation appears normal, affect normal/bright    No results found for any visits on 08/19/24.  No results found for this or any previous visit (from the past 24 hour(s)).        Signed Electronically by: Mariana Sherman MD

## 2024-09-30 ENCOUNTER — OFFICE VISIT (OUTPATIENT)
Dept: PHYSICAL MEDICINE AND REHAB | Facility: CLINIC | Age: 29
End: 2024-09-30
Payer: COMMERCIAL

## 2024-09-30 VITALS — OXYGEN SATURATION: 98 % | SYSTOLIC BLOOD PRESSURE: 141 MMHG | HEART RATE: 64 BPM | DIASTOLIC BLOOD PRESSURE: 74 MMHG

## 2024-09-30 DIAGNOSIS — M47.812 CERVICAL SPONDYLOSIS WITHOUT MYELOPATHY: Primary | ICD-10-CM

## 2024-09-30 DIAGNOSIS — M50.20 CERVICAL DISC HERNIATION: ICD-10-CM

## 2024-09-30 DIAGNOSIS — M79.18 MYOFASCIAL PAIN: ICD-10-CM

## 2024-09-30 PROCEDURE — 99213 OFFICE O/P EST LOW 20 MIN: CPT | Performed by: PAIN MEDICINE

## 2024-09-30 ASSESSMENT — PAIN SCALES - GENERAL: PAINLEVEL: NO PAIN (0)

## 2024-09-30 NOTE — LETTER
9/30/2024      David Martin  [id number]  [place] [address]      Dear Colleague,    Thank you for referring your patient, David Martin, to the [facility]. Please see a copy of my visit note below.      Assessment:     Diagnoses and all orders for this visit:  Cervical spondylosis without myelopathy  Cervical disc herniation  Myofascial pain     David Martin is a 29 year old y.o. male with past medical history significant for asthma, obesity, chronic idiopathic gout, hyperlipidemia, hyperuricemia, elevated calcium, hypertension, disturbed concentration who presents today for follow-up regarding neck and right upper extremity pain:     -Patient is doing quite well now.  Right upper extremity pain has improved and neck pain is also improved greatly.  His pain was likely secondary to C6 radiculopathy on the right.    Plan:     A shared decision making plan was used.  The patient's values and choices were respected. Prior medical records from our last visit on 5/3/2024 were reviewed today. The following represents what was discussed and decided upon by the provider and the patient.     -DIAGNOSTIC TESTS: Images were personally reviewed and interpreted.   --X-ray of the cervical spine dated 3/12/2024 is personally reviewed images interpreted and discussed with patient. There are mild spondylitic changes throughout the cervical spine with mild loss of disc height space on C5-6 with mild facet arthropathy throughout.   -- MRI of cervical spine dated 7/1/2024 is personally viewed images interpreted and discussed the patient.  There are mild degenerative changes at C5-6 there is mild spinal canal stenosis and mild neuroforaminal stenosis.     -INTERVENTIONS: No interventions at this time.    -MEDICATIONS: He is not on any medications for pain at this time.  -  Discussed side effects of medications and proper use. Patient verbalized understanding.    -PHYSICAL THERAPY:  Recommended continue with physical therapy on a consistent basis at home.    -PATIENT EDUCATION: We discussed pain management in a multiple fashion including physical therapy, medication management, possible future injections.    -FOLLOW UP: Patient will follow-up as needed.  Advised to contact clinic if symptoms worsen or change.    Subjective:     David Martin is a 29 year old male who presents today for follow-up regarding neck pain and right upper extremity pain.  Right upper extremity pain has resolved and does not come back.  Neck pain is also improving and most the time not having pain in his neck either.  Pain today is 0/10 at its worst is 4/10 Best 0/10.  Pain is worse when he does not do his physical therapy exercises and also with a bad sleep position.  Daily physical therapy is helpful as is heat and ice.  He is not taking any medications for pain.  He denies any bowel or bladder changes, fevers, chills, unintentional weight loss.  He does feel the pain now it is in the right side of his neck near the trapezial muscle area.    No myelopathic symptoms.     Evaluation to Date: X-ray of cervical spine dated 3/12/2024.  MRI of cervical spine dated 7/1/2024.     Treatment to Date: Chiropractic care.  Physical therapy.    Patient Active Problem List   Diagnosis     Class 1 obesity due to excess calories without serious comorbidity with body mass index (BMI) of 32.0 to 32.9 in adult     Moderate persistent asthma without complication     Chronic idiopathic gout     Family history of gout     Hypertension, unspecified type     Dyslipidemia     Multiple pigmented nevi     Family history of skin cancer     Disturbed concentration     Hyperuricemia     Serum calcium elevated       Current Outpatient Medications   Medication Sig Dispense Refill     albuterol (PROAIR HFA/PROVENTIL HFA/VENTOLIN HFA) 108 (90 Base) MCG/ACT inhaler Inhale 2 puffs into the lungs every 6 hours as needed for shortness of breath, wheezing  or cough 18 g 0     allopurinol (ZYLOPRIM) 300 MG tablet Take 1 tablet (300 mg) by mouth daily 90 tablet 1     fluticasone-salmeterol (ADVAIR DISKUS) 250-50 MCG/ACT inhaler Inhale 1 puff into the lungs every 12 hours 3 each 3     gabapentin (NEURONTIN) 300 MG capsule TAKE 3 CAPSULES BY MOUTH THREE TIMES DAILY (Patient not taking: Reported on 8/19/2024) 270 capsule 1     lisinopril (ZESTRIL) 10 MG tablet Take 1 tablet (10 mg) by mouth daily 90 tablet 1     No current facility-administered medications for this visit.       Allergies   Allergen Reactions     Minocycline Rash     GIVEN FOR ACNE WHEN A TEENAGER AND BROKE OUT IN [place]       Past Medical History:   Diagnosis Date     Chronic idiopathic gout involving toe without tophus     dx age 22 toe swleling,     Elevated blood pressure reading without diagnosis of hypertension 1/24/2022     Hypertension, unspecified type 4/28/2023     Moderate persistent asthma without complication     dx in middle school        Review of Systems  ROS: Specifically negative for bowel/bladder dysfunction, balance changes, headache, dizziness, foot drop, fevers, chills, appetite changes, nausea/vomiting, unexplained weight loss. Otherwise 13 systems reviewed are negative. Please see the patient's intake questionnaire from today for details.    Reviewed Social, Family, Past Medical and Past Surgical history with patient, no significant changes noted since prior visit.     Objective:     BP (!) 141/74   Pulse 64   SpO2 98%     PHYSICAL EXAMINATION:   --CONSTITUTIONAL: Vital signs as above. No acute distress. The patient is well nourished and well groomed.  --PSYCHIATRIC:  The patient is awake, alert, oriented to person, place, time and answering questions appropriately with clear speech. Appropriate mood and affect   --HEENT: Sclera are non-injected.   --SKIN: Skin over the face, bilateral upper extremities, and posterior torso is clean, dry, intact without rashes.  --RESPIRATORY:  Normal rhythm and effort. No abnormal accessory muscle breathing patterns noted.   --GROSS MOTOR: Easily arises from a seated position.   --CERVICAL SPINE: Inspection reveals no evidence of deformity. Range of motion is mildly limited in cervical flexion, extension, lateral rotation. No tenderness to palpation cervical spine.    --UPPER EXTREMITY MOTOR TESTING:  Wrist flexion left 5/5, right 5/5  Wrist extension left 5/5, right 5/5  Pronators left 5/5, right 5/5  Biceps left 5/5, right 5/5   Triceps left 5/5, right 5/5   Shoulder abduction left 5/5, right 5/5   left 5/5, right 5/5  --NEUROLOGIC:  Sensation to upper extremities is intact. Negative Boo's bilaterally.   --VASCULAR: Warm upper limbs bilaterally.     Imaging of the cervical spine: Cervical spine imaging was reviewed today. The images were shown to the patient and the findings were explained using a spine model.                Again, thank you for allowing me to participate in the care of your patient.        Sincerely,        Rayshawn Nieto, DO

## 2024-09-30 NOTE — PATIENT INSTRUCTIONS
~Please call Nurse Navigation line [phone] with any questions or concerns about your treatment plan, if symptoms worsen and you would like to be seen urgently, or if you have problems controlling bladder and bowel function.

## 2024-09-30 NOTE — PROGRESS NOTES
Assessment:     Diagnoses and all orders for this visit:  Cervical spondylosis without myelopathy  Cervical disc herniation  Myofascial pain     David Martin is a 29 year old y.o. male with past medical history significant for asthma, obesity, chronic idiopathic gout, hyperlipidemia, hyperuricemia, elevated calcium, hypertension, disturbed concentration who presents today for follow-up regarding neck and right upper extremity pain:     -Patient is doing quite well now.  Right upper extremity pain has improved and neck pain is also improved greatly.  His pain was likely secondary to C6 radiculopathy on the right.    Plan:     A shared decision making plan was used.  The patient's values and choices were respected. Prior medical records from our last visit on 5/3/2024 were reviewed today. The following represents what was discussed and decided upon by the provider and the patient.     -DIAGNOSTIC TESTS: Images were personally reviewed and interpreted.   --X-ray of the cervical spine dated 3/12/2024 is personally reviewed images interpreted and discussed with patient. There are mild spondylitic changes throughout the cervical spine with mild loss of disc height space on C5-6 with mild facet arthropathy throughout.   -- MRI of cervical spine dated 7/1/2024 is personally viewed images interpreted and discussed the patient.  There are mild degenerative changes at C5-6 there is mild spinal canal stenosis and mild neuroforaminal stenosis.     -INTERVENTIONS: No interventions at this time.    -MEDICATIONS: He is not on any medications for pain at this time.  -  Discussed side effects of medications and proper use. Patient verbalized understanding.    -PHYSICAL THERAPY: Recommended continue with physical therapy on a consistent basis at home.    -PATIENT EDUCATION: We discussed pain management in a multiple fashion including physical therapy, medication management, possible future injections.    -FOLLOW UP: Patient will  follow-up as needed.  Advised to contact clinic if symptoms worsen or change.    Subjective:     David Martin is a 29 year old male who presents today for follow-up regarding neck pain and right upper extremity pain.  Right upper extremity pain has resolved and does not come back.  Neck pain is also improving and most the time not having pain in his neck either.  Pain today is 0/10 at its worst is 4/10 Best 0/10.  Pain is worse when he does not do his physical therapy exercises and also with a bad sleep position.  Daily physical therapy is helpful as is heat and ice.  He is not taking any medications for pain.  He denies any bowel or bladder changes, fevers, chills, unintentional weight loss.  He does feel the pain now it is in the right side of his neck near the trapezial muscle area.    No myelopathic symptoms.     Evaluation to Date: X-ray of cervical spine dated 3/12/2024.  MRI of cervical spine dated 7/1/2024.     Treatment to Date: Chiropractic care.  Physical therapy.    Patient Active Problem List   Diagnosis    Class 1 obesity due to excess calories without serious comorbidity with body mass index (BMI) of 32.0 to 32.9 in adult    Moderate persistent asthma without complication    Chronic idiopathic gout    Family history of gout    Hypertension, unspecified type    Dyslipidemia    Multiple pigmented nevi    Family history of skin cancer    Disturbed concentration    Hyperuricemia    Serum calcium elevated       Current Outpatient Medications   Medication Sig Dispense Refill    albuterol (PROAIR HFA/PROVENTIL HFA/VENTOLIN HFA) 108 (90 Base) MCG/ACT inhaler Inhale 2 puffs into the lungs every 6 hours as needed for shortness of breath, wheezing or cough 18 g 0    allopurinol (ZYLOPRIM) 300 MG tablet Take 1 tablet (300 mg) by mouth daily 90 tablet 1    fluticasone-salmeterol (ADVAIR DISKUS) 250-50 MCG/ACT inhaler Inhale 1 puff into the lungs every 12 hours 3 each 3    gabapentin (NEURONTIN) 300 MG capsule  TAKE 3 CAPSULES BY MOUTH THREE TIMES DAILY (Patient not taking: Reported on 8/19/2024) 270 capsule 1    lisinopril (ZESTRIL) 10 MG tablet Take 1 tablet (10 mg) by mouth daily 90 tablet 1     No current facility-administered medications for this visit.       Allergies   Allergen Reactions    Minocycline Rash     GIVEN FOR [facility] WHEN A TEENAGER AND BROKE OUT IN [place]       Past Medical History:   Diagnosis Date    Chronic idiopathic gout involving toe without tophus     dx age 22 toe swleling,    Elevated blood pressure reading without diagnosis of hypertension 1/24/2022    Hypertension, unspecified type 4/28/2023    Moderate persistent asthma without complication     dx in middle school        Review of Systems  ROS: Specifically negative for bowel/bladder dysfunction, balance changes, headache, dizziness, foot drop, fevers, chills, appetite changes, nausea/vomiting, unexplained weight loss. Otherwise 13 systems reviewed are negative. Please see the patient's intake questionnaire from today for details.    Reviewed Social, Family, Past Medical and Past Surgical history with patient, no significant changes noted since prior visit.     Objective:     BP (!) 141/74   Pulse 64   SpO2 98%     PHYSICAL EXAMINATION:   --CONSTITUTIONAL: Vital signs as above. No acute distress. The patient is well nourished and well groomed.  --PSYCHIATRIC:  The patient is awake, alert, oriented to person, place, time and answering questions appropriately with clear speech. Appropriate mood and affect   --HEENT: Sclera are non-injected.   --SKIN: Skin over the face, bilateral upper extremities, and posterior torso is clean, dry, intact without rashes.  --RESPIRATORY: Normal rhythm and effort. No abnormal accessory muscle breathing patterns noted.   --GROSS MOTOR: Easily arises from a seated position.   --CERVICAL SPINE: Inspection reveals no evidence of deformity. Range of motion is mildly limited in cervical flexion, extension, lateral  rotation. No tenderness to palpation cervical spine.    --UPPER EXTREMITY MOTOR TESTING:  Wrist flexion left 5/5, right 5/5  Wrist extension left 5/5, right 5/5  Pronators left 5/5, right 5/5  Biceps left 5/5, right 5/5   Triceps left 5/5, right 5/5   Shoulder abduction left 5/5, right 5/5   left 5/5, right 5/5  --NEUROLOGIC:  Sensation to upper extremities is intact. Negative Boo's bilaterally.   --VASCULAR: Warm upper limbs bilaterally.     Imaging of the cervical spine: Cervical spine imaging was reviewed today. The images were shown to the patient and the findings were explained using a spine model.

## 2025-02-04 NOTE — PROGRESS NOTES
[facility] Dermatology Note  Encounter Date: Feb 6, 2025  Office Visit     Dermatology Problem List:  Last FBSE: 02/06/25   # Scalp folliculitis  - CLN shampoo    ____________________________________________    Assessment & Plan:     # Scalp folliculitis, mild  Chronic, intermittently flaring. Recommended starting OTC CLN shampoo.    - start CLN shampoo three times per week PRN. Samples provided    # Benign findings including keratosis pilaris, clinically atypical nevi, cherry angiomas.  Reassured patient regarding the benign nature of these findings.   - continue to monitor      Procedures Performed:   - none    Follow-up: 12m in person for FBSE, sooner with concerns    Staff: Dr. Rodney Reaves MD PGY-4  Dermatology Resident    ____________________________________________    CC: Skin Check (FBSE - no areas of concern)    HPI:  Mr. David Martin is a(n) 29 year old male who presents today as a new patient for skin check.     Patient requests a skin check today given numerous nevi. No personal hx skin cancer; his mother may have had a skin cancer removed from her forehead but he isn't sure. Wears sunscreen regularly.     Does get some scalp folliculitis. Has hx severe acne, under much better control now.    Patient is otherwise feeling well, without additional skin concerns.    Labs Reviewed:  N/A    Physical Exam:  Vitals: There were no vitals taken for this visit.  SKIN: Total skin excluding the undergarment areas was performed. The exam included the head/face, neck, both arms, chest, back, abdomen, both legs, digits and/or nails.   - a few pink inflammatory papules on the occipital scalp, left lateral neck  - pink perifollicular scaly 1-2 mm papules on the bilateral upper arms  - several bright red dome shaped papules on the trunk and extremities  - a few brown macules and papules with regular borders on the trunk and extremities  - No other lesions of concern on areas  examined.     Medications:  Current Outpatient Medications   Medication Sig Dispense Refill    albuterol (PROAIR HFA/PROVENTIL HFA/VENTOLIN HFA) 108 (90 Base) MCG/ACT inhaler Inhale 2 puffs into the lungs every 6 hours as needed for shortness of breath, wheezing or cough 18 g 0    allopurinol (ZYLOPRIM) 300 MG tablet Take 1 tablet (300 mg) by mouth daily 90 tablet 1    fluticasone-salmeterol (ADVAIR DISKUS) 250-50 MCG/ACT inhaler Inhale 1 puff into the lungs every 12 hours 3 each 3    lisinopril (ZESTRIL) 10 MG tablet Take 1 tablet (10 mg) by mouth daily 90 tablet 1    gabapentin (NEURONTIN) 300 MG capsule TAKE 3 CAPSULES BY MOUTH THREE TIMES DAILY (Patient not taking: Reported on 2/6/2025) 270 capsule 1     No current facility-administered medications for this visit.      Past Medical History:   Patient Active Problem List   Diagnosis    Class 1 obesity due to excess calories without serious comorbidity with body mass index (BMI) of 32.0 to 32.9 in adult    Moderate persistent asthma without complication    Chronic idiopathic gout    Family history of gout    Hypertension, unspecified type    Dyslipidemia    Multiple pigmented nevi    Family history of skin cancer    Disturbed concentration    Hyperuricemia    Serum calcium elevated     Past Medical History:   Diagnosis Date    Chronic idiopathic gout involving toe without tophus     dx age 22 toe swleling,    Elevated blood pressure reading without diagnosis of hypertension 1/24/2022    Hypertension, unspecified type 4/28/2023    Moderate persistent asthma without complication     dx in middle school       CC No referring provider defined for this encounter. on close of this encounter.   on close of this encounter.

## 2025-02-06 ENCOUNTER — OFFICE VISIT (OUTPATIENT)
Dept: DERMATOLOGY | Facility: CLINIC | Age: 30
End: 2025-02-06
Payer: COMMERCIAL

## 2025-02-06 DIAGNOSIS — D18.01 CHERRY ANGIOMA: ICD-10-CM

## 2025-02-06 DIAGNOSIS — L85.8 KP (KERATOSIS PILARIS): ICD-10-CM

## 2025-02-06 DIAGNOSIS — D22.9 BENIGN NEVUS OF SKIN: ICD-10-CM

## 2025-02-06 DIAGNOSIS — L73.9 FOLLICULITIS: Primary | ICD-10-CM

## 2025-02-06 PROCEDURE — 99203 OFFICE O/P NEW LOW 30 MIN: CPT | Mod: GC | Performed by: DERMATOLOGY

## 2025-02-06 ASSESSMENT — PAIN SCALES - GENERAL: PAINLEVEL_OUTOF10: NO PAIN (0)

## 2025-02-06 NOTE — PATIENT INSTRUCTIONS

## 2025-02-06 NOTE — NURSING NOTE
Dermatology Rooming Note    David Martin's goals for this visit include:   Chief Complaint   Patient presents with    Skin Check     FBSE - no areas of concern     Marlen Casas [place]

## 2025-02-06 NOTE — LETTER
2/6/2025       RE: David Martin  [id number]  [place] [address]     Dear Colleague,    Thank you for referring your patient, David Martin, to the [facility] [place] [facility]. Please see a copy of my visit note below.    [facility] Dermatology Note  Encounter Date: Feb 6, 2025  Office Visit     Dermatology Problem List:  Last FBSE: 02/06/25   # Scalp folliculitis  - CLN shampoo    ____________________________________________    Assessment & Plan:     # Scalp folliculitis, mild  Chronic, intermittently flaring. Recommended starting OTC CLN shampoo.    - start CLN shampoo three times per week PRN. Samples provided    # Benign findings including keratosis pilaris, clinically atypical nevi, cherry angiomas.  Reassured patient regarding the benign nature of these findings.   - continue to monitor      Procedures Performed:   - none    Follow-up: 12m in person for FBSE, sooner with concerns    Staff: Dr. Rodney Reaves MD PGY-4  Dermatology Resident    I have personally examined this patient and agree with the resident doctor's documentation and plan of care. I have reviewed and amended the resident's note above. The documentation accurately reflects my clinical observations, diagnoses, treatment and follow-up plans.     Perri Stevens MD  Dermatology Staff    ____________________________________________    CC: Skin Check (FBSE - no areas of concern)    HPI:  Mr. David Martin is a(n) 29 year old male who presents today as a new patient for skin check.     Patient requests a skin check today given numerous nevi. No personal hx skin cancer; his mother may have had a skin cancer removed from her forehead but he isn't sure. Wears sunscreen regularly.     Does get some scalp folliculitis. Has hx severe acne, under much better control now.    Patient is otherwise feeling well, without  additional skin concerns.    Labs Reviewed:  N/A    Physical Exam:  Vitals: There were no vitals taken for this visit.  SKIN: Total skin excluding the undergarment areas was performed. The exam included the head/face, neck, both arms, chest, back, abdomen, both legs, digits and/or nails.   - a few pink inflammatory papules on the occipital scalp, left lateral neck  - pink perifollicular scaly 1-2 mm papules on the bilateral upper arms  - several bright red dome shaped papules on the trunk and extremities  - a few brown macules and papules with regular borders on the trunk and extremities  - No other lesions of concern on areas examined.     Medications:  Current Outpatient Medications   Medication Sig Dispense Refill     albuterol (PROAIR HFA/PROVENTIL HFA/VENTOLIN HFA) 108 (90 Base) MCG/ACT inhaler Inhale 2 puffs into the lungs every 6 hours as needed for shortness of breath, wheezing or cough 18 g 0     allopurinol (ZYLOPRIM) 300 MG tablet Take 1 tablet (300 mg) by mouth daily 90 tablet 1     fluticasone-salmeterol (ADVAIR DISKUS) 250-50 MCG/ACT inhaler Inhale 1 puff into the lungs every 12 hours 3 each 3     lisinopril (ZESTRIL) 10 MG tablet Take 1 tablet (10 mg) by mouth daily 90 tablet 1     gabapentin (NEURONTIN) 300 MG capsule TAKE 3 CAPSULES BY MOUTH THREE TIMES DAILY (Patient not taking: Reported on 2/6/2025) 270 capsule 1     No current facility-administered medications for this visit.      Past Medical History:   Patient Active Problem List   Diagnosis     Class 1 obesity due to excess calories without serious comorbidity with body mass index (BMI) of 32.0 to 32.9 in adult     Moderate persistent asthma without complication     Chronic idiopathic gout     Family history of gout     Hypertension, unspecified type     Dyslipidemia     Multiple pigmented nevi     Family history of skin cancer     Disturbed concentration     Hyperuricemia     Serum calcium elevated     Past Medical History:   Diagnosis Date      Chronic idiopathic gout involving toe without tophus     dx age 22 toe swleling,     Elevated blood pressure reading without diagnosis of hypertension 1/24/2022     Hypertension, unspecified type 4/28/2023     Moderate persistent asthma without complication     dx in middle school       CC No referring provider defined for this encounter. on close of this encounter.      Again, thank you for allowing me to participate in the care of your patient.      Sincerely,    Sanjay Reaves MD

## 2025-02-24 ENCOUNTER — OFFICE VISIT (OUTPATIENT)
Dept: FAMILY MEDICINE | Facility: CLINIC | Age: 30
End: 2025-02-24
Payer: COMMERCIAL

## 2025-02-24 VITALS
DIASTOLIC BLOOD PRESSURE: 77 MMHG | TEMPERATURE: 97.3 F | BODY MASS INDEX: 35.81 KG/M2 | SYSTOLIC BLOOD PRESSURE: 124 MMHG | HEIGHT: 74 IN | WEIGHT: 279 LBS | OXYGEN SATURATION: 99 % | RESPIRATION RATE: 16 BRPM | HEART RATE: 57 BPM

## 2025-02-24 DIAGNOSIS — D22.9 MULTIPLE PIGMENTED NEVI: ICD-10-CM

## 2025-02-24 DIAGNOSIS — Z00.00 ROUTINE HISTORY AND PHYSICAL EXAMINATION OF ADULT: Primary | ICD-10-CM

## 2025-02-24 DIAGNOSIS — M1A.00X0 CHRONIC IDIOPATHIC GOUT: ICD-10-CM

## 2025-02-24 DIAGNOSIS — Z80.8 FAMILY HISTORY OF SKIN CANCER: ICD-10-CM

## 2025-02-24 DIAGNOSIS — Z13.1 SCREENING FOR DIABETES MELLITUS: ICD-10-CM

## 2025-02-24 DIAGNOSIS — I10 HYPERTENSION, UNSPECIFIED TYPE: ICD-10-CM

## 2025-02-24 DIAGNOSIS — Z00.00 HEALTH CARE MAINTENANCE: ICD-10-CM

## 2025-02-24 DIAGNOSIS — E79.0 HYPERURICEMIA: ICD-10-CM

## 2025-02-24 DIAGNOSIS — M47.812 CERVICAL SPONDYLOSIS WITHOUT MYELOPATHY: ICD-10-CM

## 2025-02-24 DIAGNOSIS — E83.52 SERUM CALCIUM ELEVATED: ICD-10-CM

## 2025-02-24 DIAGNOSIS — Z82.69 FAMILY HISTORY OF GOUT: ICD-10-CM

## 2025-02-24 DIAGNOSIS — R41.840 DISTURBED CONCENTRATION: ICD-10-CM

## 2025-02-24 DIAGNOSIS — E78.5 DYSLIPIDEMIA: ICD-10-CM

## 2025-02-24 DIAGNOSIS — Z71.89 ADVANCED DIRECTIVES, COUNSELING/DISCUSSION: ICD-10-CM

## 2025-02-24 DIAGNOSIS — J45.30 MILD PERSISTENT ASTHMA WITHOUT COMPLICATION: ICD-10-CM

## 2025-02-24 DIAGNOSIS — E66.811 CLASS 1 OBESITY DUE TO EXCESS CALORIES WITH SERIOUS COMORBIDITY AND BODY MASS INDEX (BMI) OF 32.0 TO 32.9 IN ADULT: ICD-10-CM

## 2025-02-24 DIAGNOSIS — E66.09 CLASS 1 OBESITY DUE TO EXCESS CALORIES WITH SERIOUS COMORBIDITY AND BODY MASS INDEX (BMI) OF 32.0 TO 32.9 IN ADULT: ICD-10-CM

## 2025-02-24 LAB
BASOPHILS # BLD AUTO: 0 10E3/UL (ref 0–0.2)
BASOPHILS NFR BLD AUTO: 1 %
CA-I BLD-MCNC: 5 MG/DL (ref 4.4–5.2)
EOSINOPHIL # BLD AUTO: 0.6 10E3/UL (ref 0–0.7)
EOSINOPHIL NFR BLD AUTO: 9 %
ERYTHROCYTE [DISTWIDTH] IN BLOOD BY AUTOMATED COUNT: 12.4 % (ref 10–15)
EST. AVERAGE GLUCOSE BLD GHB EST-MCNC: 114 MG/DL
HBA1C MFR BLD: 5.6 % (ref 0–5.6)
HCT VFR BLD AUTO: 41.4 % (ref 40–53)
HGB BLD-MCNC: 13.5 G/DL (ref 13.3–17.7)
IMM GRANULOCYTES # BLD: 0 10E3/UL
IMM GRANULOCYTES NFR BLD: 0 %
LYMPHOCYTES # BLD AUTO: 1.9 10E3/UL (ref 0.8–5.3)
LYMPHOCYTES NFR BLD AUTO: 30 %
MCH RBC QN AUTO: 28.2 PG (ref 26.5–33)
MCHC RBC AUTO-ENTMCNC: 32.6 G/DL (ref 31.5–36.5)
MCV RBC AUTO: 87 FL (ref 78–100)
MONOCYTES # BLD AUTO: 0.6 10E3/UL (ref 0–1.3)
MONOCYTES NFR BLD AUTO: 10 %
NEUTROPHILS # BLD AUTO: 3.3 10E3/UL (ref 1.6–8.3)
NEUTROPHILS NFR BLD AUTO: 51 %
PLATELET # BLD AUTO: 276 10E3/UL (ref 150–450)
RBC # BLD AUTO: 4.78 10E6/UL (ref 4.4–5.9)
WBC # BLD AUTO: 6.4 10E3/UL (ref 4–11)

## 2025-02-24 PROCEDURE — 99214 OFFICE O/P EST MOD 30 MIN: CPT | Mod: 25 | Performed by: FAMILY MEDICINE

## 2025-02-24 PROCEDURE — 84443 ASSAY THYROID STIM HORMONE: CPT | Performed by: FAMILY MEDICINE

## 2025-02-24 PROCEDURE — 80061 LIPID PANEL: CPT | Performed by: FAMILY MEDICINE

## 2025-02-24 PROCEDURE — 82043 UR ALBUMIN QUANTITATIVE: CPT | Performed by: FAMILY MEDICINE

## 2025-02-24 PROCEDURE — G2211 COMPLEX E/M VISIT ADD ON: HCPCS | Performed by: FAMILY MEDICINE

## 2025-02-24 PROCEDURE — 82330 ASSAY OF CALCIUM: CPT | Performed by: FAMILY MEDICINE

## 2025-02-24 PROCEDURE — 83970 ASSAY OF PARATHORMONE: CPT | Performed by: FAMILY MEDICINE

## 2025-02-24 PROCEDURE — 82570 ASSAY OF URINE CREATININE: CPT | Performed by: FAMILY MEDICINE

## 2025-02-24 PROCEDURE — 85025 COMPLETE CBC W/AUTO DIFF WBC: CPT | Performed by: FAMILY MEDICINE

## 2025-02-24 PROCEDURE — 82306 VITAMIN D 25 HYDROXY: CPT | Performed by: FAMILY MEDICINE

## 2025-02-24 PROCEDURE — 80053 COMPREHEN METABOLIC PANEL: CPT | Performed by: FAMILY MEDICINE

## 2025-02-24 PROCEDURE — 84550 ASSAY OF BLOOD/URIC ACID: CPT | Performed by: FAMILY MEDICINE

## 2025-02-24 PROCEDURE — 36415 COLL VENOUS BLD VENIPUNCTURE: CPT | Performed by: FAMILY MEDICINE

## 2025-02-24 PROCEDURE — 83036 HEMOGLOBIN GLYCOSYLATED A1C: CPT | Performed by: FAMILY MEDICINE

## 2025-02-24 PROCEDURE — 99395 PREV VISIT EST AGE 18-39: CPT | Mod: 25 | Performed by: FAMILY MEDICINE

## 2025-02-24 RX ORDER — LISINOPRIL 10 MG/1
10 TABLET ORAL DAILY
Qty: 90 TABLET | Refills: 1 | Status: SHIPPED | OUTPATIENT
Start: 2025-02-24

## 2025-02-24 SDOH — HEALTH STABILITY: PHYSICAL HEALTH: ON AVERAGE, HOW MANY DAYS PER WEEK DO YOU ENGAGE IN MODERATE TO STRENUOUS EXERCISE (LIKE A BRISK WALK)?: 3 DAYS

## 2025-02-24 ASSESSMENT — ASTHMA QUESTIONNAIRES
QUESTION_2 LAST FOUR WEEKS HOW OFTEN HAVE YOU HAD SHORTNESS OF BREATH: NOT AT ALL
QUESTION_5 LAST FOUR WEEKS HOW WOULD YOU RATE YOUR ASTHMA CONTROL: COMPLETELY CONTROLLED
ACT_TOTALSCORE: 24
ACT_TOTALSCORE: 24
QUESTION_4 LAST FOUR WEEKS HOW OFTEN HAVE YOU USED YOUR RESCUE INHALER OR NEBULIZER MEDICATION (SUCH AS ALBUTEROL): ONCE A WEEK OR LESS
QUESTION_1 LAST FOUR WEEKS HOW MUCH OF THE TIME DID YOUR ASTHMA KEEP YOU FROM GETTING AS MUCH DONE AT WORK, SCHOOL OR AT HOME: NONE OF THE TIME
QUESTION_3 LAST FOUR WEEKS HOW OFTEN DID YOUR ASTHMA SYMPTOMS (WHEEZING, COUGHING, SHORTNESS OF BREATH, CHEST TIGHTNESS OR PAIN) WAKE YOU UP AT NIGHT OR EARLIER THAN USUAL IN THE MORNING: NOT AT ALL

## 2025-02-24 ASSESSMENT — PAIN SCALES - GENERAL: PAINLEVEL_OUTOF10: NO PAIN (0)

## 2025-02-24 ASSESSMENT — SOCIAL DETERMINANTS OF HEALTH (SDOH): HOW OFTEN DO YOU GET TOGETHER WITH FRIENDS OR RELATIVES?: TWICE A WEEK

## 2025-02-24 NOTE — PROGRESS NOTES
The below note was dictated using voice recognition. Although reviewed after completion, some word and grammatical error may remain .        Preventive Care Visit  [facility]  Mariana Sherman MD, Family Medicine  Feb 24, 2025      Assessment & Plan     Routine history and physical examination of adult  Seen today for physical & follow up   reviewed  Encouraged working on healthcare directives and honoring choices given to review.  Reports no hernia and declines a  exam encourage self testicular check regularly.  Vaccines reviewed and reported had flu and COVID-vaccine in November no records seen on [facility], updated immunization manually with reported arbitrary date.  Will do lab work today and make further recommendations and refill meds once labs reviewed.    Asthma mild to moderate persistent well controlled, ACT =24, Asthma action plan place on Advair 250/50 prescribed 1 puff twice a day, reports no gaps in use. No side effects. Has held off on lung function test and pulmonary referral. Continues to vape marijuana & encouraged to quit. Uses albuterol when sick otherwise just once a month or less.  Has tried Advair for rescue and notes it works within 30 minutes as opposed to albuterol which works within 30 seconds but it is still useful.  Has not needed to use the albuterol inhaler much except only with intense activity.  Recently has been playing a lot of basketball which can be intense it feels great but has only required the extra Advair once.  May use albuterol prior to intense physical activity.  Currently has 100 puffs left on the albuterol inhaler and no refill needed yet.  Labs later came back with a normal red blood cell (Hgb) levels, normal white blood cell count and normal platelet levels.    Hypertension on lisinopril 10 mg started in march 2024. Stable kidney function in June 2024 on this med.  There is family history of hypertension.  Blood pressure today 144/71 recheck  124/77.  Has not been checking at home.  Encouraged home blood pressure checks and if consistently over 130 x 80 may benefit from increasing dose of lisinopril.  [place] decision made to continue 10 mg for now.  Encouraged a low-salt , low caffeine low alcohol in diet, increase regular physical activity and work to lose 10% of total body weight.  Advised to avoid NSAID's as much as possible.  Refill sent in and will adjust dose if lab work indicates.     BMI with comorbid HTN & dyslipidemia, now back to 35.  His height was marked a little less today.  Notes has been active at the gym playing basketball.  Is not as active like in the summertime.  Encouraged to lower carbohydrate intake, smaller portions, limit alcohol to 1 drink in a 24 timeframe no more than 3 times a week and avoid vaping cannabis as much as possible as it increases appetite. Currently declines weight loss medications or referral to a weight clinic.  After the visit some of the labs came back with a normal hemoglobin A1c of 5.6.       Dyslipidemia: hx of elevated triglycerides and low HDL, & normal LFT's.  Check lipids today and go from there.  Encouraged to continue regular exercising 150 minutes of aerobic exercise per week & weight loss will help.      Chronic idiopathic gout and hyperuricemia, asymptomatic on allopurinol 300 mg daily, (increased in march 2024) then in June 2024 uric acid improved but still not goal at 7.5.  When seen in August 2024 reported no symptoms, discussed options and decided to stop red meat and recheck in 6 months.  Continues to drink alcohol about 2 nights a week, a glass of whiskey or couple beers.  Encouraged cutting back on alcohol and sugary foods.  Will check uric acid level today and refill med after that reviewed in case dose needs adjusting.     Right upper extremity pain & hx of cervical spondylosis. Pain noted worse if sitting or standing typing at desk only.  Hx of prior left shoulder pain and prior ac  separation and surgery in the past. Previously would go in to see chiro as needed but symptoms did not get better with chiro manipulation and then seen in nov 2023 by PMDR & then by spine in early march 2024. Xray done showed normal cervical spine alignment. Mild loss of disc height at C5-6. Scattered mild degenerative changes including facet arthropathy, uncinate hypertrophy, and anterior endplate osteophytosis. No prevertebral edema. No mass in the visualized lung apices. Was given gabapentin 300 mg told to titrate up to tid.he took it for a while just at bedtime as it made him feel loopy. He returned to PMDR in may 2024 and MRI ordered & MRI done in 7/2024 showed mild cervical degenerative changes at C5-6 where there was mild spinal canal narrowing and mild bilateral neural foraminal narrowing but no myelopathic cord signal. He did PT and exercises and stretching daily and it got better. Seen by the pain clinic 9/30/2024 for cervical spondylosis without myelopathy noted prior cervical disc herniation and resolved C6 radiculopathy.  Was advised to continue home exercises which were helping and even though reported had not been using gabapentin for a while and had no pain, gabapentin was renewed at 3 times a day given 270 with refills.  Reports no pain and reports has not taken the gabapentin at all in a while.  [facility] shows received gabapentin 300 mg #270 on 3/6/2024, 4/14/2024 and 2/13/2025.  If not taking in the future will remove this off the med list so does not get automatically refilled.     Elevated serum calcium noted in April 2023 and again in June 2023 though ionized calcium at that time was normal.  Recheck calcium along with ionized calcium, vitamin D and parathyroid in nov 2023 had been normal. In march 2024  Calcium again came back elevated & recheck in June showed mildly elevated calcium of 10.1,with normal vit d of 24, low pth of 10 and a normal ionized calcium. Asymptomatic. Had offered an  e consult to endo but did not hear back. Is not on any supplements.  Has been asymptomatic and opted just to monitor. So will recheck labs today and go from there.    Noted history of trouble concentrating in the past, reported no depression or anxiety. Referred for diagnostic eval and after several months, did get an evaluation at [facility] in 2022, records were not available but reported was told had a neurocognitive processing disorder, related to processing speed. Had had no trouble taking tests in the past.  Instead procrastination was the bigger issue. Referred to a community psychiatry to further evaluate and treat. No apt with psyche made. Did start therapy for skills in 8/2023. Had been doing therapy for the ADHD like symptoms & felt it went well. 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  Was advised by his therapist to talk about meds and was encouraged at last two visits to call [facility] where had diagnostic assessment about setting up an appointment with a psychiatrist there to discuss medications if indicated. He continued with skills based CBT April through May 2024, then was no show and not seen since. In 8/2024 noted was no longer desiring to see psyche for eval of an ADHD dx. He felt the therapy had been helpful but couldn't get to it in the summer when busy & planned to pick it back up in the fall.  He ended up not returning to them. 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     Multiple moles and family history of skin cancer, No longer with the patch in left groin area, previously treated with an antifungal cream. Seen by dermatology 2/6/2024 & noted mild scalp folliculitis and given clinda shampoo to use 3 times a week.   Also noted keratosis pilaris and reassured on benign skin findings & to follow-up with derm in 1 year for skin check given family history.     Return in 6 months and an office visit to follow-up on hypertension, asthma and gout excetra.  - Hemoglobin A1c; Future  - PRIMARY CARE FOLLOW-UP SCHEDULING; Future  - Hemoglobin A1c    Mild persistent asthma without complication  Asthma mild to moderate persistent well controlled, ACT =24, Asthma action plan place on Advair 250/50 prescribed 1 puff twice a day, reports no gaps in use. No side effects. Has held off on lung function test and pulmonary referral. Continues to vape marijuana & encouraged to quit. Uses albuterol when sick otherwise just once a month or less.  Has tried Advair for rescue and notes it works within 30 minutes as opposed to albuterol which works within 30 seconds but it is still useful.  Has not needed to use the albuterol inhaler much except only with intense activity.  Recently has been playing a lot of basketball which can be intense it feels great but has only required the extra Advair once.  May use albuterol prior to intense physical activity.  Currently has 100 puffs left on the albuterol inhaler and no refill needed yet.  Labs later came back with a normal red blood cell (Hgb) levels, normal white blood cell count and normal platelet levels.  - CBC with Platelets & Differential; Future  - CBC with Platelets & Differential    Hypertension, unspecified type  Hypertension on lisinopril 10 mg started in march 2024. Stable kidney function in June 2024 on this med.  There is family history of hypertension.  Blood pressure today 144/71 recheck 124/77.  Has not been checking at home.  Encouraged home blood pressure checks and if consistently over 130 x 80 may benefit from increasing dose of lisinopril.  [place] decision made to continue 10 mg for now.  Encouraged a low-salt , low caffeine low alcohol in diet, increase regular physical activity and work  to lose 10% of total body weight.  Advised to avoid NSAID's as much as possible.  Refill sent in and will adjust dose if lab work indicates.  - Comprehensive metabolic panel; Future  - TSH with free T4 reflex; Future  - Albumin Random Urine Quantitative with Creat Ratio; Future  - lisinopril (ZESTRIL) 10 MG tablet; Take 1 tablet (10 mg) by mouth daily.  - Comprehensive metabolic panel  - TSH with free T4 reflex  - Albumin Random Urine Quantitative with Creat Ratio    Class 1 obesity due to excess calories with serious comorbidity and body mass index (BMI) of 32.0 to 32.9 in adult  BMI with comorbid HTN & dyslipidemia, now back to 35.  His height was marked a little less today.  Notes has been active at the gym playing basketball.  Is not as active like in the summertime.  Encouraged to lower carbohydrate intake, smaller portions, limit alcohol to 1 drink in a 24 timeframe no more than 3 times a week and avoid vaping cannabis as much as possible as it increases appetite. Currently declines weight loss medications or referral to a weight clinic.  After the visit some of the labs came back with a normal hemoglobin A1c of 5.6.    - Comprehensive metabolic panel; Future  - TSH with free T4 reflex; Future  - Lipid panel reflex to direct LDL Fasting; Future  - Comprehensive metabolic panel  - TSH with free T4 reflex  - Lipid panel reflex to direct LDL Fasting    Dyslipidemia  Dyslipidemia: hx of elevated triglycerides and low HDL, & normal LFT's.  Check lipids today and go from there.  Encouraged to continue regular exercising 150 minutes of aerobic exercise per week & weight loss will help.    - Comprehensive metabolic panel; Future  - TSH with free T4 reflex; Future  - Lipid panel reflex to direct LDL Fasting; Future  - Comprehensive metabolic panel  - TSH with free T4 reflex  - Lipid panel reflex to direct LDL Fasting    Hyperuricemia  Chronic idiopathic gout  Family history of gout  Chronic idiopathic gout and  hyperuricemia, asymptomatic on allopurinol 300 mg daily, (increased in march 2024) then in June 2024 uric acid improved but still not goal at 7.5.  When seen in August 2024 reported no symptoms, discussed options and decided to stop red meat and recheck in 6 months.  Continues to drink alcohol about 2 nights a week, a glass of whiskey or couple beers.  Encouraged cutting back on alcohol and sugary foods.  Will check uric acid level today and refill med after that reviewed in case dose needs adjusting.  - Comprehensive metabolic panel; Future  - Uric acid; Future  - Comprehensive metabolic panel  - Uric acid    Cervical spondylosis without myelopathy  Right upper extremity pain & hx of cervical spondylosis. Pain noted worse if sitting or standing typing at desk only.  Hx of prior left shoulder pain and prior ac separation and surgery in the past. Previously would go in to see chiro as needed but symptoms did not get better with chiro manipulation and then seen in nov 2023 by PMDR & then by spine in early march 2024. Xray done showed normal cervical spine alignment. Mild loss of disc height at C5-6. Scattered mild degenerative changes including facet arthropathy, uncinate hypertrophy, and anterior endplate osteophytosis. No prevertebral edema. No mass in the visualized lung apices. Was given gabapentin 300 mg told to titrate up to tid.he took it for a while just at bedtime as it made him feel loopy. He returned to [facility] in may 2024 and MRI ordered & MRI done in 7/2024 showed mild cervical degenerative changes at C5-6 where there was mild spinal canal narrowing and mild bilateral neural foraminal narrowing but no myelopathic cord signal. He did PT and exercises and stretching daily and it got better. Seen by the pain clinic 9/30/2024 for cervical spondylosis without myelopathy noted prior cervical disc herniation and resolved C6 radiculopathy.  Was advised to continue home exercises which were helping and even though  reported had not been using gabapentin for a while and had no pain, gabapentin was renewed at 3 times a day given 270 with refills.  Reports no pain and reports has not taken the gabapentin at all in a while.  [place]  shows received gabapentin 300 mg #270 on 3/6/2024, 4/14/2024 and 2/13/2025.  If not taking in the future will remove this off the med list so does not get automatically refilled.     Serum calcium elevated  Elevated serum calcium noted in April 2023 and again in June 2023 though ionized calcium at that time was normal.  Recheck calcium along with ionized calcium, vitamin D and parathyroid in nov 2023 had been normal. In march 2024  Calcium again came back elevated & recheck in June showed mildly elevated calcium of 10.1,with normal vit d of 24, low pth of 10 and a normal ionized calcium. Asymptomatic. Had offered an e consult to santy but did not hear back. Is not on any supplements.  Has been asymptomatic and opted just to monitor. So will recheck labs today and go from there.  - Comprehensive metabolic panel; Future  - Ionized Calcium; Future  - Parathyroid Hormone Intact; Future  - Vitamin D Deficiency; Future  - Comprehensive metabolic panel  - Ionized Calcium  - Parathyroid Hormone Intact  - Vitamin D Deficiency    Disturbed concentration  Noted history of trouble concentrating in the past, reported no depression or anxiety. Referred for diagnostic eval and after several months, did get an evaluation at [facility] in 2022, records were not available but reported was told had a neurocognitive processing disorder, related to processing speed. Had had no trouble taking tests in the past.  Instead procrastination was the bigger issue. Referred to a community psychiatry to further evaluate and treat. No apt with psyche made. Did start therapy for skills in 8/2023. Had been doing therapy for the ADHD like symptoms & felt it went well. 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  Was advised by his therapist to talk about meds and was encouraged at last two visits to call [facility] where had diagnostic assessment about setting up an appointment with a psychiatrist there to discuss medications if indicated. He continued with skills based CBT April through May 2024, then was no show and not seen since. In 8/2024 noted was no longer desiring to see psyche for eval of an ADHD dx. He felt the therapy had been helpful but couldn't get to it in the summer when busy & planned to pick it back up in the fall.  He ended up not returning to them. 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  - TSH with free T4 reflex; Future  - TSH with free T4 reflex    Multiple pigmented nevi  Family history of skin cancer  Multiple moles and family history of skin cancer, No longer with the patch in left groin area, previously treated with an antifungal cream. Seen by dermatology 2/6/2024 & noted mild scalp folliculitis and given clinda shampoo to use 3 times a week.  Also noted keratosis pilaris and reassured on benign skin findings & to follow-up with derm in 1 year for skin check given family history.    Health care maintenance  HM reviewed  Encouraged working on healthcare directives and honoring choices given to review.  Reports no hernia and declines a  exam encourage self testicular check regularly.  Vaccines reviewed and reported had flu and COVID-vaccine in November no records seen on [facility], updated immunization manually with reported arbitrary date.  Will do lab work today and make further recommendations and refill meds once labs reviewed.  Return in 6 months and an office visit to follow-up on hypertension, asthma and gout excetra.  - REVIEW OF HEALTH  "MAINTENANCE PROTOCOL ORDERS    Advanced directives, counseling/discussion    Screening for diabetes mellitus  - Hemoglobin A1c; Future  - Hemoglobin A1c    Patient has been advised of split billing requirements and indicates understanding: Yes    BMI  Estimated body mass index is 35.54 kg/m  as calculated from the following:    Height as of this encounter: 1.887 m (6' 2.29\").    Weight as of this encounter: 126.6 kg (279 lb).   Weight management plan: Discussed healthy diet and exercise guidelines    Counseling  Appropriate preventive services were addressed with this patient via screening, questionnaire, or discussion as appropriate for fall prevention, nutrition, physical activity, Tobacco-use cessation, social engagement, weight loss and cognition.  Checklist reviewing preventive services available has been given to the patient.  Reviewed patient's diet, addressing concerns and/or questions.   He is at risk for lack of exercise and has been provided with information to increase physical activity for the benefit of his well-being.   Work on weight loss  Regular exercise  See Patient Instructions  The longitudinal plan of care for the diagnosis(es)/condition(s) as documented were addressed during this visit. Due to the added complexity in care, I will continue to support David in the subsequent management and with ongoing continuity of care.      Subjective   David is a 29 year old, presenting for the following:  Asthma, Hypertension, Physical, and Results (From derm.)        2/24/2025     2:29 PM   Additional Questions   Roomed by Juliane BAIG    Health Care Directive  Patient does not have a Health Care Directive: Discussed advance care planning with patient; information given to patient to review.      2/24/2025   General Health   How would you rate your overall physical health? Good   Feel stress (tense, anxious, or unable to sleep) Not at all         2/24/2025   Nutrition   Three or more servings of " calcium each day? Yes   Diet: Low salt   How many servings of fruit and vegetables per day? (!) 2-3   How many sweetened beverages each day? 0-1         2/24/2025   Exercise   Days per week of moderate/strenuous exercise 3 days         2/24/2025   Social Factors   Frequency of gathering with friends or relatives Twice a week   Worry food won't last until get money to buy more No   Food not last or not have enough money for food? No   Do you have housing? (Housing is defined as stable permanent housing and does not include staying outside in a car, in a tent, in an abandoned building, in an overnight shelter, or couch-surfing.) Yes   Are you worried about losing your housing? No   Lack of transportation? No   Unable to get utilities (heat,electricity)? No         2/24/2025   Dental   Dentist two times every year? Yes         3/18/2024   TB Screening   Were you born outside of the [place]? No     Today's PHQ-2 Score:       2/24/2025     2:23 PM   PHQ-2 ( 1999 [employer])   Q1: Little interest or pleasure in doing things 0   Q2: Feeling down, depressed or hopeless 0   PHQ-2 Score 0    Q1: Little interest or pleasure in doing things Not at all   Q2: Feeling down, depressed or hopeless Not at all   PHQ-2 Score 0       Patient-reported         2/24/2025   Substance Use   Alcohol more than 3/day or more than 7/wk No   Do you use any other substances recreationally? (!) CANNABIS PRODUCTS     Social History     Tobacco Use    Smoking status: Never    Smokeless tobacco: Never   Vaping Use    Vaping status: Some Days    Substances: THC   Substance Use Topics    Alcohol use: Yes     Comment: 3 ot 5 a week    Drug use: Yes     Types: Marijuana           2/24/2025   STI Screening   New sexual partner(s) since last STI/HIV test? No         2/24/2025   Contraception/Family Planning   Questions about contraception or family planning No        Reviewed and updated as needed this visit by Provider   Tobacco  Allergies  Meds  Problems  Med  Hx  Surg Hx             Past Medical History:   Diagnosis Date    Chronic idiopathic gout involving toe without tophus     dx age 22 toe swleling,    Elevated blood pressure reading without diagnosis of hypertension 2022    Hypertension, unspecified type 2023    Moderate persistent asthma without complication     dx in middle school     Past Surgical History:   Procedure Laterality Date     CIRCUMCISION      SHOULDER SURGERY Left 2017    left shoulder AC grade 5 separation repair    WISDOM TOOTH EXTRACTION       OB History   No obstetric history on file.     Lab work is in process  Labs reviewed in EPIC  BP Readings from Last 3 Encounters:   25 124/77   24 (!) 141/74   24 128/80    Wt Readings from Last 3 Encounters:   25 126.6 kg (279 lb)   24 119.3 kg (263 lb)   24 123.8 kg (273 lb)         Patient Active Problem List   Diagnosis    Class 1 obesity due to excess calories with serious comorbidity and body mass index (BMI) of 32.0 to 32.9 in adult    Moderate persistent asthma without complication    Chronic idiopathic gout    Family history of gout    Hypertension, unspecified type    Dyslipidemia    Multiple pigmented nevi    Family history of skin cancer    Disturbed concentration    Hyperuricemia    Serum calcium elevated    Cervical spondylosis without myelopathy     Past Surgical History:   Procedure Laterality Date     CIRCUMCISION      SHOULDER SURGERY Left 2017    left shoulder AC grade 5 separation repair    WISDOM TOOTH EXTRACTION         Social History     Tobacco Use    Smoking status: Never    Smokeless tobacco: Never   Substance Use Topics    Alcohol use: Yes     Comment: 3 ot 5 a week     Family History   Problem Relation Age of Onset    Thyroid Disease Mother     Gout Father     Aortic Valve Replacement Father     Hypertension Father     Hyperlipidemia Father     Asthma Father     Kidney Disease Maternal Grandmother         had one kidney  removed du eto disease    Breast Cancer Maternal Aunt 40    Breast Cancer Other     Melanoma No family hx of     Skin Cancer No family hx of          Current Outpatient Medications   Medication Sig Dispense Refill    albuterol (PROAIR HFA/PROVENTIL HFA/VENTOLIN HFA) 108 (90 Base) MCG/ACT inhaler Inhale 2 puffs into the lungs every 6 hours as needed for shortness of breath, wheezing or cough 18 g 0    allopurinol (ZYLOPRIM) 300 MG tablet Take 1 tablet (300 mg) by mouth daily 90 tablet 1    fluticasone-salmeterol (ADVAIR DISKUS) 250-50 MCG/ACT inhaler Inhale 1 puff into the lungs every 12 hours 3 each 3    lisinopril (ZESTRIL) 10 MG tablet Take 1 tablet (10 mg) by mouth daily 90 tablet 1    gabapentin (NEURONTIN) 300 MG capsule TAKE 3 CAPSULES BY MOUTH THREE TIMES DAILY (Patient not taking: Reported on 8/19/2024) 270 capsule 1     Allergies   Allergen Reactions    Minocycline Rash     GIVEN FOR ACNE WHEN A TEENAGER AND BROKE OUT IN [place]     Recent Labs   Lab Test 06/21/24  1149 03/18/24  1546 04/28/23  0953 01/30/23  1444 03/03/22  0952 01/24/22  1242   A1C  --  5.4  --  5.3  --  5.4   LDL  --  90  --  87  --  84   HDL  --  39*  --  39*  --  44   TRIG  --  209*  --  285*  --  184*   ALT  --  29  --  27  --  30   CR 0.86 0.99   < > 0.86   < > 0.84   GFRESTIMATED >90 >90   < > >90   < > >90   POTASSIUM 4.3 4.4   < > 4.5   < > 4.3   TSH  --  2.50  --  1.95  --  1.57    < > = values in this interval not displayed.      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  [place]  negative.  Moved to [place] in , no prior records available, no regular care prior to that, Seen in urgent care 2019 for toe pain due to history of gout treated with prednisone, tested positive for COVID in 2020 and 2021.  Seen once in [place] on 2021 in urgent care for left toe pain and prior history of gout and given prednisone for 5 days.       Seen first time 22 to establish care and for preventive health and additional concerns. Advised self testicular check regularly. Labs done. Health care maintenance reviewed. Discussed working on healthcare directives.  Was given the Flu & pneumonia vaccines. He was to clarify when he last had Tdap & noted Covid vaccine x 3 up-to-date. Asthma was uncontrolled as had run out of his controller meds 6 months prior, noting shortness of breath with activity limiting exercise, resumed generic Wixela  250/50, 1 puff twice a day & if not better can do lung function tests and refer to pulmonary. Noted elevated blood pressure could be white coat hypertension & or  due to weight.  Recommended a low-salt diet, limiting alcohol to 1 drink a day no more than 7 total a week, 10% of total body weight loss & increasing aerobic activity, encouraged to katrina a home blood pressure machine and checking it at different times different days and calling us if it was consistently more than 130 x 80.  If persistently elevated despite lifestyle changes then to consider medications. Discussed BMI, diet, exercise weight loss, avoiding marijuana as much as possible as this could increase weight gain over time. Reviewed his history of gout diagnosed clinically based on toe pain in the past and family history of gout in dad.  Recent  joint pain had resolved.  Had made some recent lifestyle & dietary changes. Would avoid as needed prednisone as much as possible given long-term side effect (can worsen weight gain increases risk of diabetes, ulcers), may use indomethacin he has at home as needed.  Always try to use the lowest dose for the shortest duration of time as Indocin like other NSAID's increased risk of GI bleeding and heart attack.  Other options could be colchicine prn.  Side effects would be nausea vomiting diarrhea etc.  We can also consider referral to rheumatologist if necessary. History of trouble focusing now affecting job: referred to a psychologist for diagnostic evaluation of ADHD.  Encouraged to avoid all marijuana prior to evaluation so they can get an unbiased evaluation.  Based on results could make recommendations for either skill base treatment and/or referral to a community psychiatrist for medication management.  Some medications like stimulants could make blood pressure worse so this might be tricky if his blood pressure remained elevated. Was to return in 1 month for blood pressure check, asthma follow-up etc.   Lab showed a normal cbc, TSH, HBA1c, Micro albumin, lipids other than elevated TG and uric acd was elevated at 8.1 & allopurinol started.     Seen  3/3/2022  & noted Asthma was better. ACT up from 13 to 17. Had only restarted generic Advair 1.5 weeks prior and expected to further improve with time. Continued on generic Advair 1 puff twice a day ( turned out with his new insurance was cheaper than Wixela previously given). Had been on Advair before with good control in the past. Encouraged not to use albuterol first thing in am but use the Advair instead then see if albuterol needed. Goal was to get to point where albuterol only used as rescue and one inhaler last 1 year preferably.  Encouraged to use albuterol prior to exercise if indicated. Lung function tests ordered. Felt no need for pulmonary referral yet.  Encouraged to avoid Vaping/ inhaling cannabis to protect lungs. BP was better, & advised to continue with low salt, low alcohol diet, there was no indication to check an ultrasound or do labs to search for secondary causes of elevated BP at the time. BMI had improved, was to continue to work on diet, exercise and losing 10 % of total body weight over the year and rechecking TG at next physical.  Gout noted stable on allopurinol 100 mg, uric acid level checked to see if dose needed adjustment, was to continue with a hypouricemic diet ( low red meat, low alcohol). Use indomethacin sparingly as could raise BP( not required in a while) & felt no indication currently to see a rheumatologist. Noted his diagnostic eval for decreased concentration was scheduled in May outside [place]. Tdap was  given & advised to check in virtually in 4 weeks or so for asthma check in. BMP came back normal and uric acid was down from 8.1 to 7.6 and continued on allopurinol 100 mg daily.     Seen virtually 4/8/22 noted asthma was doing better. Continued on Advair twice a day & refill sent in 1 yr, to Use albuterol as rescue. Had not scheduled PFT's yet. Felt no indication to see a pulmonologist. Gout was stable on allopurinol 100 mg daily. Uric acid improved from original. Goal was eventually keep it less than 6.5 to prevent attacks. Was to continue with a low uric acid level in diet ( diet / lifestyle measures). Was to get a diagnostic assessment for ADHD eval in may. Advised to return in 5 months for asthma follow up.   No follow up made. Treated for Covid in sept with paxlovid. Noted albuterol inhaler refilled 1/24/22, 2/3/22, 5/27/22, 6/29/22,8/11/22, 9/2/22, 10/22/22 & 1/19/23 and advised follow up.      Seen 1/20/23 for preventive health. Healthcare maintenance reviewed. Did not have healthcare directives and honoring choices given to review. Flu shot up-to-date. COVID-vaccine primary and booster series up-to-date. Prevnar given.  Recommended hepatitis B vaccination felt may have had it when younger, was to check records with mom from [place] where grew up and if unable to find to do immune testing in the future and go from there. Moderate persistent asthma  improved from a year ago but still not at goal.  Continued on Advair 250/50 1 puff twice daily rinse mouth well after using inhaler. To limit use of albuterol as rescue only, had been filled 8 times in the last year which suggested high use & need for better asthma control. Initially filling freq was due to not being on Advair then due to losing inhalers at least 3 different times. To recheck asthma in 3 months.  Encouraged to schedule lung function tests  In the meantime encouraged to avoid Vaping and work on weight loss which may help.  If asthma scores continued to be less than 20 to consider changing Advair to Breo, & seeing a pulmonologist to further evaluate and treat.  Asthma action plan verbally in place.  Prevnar 20 given.  Noted had 3 refills on Advair  and albuterol recently refilled so he was to contact us when these were needed. Chronic idiopathic gout and family history of gout  asymptomatic without any flares on allopurinol 100 mg daily.  Had not required indomethacin use in a long time.  Advised to continue to stay well-hydrated, drink alcohol in moderation, decrease red meat consumption and continue current dosing of allopurinol and will check uric acid levels and make further recommendations.  Refill sent in.   New dx of HTN made. Blood pressure elevated last year and again in jan  Recheck better but still borderline.  Thought due to white coat hypertension/anxiety.  Did have family history of hypertension and genetic predisposition for this. Opted to hold off on medication for now.  EKG did not show any effect of blood pressure on the heart.  Recommended a low-salt diet, low alcohol intake, low caffeine intake, increase physical activity, at least 5 to 10% of total  body weight loss to make a difference in blood pressure, avoid anti-inflammatories as much as possible, given blood pressure would be hesitant to be prescribing any stimulants but could discuss that more with a psychiatrist when saw them.  Prescription given for home blood pressure machine.  To check blood pressure at home if consistently over 130 x 80 may benefit from medication.  To return in 3 months for blood pressure check and if still elevated consider medication at that time. BMI more than 35, reported no significant snoring.  To sleep on side, work on weight loss, low-carb low-fat diet & Increase physical activity. Reviewed multiple pigmented moles and family history of skin cancer & referred to dermatology for skin check. Noted history of trouble concentrating in the past reported no depression or anxiety.  Referred for diagnostic eval and after several months [facility] but reported was told had a neurocognitive processing disorder related to processing speed.  Had had no trouble taking tests in the past.  Instead procrastination was the bigger issue. Referred to a community psychiatry to further evaluate and treat. Noted history of COVID treated with Paxlovid in September 2022 with no residual symptoms. Micro albumin was normal, HDL low, triglycerides elevated, CMP otherwise unremarkable, TSH normal, uric acid 7.8, CBC and hemoglobin A1c normal.  On 2/24/2023 called about an albuterol refill.  Advised pulmonary referral and PFT's as recently filled in January. These were not scheduled.     Seen 4/28/23, Asthma better, continued on Advair 250/50 1 puff twice a day, to rinse well after using. Use albuterol only for rescue, use had decreased, Reminded to schedule lung function tests. Held off on pulmonary referral. Encouraged to avoid Vaping. Refills for Advair sent in 1 yr. Sent in one albuterol inhaler for rescue. Had a lot left on one sent in feb.  BP  high, recheck better. Encouraged low salt, low caffeine, low alcohol in diet and increased regular physical activity and work to loose 10 % of total body weight. Opted no meds. To recheck in 6 months. Encouraged to get a home blood pressure machine.  To check blood pressure at home if consistently over 130 x 80 may benefit from medication.   Dyslipidemia, / BMI >34, weight down a bit, to continue to work on diet, exercise, low carb, smaller portion, more plant based mediterranean diet and weight loss. To recheck in jan at next physical   Gout better asymptomatic on allopurinol 100 mg daily, noted was taking more consistently. Had had no gout attacks recently was to wait to see labs to refill med in case dose needed changing. To avoid indocin and other NSAID'S as much as possible as they could raise BP  Reminded to schedule with derm for fh of skin cancer and multiple moles. Sun protection advised. Was to follow up mental health as planned for trouble concentrating and procrastination in August. Checked for hep B immunity as awaited his mom to send us his childhood immunizations, if not immune to consider revaccination against hep B     BMP showed mildly elevated calcium advised to recheck in 1 month.  Uric acid was elevated at 7.8.  Recheck labs on 6/16 showed calcium of 10.1 ionized calcium normal uric acid elevated at 7.6 and not immune to hepatitis B.  Allopurinol increased to 200 mg on 6/29 and to recheck labs at follow-up.  No appointment with the community psychiatrist May did start seeing a therapist on 8/22 for ADHD related symptoms.  Seen them 9/1, 9/22, 10/6, 10/22 and most recently 11/17/2023.     Seen 11/20/23 Asthma well controlled, on Advair 250/50 1 puff twice a day, to rinse well after use, had decreased use of albuterol as needed less than once to twice a month.  Or prior to exercise. Held off on lung function test and pulmonary referral though may be worth getting a baseline lung function test in  the new year. Reported was vaping less. Had enough refills till May.  Hypertension  borderline BP recheck better.  Encouraged a low-salt low caffeine low alcohol in diet, increase regular physical activity and work to lose 10% of total body weight. Opted not to take a medication.  Recommended checking blood pressure at home goal less than 130 x 80.  Avoid NSAID's as much as possible. If not better consider medication.  History of dyslipidemia elevated triglycerides and low HDL, BMI over 35 with elevated blood pressure, options discussed held off on meds or a weight specialist referral. To continue to work on diet exercise low carb intake smaller portions more plant-based and some weight loss.  Chronic idiopathic gout and hyperuricemia asymptomatic on allopurinol 200 mg daily.  Avoiding Indocin and other NSAID's as much as possible as it could raise blood pressure.  to avoid red meat as much as possible and limit alcohol intake.  Elevated calcium noted in April and again in June though ionized calcium at that time had been normal. To recheck calcium along with vitamin D and parathyroid  and if persistently elevated will refer to endocrine.  Noted history of trouble concentrating in the past reported no depression or anxiety.  Referred for diagnostic eval and after several months did get an evaluation at [facility] in 2022, records were not available but reported was told had a neurocognitive processing disorder related to processing speed. Had had no trouble taking tests in the past.  Instead procrastination was the bigger issue. Referred to a community psychiatry to further evaluate and treat. No apt with psyche made. Did start therapy for skills in 8/2023. Had been doing therapy for the ADHD like symptoms & felt was going well. He got rid of external distractions and still finding tough time concentrating.  Mind would go to daydreaming when trying to do code.  Was advised by therapist to talk about meds and encouraged  he call [facility] to make an appointment with psychiatrist there to discuss medications   Multiple moles and family history of skin cancer encouraged to schedule with dermatology. New patch left groin area was most suggestive of a fungal infection like tinea corporis or jock itch.  Advised athlete's foot ointment over-the-counter twice a day to area up to 1 month after resolved and follow-up with Derm on this.  Flu shot, COVID-vaccine & Hepatitis B booster given as not immune despite prior vaccination and will check titers at physical to see if this booster gives adequate immunity.  Was to return in January /February 2024 for preventive physical and follow-up     Seen by therapist 12/1/23, 12/15, 1/12, 1/26, 2/9 & 3/8/23. Seen by pain clinic 3/6/24 for cervicalgia, RUE myofascial pain. Xray done, started on gabapentin and referred to PT.     Seen 3/8/24 for a physical and follow up since seen in nov. Noted had been evaluated by PMDR recently for pain in right arm. Hx of prior left shoulder pain and prior ac separation and surgery. Ever since then if woke up occasionally with a crick in neck would see his chiro & it would go away. Most recent episode of crick in neck ws not helped by chiro & noted pain going down right arm to elbow.  Pain noted worse if sitting or standing typing at desk only. Able to fish with no symptoms. Seen by spine doctor early march . Xray done showed normal cervical spine alignment. Mild loss of disc height at C5-6. Scattered mild degenerative changes including facet arthropathy, uncinate hypertrophy, and anterior endplate osteophytosis. No prevertebral edema. No mass in the visualized lung apices. Was given gabapentin 300 mg told to titrate up to tid. Noted only taking at bedtime as made him feel loopy but would take  one at lunchtime if having a painful morning at work and if at home to get through the afternoon. Encouraged to start PT ordered & return to PMDR after that as planned.  Asthma   mild persistent well controlled, ACT =24 , on Advair 250/50 prescribed 1 puff twice a day, but using 2 puffs once a day, reported no gaps in use though med not filled in epic in a while. Did note girlfriend also had Advair so may be sharing. No side effects. Not used albuterol in a while. Had held off on lung function test and pulmonary referral. Noted continued to vape marijuana. Encouraged to avoid vaping and change Advair to 1 puff twice a day and may use controlled inhaler as well for rescue no more than 12 total puffs in 24 hrs. Med refilled & Asthma action plan placed in [facility]  Hypertension on no meds. blood pressure 148/77. Not on any stimulants.  Encouraged a low-salt low caffeine low alcohol in diet, increase regular physical activity and work to lose 10% of total body weight.  Advised to avoid NSAID's as much as possible. FH of HTN. Alcohol 3 to 4 / week. Agreeable to meds. Discussed options. Started lisinopril 10 mg  daily, to change posture slowly till got used to lower BP. Stay well hydrated, monitor for a dry hacky cough. Was to do a nurse apt with bmp in 3 weeks for BP check & see me end of may for BP   BMI 35 with HTN & dyslipidemia, no known snoring. To continue to work on diet, exercise low carb intake smaller portions,  more plant-based and some weight loss. Felt set back to exercise due to neck and arm. Agreeable to referral to wt specialist. Labs later showed  normal glucose, HBA1c & TSH.   Dyslipidemia: elevated triglycerides and low HDL, & normal LFT's. Encouraged exercising 150 minutes of aerobic exercise per week (30 minutes 5 days per week or 50 minutes 3 days per week are options), weight loss would help.   Chronic idiopathic gout and hyperuricemia asymptomatic on allopurinol 200 mg daily. Had not taken Indocin > 1 yr & avoiding other NSAID's as much as possible as it could raise blood pressure. Encouraged to continue to avoid red meat as much as possible and limit alcohol intake. Noted  med not filled in a while but he reported no gaps in med. Later uric acid came back elevated and allopurinol was increased to 300 mg daily, to recheck uric acid at follow up  Elevated calcium noted in April 2023 and again in June 2023 though ionized calcium at that time was normal. Recheck calcium along with ionized calcium, vitamin D and parathyroid in nov 2023 had been normal. Calcium came back elevated again but prior work up did not show a concerning cause, to recheck this at next apt .Had a normal CBC.  Noted history of trouble concentrating in the past, reported no depression or anxiety.  Referred for diagnostic eval and after several months did get an evaluation at [facility] in 2022, records were not available but reported was told had a neurocognitive processing disorder related to processing speed. Had had no trouble taking tests in the past.  Instead procrastination was the bigger issue. Referred to a community psychiatry to further evaluate and treat. No apt with psyche made. Did start therapy for skills in 8/2023. Had been doing therapy for the ADHD like symptoms & felt it had  been going well. He got rid of external distractions and was still finding tough time concentrating.  Mind would go to daydreaming when trying to do code.  Was advised by therapist to talk about meds and he was encouraged to call [facility] to make an appointment with psychiatrist there to discuss medications  Reported playing phone tag with them.  Multiple moles and family history of skin cancer encouraged to schedule with dermatology. Had apt in aug   In nov 2023 felt the new patch noted in left groin area was most suggestive of a fungal infection like tinea corporis. Advised athlete's foot ointment over-the-counter twice a day to area up to 1 month after resolved and follow-up with Derm on this. He only applied it for a week, saw no change and stopped. No itching. Advised needed to give med longer time to work and was to retry.   SOFIA  reviewed. No ACP on file and declined infor,   Vaccines reviewed & UTD.   In nov given a Hepatitis booster as not immune despite prior vaccination and to check titers to see if  had adequate immunity. Later labs noted negative for Hep B but immune to hep B.      Seen by PT 3/22/24.  Seen by sports med 4/3/24 &MRI C spine ordered for RUE pain and hx of cervical spondylosis without myelopathy. Seen by Counsellor on 4/5, 4/19, & 5/3. Seen by sports med 5/3, noted continued pain dn not done mri and encouraged to do so. Did not return for nurse BP check or BMP since starting lisinopril. No showed to counselor on 5/31. Did labs on 6/21. Calcium remained mildly elevated with normal vit d and low normal pth & normal ionized calcium. Rest of lytes wnl. Offered considering an e consult to endo. Uric acid improved but still elevated. Was advised to continue lisinopril but if BP not goal would have to adjust. Same with allopurinol and if symptomatic gout. MRI c spine done 7/2024 showed a disc protrusion at C5/6 causing mild narrowing around the nerves at that segment of spine. Was recommend to schedule a follow up visit with Dr Nieto to review the results and discuss treatment options. They were unable to get a hold of him and my chart message sent. Seen by PT 7/12/24.    Seen 8/19/24, Asthma mild persistent well controlled, ACT =24, Asthma action plan in place, on Advair 250/50 prescribed 1 puff twice a day, reports no gaps in use. No side effects. Has held off on lung function test and pulmonary referral. Continued to vape marijuana & encouraged to quit. Reported recently recovered from a cold 3 weeks prior when was positive for Covid. Used albuterol when sick otherwise just once a month or less. Desired an extra albuterol inhaler to keep in his backpack. To get the new Covid vaccine & flu shot in the fall.   Hypertension improved on lisinopril 10 mg, started in march 2024. Irregular follow up. Stable kidney function in  June 2024 on this med. Encouraged a low-salt , low caffeine, low alcohol in diet, increased regular physical activity and work to lose 10% of total body weight.  Advised to avoid NSAID's as much as possible. Had a FH of HTN.   BMI with comorbid HTN & dyslipidemia, was 35 then down to 32. Wt down 10 lbs. Walked 5 miles 3/ wk in summer and also [place] trips. & In winter active with fly fishing. Discussed alcohol use. Reported no known snoring. To continue to work on diet, exercise, low carb intake, smaller portions, more plant-based and some weight loss.   Dyslipidemia with hx of elevated triglycerides and low HDL, & normal LFT's. Encouraged to continue regular exercising 150 minutes of aerobic exercise per week & weight loss would help.   Chronic idiopathic gout and hyperuricemia asymptomatic on allopurinol 300 mg daily, (increased in march 2024) then in June 2024 uric acid improved but still not goal at 7.5. No symptoms. Discussed options, desired to reduce red meat and recheck in 6 months as had no symptoms. Noted drank Alcohol 2 nights a week, a glass of whisky or a couple beers, encouraged also reducing alcohol intake.   Right upper extremity pain & hx of cervical spondylosis. Pain noted worse if sitting or standing typing at desk only.  Hx of prior left shoulder pain and prior ac separation and surgery in the past. Previously would go in to see chiro as needed but symptoms did not get better with chiro manipulation and then seen in nov 2023 by PMDR & then by spine in early march 2024. Xray done showed normal cervical spine alignment. Mild loss of disc height at C5-6. Scattered mild degenerative changes including facet arthropathy, uncinate hypertrophy, and anterior endplate osteophytosis. No prevertebral edema. No mass in the visualized lung apices. Was given gabapentin 300 mg told to titrate up to tid. Mostly was just taking bedtime as made him feel loopy. Returned to [facility] in may 2024 and MRI ordered &  MRI done in 7/2024 showed mild cervical degenerative changes at C5-6 where there was mild spinal canal narrowing and mild bilateral neural foraminal narrowing but no myelopathic cord signal. Had been doing PT and exercises and stretching daily and felt better. Was supposed to return to [facility] to discuss MRI results.   Elevated serum calcium noted in April 2023 and again in June 2023 though ionized calcium at that time was normal.  Recheck calcium along with ionized calcium, vitamin D and parathyroid in nov 2023 had been normal. In march 2024  Calcium again came back elevated & recheck in June showed mildly elevated calcium of 10.1,with normal vit d of 24, low pth of 10 and a normal ionized calcium. Asymptomatic. Had offered an e consult to endo but did not hear back. Was not on any supplements. Just desired to monitor.   Noted history of trouble concentrating in the past, reported no depression or anxiety. Referred for diagnostic eval and after several months, did get an evaluation at [facility] in 2022, records were not available but reported was told had a neurocognitive processing disorder, related to processing speed. Had had no trouble taking tests in the past.  Instead procrastination was the bigger issue. Referred to a community psychiatry to further evaluate and treat. No apt with psyche made. Did start therapy for skills in 8/2023. Had been doing therapy for the ADHD like symptoms & felt it went well. 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  Was advised by his therapist to talk about meds and was encouraged at last two visits to call [facility] where had diagnostic assessment about setting up an appointment with a psychiatrist there to discuss medications if indicated. He continued with skills based CBT April through May 2024, then was no show and not seen since. In 8/2024 noted was no longer desiring to  see psyche for eval of an ADHD dx. He felt the therapy had been helpful but couldn't get to it in the summer when busy & planned to pick it back up in the fall.   Multiple moles and family history of skin cancer, was encouraged to schedule with dermatology. Initially was in aug then rescheduled to sept and since to be out of town and rescheduled again to April 2025. Advised could see if could get in with derm consultant earlier if needed. No longer had the patch in left groin area, previously treated with an antifungal cream. Was to return for a physical late feb 2025    Seen by the pain clinic 9/30/2024 for cervical spondylosis without myelopathy noted prior cervical disc herniation and resolved C6 radiculopathy.  Was advised to continue home exercises which were helping and even though reported had not been using gabapentin for a while and had no pain, gabapentin was renewed at 3 times a day given 270 with refills.  Seen by dermatology 2/6/2024 noted mild scalp folliculitis and given clinda shampoo to use 3 times a week.  Also noted keratosis pilaris and reassured on benign skin findings to follow-up in 1 year for skin check given family history.  [place]  shows received gabapentin 300 mg #270 on 3/6/2024, 4/14/2024 and 2/13/2025.(Filled on 2/13/25 he had reported not taking and no symptoms)    CURRENTLY   Here for physical & follow up  HM reviewed  Encouraged working on healthcare directives and honoring choices given to review.  Reports no hernia and declines a  exam encourage self testicular check regularly.  Vaccines reviewed and reported had flu and COVID-vaccine in November no records seen on [facility], updated immunization manually with reported arbitrary date.  Will do lab work today and make further recommendations and refill meds once labs reviewed.    Asthma  mild to moderate persistent well controlled, ACT =24, Asthma action plan place on Advair 250/50 prescribed 1 puff twice a day, reports no gaps  in use. No side effects. Has held off on lung function test and pulmonary referral. Continues to vape marijuana & encouraged to quit. Uses albuterol when sick otherwise just once a month or less.  Has tried Advair for rescue and notes it works within 30 minutes as opposed to albuterol which works within 30 seconds but it still useful.  Has not needed to use the extra inhaler much except only with intense activity.  Recently has been playing a lot of basketball which can be intense it feels great but has only required the extra Advair once.  May use albuterol prior to intense physical activity.  Currently has 100 puffs left on the albuterol inhaler and no refill needed yet.       Hypertension on lisinopril 10 mg started in march 2024. Stable kidney function in June 2024 on this med.  There is family history of hypertension.  Blood pressure today 144/71 recheck 124/77.  Has not been checking at home.  Encouraged home blood pressure checks and if consistently over 130 x 80 may benefit from increasing dose of lisinopril.  [place] decision made to continue 10 mg for now.  Encouraged a low-salt , low caffeine low alcohol in diet, increase regular physical activity and work to lose 10% of total body weight.  Advised to avoid NSAID's as much as possible.  Refill sent in and will adjust dose if lab work indicates.     BMI with comorbid HTN & dyslipidemia, now back to 35.  His height was marked a little less today.  Notes has been active at the gym playing basketball.  Is not as active like in the summertime.  Encouraged to lower carbohydrate intake, smaller portions, limit alcohol to 1 drink in a 24 timeframe no more than 3 times a week and avoid vaping cannabis as much as possible as it increases appetite. Currently declines weight loss medications or referral to a weight clinic.     Dyslipidemia: hx of elevated triglycerides and low HDL, & normal LFT's.  Check lipids today and go from there.  Encouraged to continue regular  exercising 150 minutes of aerobic exercise per week & weight loss will help.      Chronic idiopathic gout and hyperuricemia, asymptomatic on allopurinol 300 mg daily, (increased in march 2024) then in June 2024 uric acid improved but still not goal at 7.5.  When seen in August 2024 reported no symptoms, discussed options and decided to stop red meat and recheck in 6 months.  Continues to drink alcohol about 2 nights a week, a glass of whiskey or couple beers.  Encouraged cutting back on alcohol and sugary foods.  Will check uric acid level today and refill med after that reviewed in case dose needs adjusting.     Right upper extremity pain & hx of cervical spondylosis. Pain noted worse if sitting or standing typing at desk only.  Hx of prior left shoulder pain and prior ac separation and surgery in the past. Previously would go in to see chiro as needed but symptoms did not get better with chiro manipulation and then seen in nov 2023 by PMDR & then by spine in early march 2024. Xray done showed normal cervical spine alignment. Mild loss of disc height at C5-6. Scattered mild degenerative changes including facet arthropathy, uncinate hypertrophy, and anterior endplate osteophytosis. No prevertebral edema. No mass in the visualized lung apices. Was given gabapentin 300 mg told to titrate up to tid.he took it for a while just at bedtime as it made him feel loopy. He returned to [facility] in may 2024 and MRI ordered & MRI done in 7/2024 showed mild cervical degenerative changes at C5-6 where there was mild spinal canal narrowing and mild bilateral neural foraminal narrowing but no myelopathic cord signal. He did PT and exercises and stretching daily and it got better. Seen by the pain clinic 9/30/2024 for cervical spondylosis without myelopathy noted prior cervical disc herniation and resolved C6 radiculopathy.  Was advised to continue home exercises which were helping and even though reported had not been using gabapentin  for a while and had no pain, gabapentin was renewed at 3 times a day given 270 with refills.  Reports no pain and reports has not taken the gabapentin at all in a while.  [place]  shows received gabapentin 300 mg #270 on 3/6/2024, 4/14/2024 and 2/13/2025.  If not taking in the future will remove this off the med list so does not get automatically refilled.     Elevated serum calcium noted in April 2023 and again in June 2023 though ionized calcium at that time was normal.  Recheck calcium along with ionized calcium, vitamin D and parathyroid in nov 2023 had been normal. In march 2024  Calcium again came back elevated & recheck in June showed mildly elevated calcium of 10.1,with normal vit d of 24, low pth of 10 and a normal ionized calcium. Asymptomatic. Had offered an e consult to santy but did not hear back. Is not on any supplements.  Has been asymptomatic and opted just to monitor. So will recheck labs today and go from there.    Noted history of trouble concentrating in the past, reported no depression or anxiety. Referred for diagnostic eval and after several months, did get an evaluation at [facility] in 2022, records were not available but reported was told had a neurocognitive processing disorder, related to processing speed. Had had no trouble taking tests in the past.  Instead procrastination was the bigger issue. Referred to a community psychiatry to further evaluate and treat. No apt with psyche made. Did start therapy for skills in 8/2023. Had been doing therapy for the ADHD like symptoms & felt it went well. 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  Was advised by his therapist to talk about meds and was encouraged at last two visits to call [facility] where had diagnostic assessment about setting up an appointment with a psychiatrist there to discuss medications if indicated. He continued  "with skills based CBT April through May 2024, then was no show and not seen since. In 8/2024 noted was no longer desiring to see psyche for eval of an ADHD dx. He felt the therapy had been helpful but couldn't get to it in the summer when busy & planned to pick it back up in the fall.  He ended up not returning to them. 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     Multiple moles and family history of skin cancer, No longer with the patch in left groin area, previously treated with an antifungal cream. Seen by dermatology 2/6/2024 & noted mild scalp folliculitis and given clinda shampoo to use 3 times a week.  Also noted keratosis pilaris and reassured on benign skin findings & to follow-up with derm in 1 year for skin check given family history.     Currently no fever or chills, no headache or dizziness, no double or blurry vision, no facial pain, earache, sore throat, runny nose, post nasal drip, no trouble hearing, smelling, tasting or swallowing, no cough , no chest pain, trouble breathing or palpitations, No abdominal pain, heart burn, reflux, nausea or vomiting or diarrhea or constipation, no blood in stools or black stools, no weight loss or night sweats. No dysuria, hematuria, frequency, urgency, hesitancy, incontinence, No pelvic complaints. No leg swelling or joint pain. No rash.    Review of Systems  Constitutional, HEENT, cardiovascular, pulmonary, GI, , musculoskeletal, neuro, skin, endocrine and psych systems are negative, except as otherwise noted.     Objective    Exam  /77   Pulse 57   Temp 97.3  F (36.3  C) (Temporal)   Resp 16   Ht 1.887 m (6' 2.29\")   Wt 126.6 kg (279 lb)   SpO2 99%   BMI 35.54 kg/m     Estimated body mass index is 35.54 kg/m  as calculated from the following:    Height as of this encounter: 1.887 m " "(6' 2.29\").    Weight as of this encounter: 126.6 kg (279 lb).    Physical Exam  GENERAL: alert and no distress  EYES: Eyes grossly normal to inspection, PERRL and conjunctivae and sclerae normal, glasses  HENT: ear canals and TM's normal, nose and mouth without ulcers or lesions  NECK: no adenopathy, no asymmetry, masses, or scars  RESP: lungs clear to auscultation - no rales, rhonchi or wheezes  CV: regular rate and rhythm, normal S1 S2, no S3 or S4, no murmur, click or rub, no peripheral edema  ABDOMEN: soft, non tender, no hepatosplenomegaly, no masses and bowel sounds normal  MS: no gross musculoskeletal defects noted, no edema  SKIN: no suspicious lesions or rashes, multiple pigmented nevi  NEURO: Normal strength and tone, mentation intact and speech normal  PSYCH: mentation appears normal, affect normal/bright    Signed Electronically by: Mariana Sherman MD  "

## 2025-02-24 NOTE — LETTER
My Asthma Action Plan    Name: David Martin   YOB: 1995  Date: 2/24/2025   My doctor: Mariana Sherman MD   My clinic: [facility]        My Control Medicine: Fluticasone propionate (Flovent Diskus) - 250 mcg 1 puff twice a day and as needed  My Rescue Medicine: Albuterol (Proair/Ventolin/Proventil HFA) 2-4 puffs EVERY 4 HOURS as needed. Use a spacer if recommended by your provider.   My Asthma Severity:   Mild Persistent  Know your asthma triggers: exercise or sports  None            GREEN ZONE   Good Control  I feel good  No cough or wheeze  Can work, sleep and play without asthma symptoms       Take your asthma control medicine every day.     If exercise triggers your asthma, take your rescue medication  15 minutes before exercise or sports, and  During exercise if you have asthma symptoms  Spacer to use with inhaler: If you have a spacer, make sure to use it with your inhaler             YELLOW ZONE Getting Worse  I have ANY of these:  I do not feel good  Cough or wheeze  Chest feels tight  Wake up at night   Keep taking your Green Zone medications  Start taking your rescue medicine:  every 20 minutes for up to 1 hour. Then every 4 hours for 24-48 hours.  If you stay in the Yellow Zone for more than 12-24 hours, contact your doctor.  If you do not return to the Green Zone in 12-24 hours or you get worse, start taking your oral steroid medicine if prescribed by your provider.           RED ZONE Medical Alert - Get Help  I have ANY of these:  I feel awful  Medicine is not helping  Breathing getting harder  Trouble walking or talking  Nose opens wide to breathe       Take your rescue medicine NOW  If your provider has prescribed an oral steroid medicine, start taking it NOW  Call your doctor NOW  If you are still in the Red Zone after 20 minutes and you have not reached your doctor:  Take your rescue medicine again and  Call 911 or go to the emergency room right away    See  your regular doctor within 2 weeks of an Emergency Room or Urgent Care visit for follow-up treatment.          Annual Reminders:  Meet with Asthma Educator,  Flu Shot in the Fall, consider Pneumonia Vaccination for patients with asthma (aged 19 and older).    Pharmacy:    [id number] - [place], [place] - [address] AT [facility] & [address]  [id number] - [place], [place] - [address] AT [facility] [address] AND [address]    Electronically signed by Mariana Sherman MD   Date: 02/24/25                      Asthma Triggers  How To Control Things That Make Your Asthma Worse    Triggers are things that make your asthma worse.  Look at the list below to help you find your triggers and what you can do about them.  You can help prevent asthma flare-ups by staying away from your triggers.      Trigger                                                          What you can do   Cigarette Smoke  Tobacco smoke can make asthma worse. Do not allow smoking in your home, car or around you.  Be sure no one smokes at a child s day care or school.  If you smoke, ask your health care provider for ways to help you quit.  Ask family members to quit too.  Ask your health care provider for a referral to Quit Plan to help you quit smoking, or call 0-142-101-PLAN.     Colds, Flu, Bronchitis  These are common triggers of asthma. Wash your hands often.  Don t touch your eyes, nose or mouth.  Get a flu shot every year.     Dust Mites  These are tiny bugs that live in cloth or carpet. They are too small to see. Wash sheets and blankets in hot water every week.   Encase pillows and mattress in dust mite proof covers.  Avoid having carpet if you can. If you have carpet, vacuum weekly.   Use a dust mask and HEPA vacuum.   Pollen and Outdoor Mold  Some people are allergic to trees, grass, or weed pollen, or molds. Try to keep your windows closed.  Limit time out doors when pollen count is high.   Ask  you health care provider about taking medicine during allergy season.     Animal Dander  Some people are allergic to skin flakes, urine or saliva from pets with fur or feathers. Keep pets with fur or feathers out of your home.    If you can t keep the pet outdoors, then keep the pet out of your bedroom.  Keep the bedroom door closed.  Keep pets off cloth furniture and away from stuffed toys.     Mice, Rats, and Cockroaches   Some people are allergic to the waste from these pests.   Cover food and garbage.  Clean up spills and food crumbs.  Store grease in the refrigerator.   Keep food out of the bedroom.   Indoor Mold  This can be a trigger if your home has high moisture. Fix leaking faucets, pipes, or other sources of water.   Clean moldy surfaces.  Dehumidify basement if it is damp and smelly.   Smoke, Strong Odors, and Sprays  These can reduce air quality. Stay away from strong odors and sprays, such as perfume, powder, hair spray, paints, smoke incense, paint, cleaning products, candles and new carpet.   Exercise or Sports  Some people with asthma have this trigger. Be active!  Ask your doctor about taking medicine before sports or exercise to prevent symptoms.    Warm up for 5-10 minutes before and after sports or exercise.     Other Triggers of Asthma  Cold air:  Cover your nose and mouth with a scarf.  Sometimes laughing or crying can be a trigger.  Some medicines and food can trigger asthma.

## 2025-02-24 NOTE — PATIENT INSTRUCTIONS
Patient Education   Preventive Care Advice   This is general advice given by our system to help you stay healthy. However, your care team may have specific advice just for you. Please talk to your care team about your preventive care needs.  Nutrition  Eat 5 or more servings of fruits and vegetables each day.  Try wheat bread, brown rice and whole grain pasta (instead of white bread, rice, and pasta).  Get enough calcium and vitamin D. Check the label on foods and aim for 100% of the RDA (recommended daily allowance).  Lifestyle  Exercise at least 150 minutes each week  (30 minutes a day, 5 days a week).  Do muscle strengthening activities 2 days a week. These help control your weight and prevent disease.  No smoking.  Wear sunscreen to prevent skin cancer.  Have a dental exam and cleaning every 6 months.  Yearly exams  See your health care team every year to talk about:  Any changes in your health.  Any medicines your care team has prescribed.  Preventive care, family planning, and ways to prevent chronic diseases.  Shots (vaccines)   HPV shots (up to age 26), if you've never had them before.  Hepatitis B shots (up to age 59), if you've never had them before.  COVID-19 shot: Get this shot when it's due.  Flu shot: Get a flu shot every year.  Tetanus shot: Get a tetanus shot every 10 years.  Pneumococcal, hepatitis A, and RSV shots: Ask your care team if you need these based on your risk.  Shingles shot (for age 50 and up)  General health tests  Diabetes screening:  Starting at age 35, Get screened for diabetes at least every 3 years.  If you are younger than age 35, ask your care team if you should be screened for diabetes.  Cholesterol test: At age 39, start having a cholesterol test every 5 years, or more often if advised.  Bone density scan (DEXA): At age 50, ask your care team if you should have this scan for osteoporosis (brittle bones).  Hepatitis C: Get tested at least once in your life.  STIs (sexually  transmitted infections)  Before age 24: Ask your care team if you should be screened for STIs.  After age 24: Get screened for STIs if you're at risk. You are at risk for STIs (including HIV) if:  You are sexually active with more than one person.  You don't use condoms every time.  You or a partner was diagnosed with a sexually transmitted infection.  If you are at risk for HIV, ask about PrEP medicine to prevent HIV.  Get tested for HIV at least once in your life, whether you are at risk for HIV or not.  Cancer screening tests  Cervical cancer screening: If you have a cervix, begin getting regular cervical cancer screening tests starting at age 21.  Breast cancer scan (mammogram): If you've ever had breasts, begin having regular mammograms starting at age 40. This is a scan to check for breast cancer.  Colon cancer screening: It is important to start screening for colon cancer at age 45.  Have a colonoscopy test every 10 years (or more often if you're at risk) Or, ask your provider about stool tests like a FIT test every year or Cologuard test every 3 years.  To learn more about your testing options, visit:   .  For help making a decision, visit:   https://bit.ly/te72869.  Prostate cancer screening test: If you have a prostate, ask your care team if a prostate cancer screening test (PSA) at age 55 is right for you.  Lung cancer screening: If you are a current or former smoker ages 50 to 80, ask your care team if ongoing lung cancer screenings are right for you.  For informational purposes only. Not to replace the advice of your health care provider. Copyright   2023 [facility]. All rights reserved. Clinically reviewed by the [facility]. [employer] [id number] - REV 01/24.  Substance Use Disorder: Care Instructions  Overview     You can improve your life and health by stopping your use of alcohol or drugs. When you don't drink or use drugs, you may feel and sleep better. You may  get along better with your family, friends, and coworkers. There are medicines and programs that can help with substance use disorder.  How can you care for yourself at home?  Here are some ways to help you stay sober and prevent relapse.  If you have been given medicine to help keep you sober or reduce your cravings, be sure to take it exactly as prescribed.  Talk to your doctor about programs that can help you stop using drugs or drinking alcohol.  Do not keep alcohol or drugs in your home.  Plan ahead. Think about what you'll say if other people ask you to drink or use drugs. Try not to spend time with people who drink or use drugs.  Use the time and money spent on drinking or drugs to do something that's important to you.  Preventing a relapse  Have a plan to deal with relapse. Learn to recognize changes in your thinking that lead you to drink or use drugs. Get help before you start to drink or use drugs again.  Try to stay away from situations, friends, or places that may lead you to drink or use drugs.  If you feel the need to drink alcohol or use drugs again, seek help right away. Call a trusted friend or family member. Some people get support from organizations such as Narcotics Anonymous or [employer] or from treatment facilities.  If you relapse, get help as soon as you can. Some people make a plan with another person that outlines what they want that person to do for them if they relapse. The plan usually includes how to handle the relapse and who to notify in case of relapse.  Don't give up. Remember that a relapse doesn't mean that you have failed. Use the experience to learn the triggers that lead you to drink or use drugs. Then quit again. Recovery is a lifelong process. Many people have several relapses before they are able to quit for good.  Follow-up care is a key part of your treatment and safety. Be sure to make and go to all appointments, and call your doctor if you are having problems. It's  "also a good idea to know your test results and keep a list of the medicines you take.  When should you call for help?   Call 911  anytime you think you may need emergency care. For example, call if you or someone else:    Has overdosed or has withdrawal signs. Be sure to tell the emergency workers that you are or someone else is using or trying to quit using drugs. Overdose or withdrawal signs may include:  Losing consciousness.  Seizure.  Seeing or hearing things that aren't there (hallucinations).     Is thinking or talking about suicide or harming others.   Where to get help 24 hours a day, 7 days a week   If you or someone you know talks about suicide, self-harm, a mental health crisis, a substance use crisis, or any other kind of emotional distress, get help right away. You can:    Call the Suicide and Crisis Lifeline at 988.     Call 5-605-915-TALK (1-639.777.8277).     Text HOME to 223809 to access the Crisis Text Line.   Consider saving these numbers in your phone.  Go to [employer] for more information or to chat online.  Call your doctor now or seek immediate medical care if:    You are having withdrawal symptoms. These may include nausea or vomiting, sweating, shakiness, and anxiety.   Watch closely for changes in your health, and be sure to contact your doctor if:    You have a relapse.     You need more help or support to stop.   Where can you learn more?  Go to https://www.[employer]net/patiented  Enter H573 in the search box to learn more about \"Substance Use Disorder: Care Instructions.\"  Current as of: November 15, 2023  Content Version: 14.3    2024 [employer].   Care instructions adapted under license by your healthcare professional. If you have questions about a medical condition or this instruction, always ask your healthcare professional. [employer] disclaims any warranty or liability for your use of this information.       "

## 2025-02-25 LAB
ALBUMIN SERPL BCG-MCNC: 4.7 G/DL (ref 3.5–5.2)
ALP SERPL-CCNC: 51 U/L (ref 40–150)
ALT SERPL W P-5'-P-CCNC: 32 U/L (ref 0–70)
ANION GAP SERPL CALCULATED.3IONS-SCNC: 10 MMOL/L (ref 7–15)
AST SERPL W P-5'-P-CCNC: 33 U/L (ref 0–45)
BILIRUB SERPL-MCNC: 0.6 MG/DL
BUN SERPL-MCNC: 11.9 MG/DL (ref 6–20)
CALCIUM SERPL-MCNC: 9.8 MG/DL (ref 8.8–10.4)
CHLORIDE SERPL-SCNC: 102 MMOL/L (ref 98–107)
CHOLEST SERPL-MCNC: 177 MG/DL
CREAT SERPL-MCNC: 0.86 MG/DL (ref 0.67–1.17)
CREAT UR-MCNC: 80.9 MG/DL
EGFRCR SERPLBLD CKD-EPI 2021: >90 ML/MIN/1.73M2
FASTING STATUS PATIENT QL REPORTED: NO
FASTING STATUS PATIENT QL REPORTED: NO
GLUCOSE SERPL-MCNC: 85 MG/DL (ref 70–99)
HCO3 SERPL-SCNC: 27 MMOL/L (ref 22–29)
HDLC SERPL-MCNC: 37 MG/DL
LDLC SERPL CALC-MCNC: 93 MG/DL
MICROALBUMIN UR-MCNC: <12 MG/L
MICROALBUMIN/CREAT UR: NORMAL MG/G{CREAT}
NONHDLC SERPL-MCNC: 140 MG/DL
POTASSIUM SERPL-SCNC: 4.3 MMOL/L (ref 3.4–5.3)
PROT SERPL-MCNC: 7 G/DL (ref 6.4–8.3)
PTH-INTACT SERPL-MCNC: 33 PG/ML (ref 15–65)
SODIUM SERPL-SCNC: 139 MMOL/L (ref 135–145)
TRIGL SERPL-MCNC: 235 MG/DL
TSH SERPL DL<=0.005 MIU/L-ACNC: 3.67 UIU/ML (ref 0.3–4.2)
URATE SERPL-MCNC: 7.1 MG/DL (ref 3.4–7)
VIT D+METAB SERPL-MCNC: 19 NG/ML (ref 20–50)

## 2025-02-25 RX ORDER — ALLOPURINOL 300 MG/1
300 TABLET ORAL DAILY
Qty: 90 TABLET | Refills: 1 | Status: SHIPPED | OUTPATIENT
Start: 2025-02-25

## 2025-02-25 NOTE — RESULT ENCOUNTER NOTE
Hello -    Here are my comments about your recent results:  Normal thyroid function  -Triglycerides are elevated which can increase your heart disease risk.  A diet high in fat and simple carbohydrates, genetics and being overweight can contribute to this.  Your LDL(bad) cholesterol and HDL(good) cholesterol levels are normal.  ADVISE: exercising 150 minutes of aerobic exercise per week (30 minutes 5 days per week or 50 minutes 3 days per week are options), weight control, to improve this.  -Liver and gallbladder tests are normal (ALT,AST, Alk phos, bilirubin), kidney function is normal (Cr, GFR), sodium is normal, potassium is normal, calcium is normal, glucose is normal.  -TSH (thyroid stimulating hormone) level is normal which indicates normal thyroid function.  Uric acid level is slightly improved to 7.1 almost close to normal since you are asymptomatic continue current dose of allopurinol recommend avoiding alcohol as well as red meat to improve uric acid levels and will recheck in 6 months  For additional lab test information, [email or web address] is an excellent reference..    Please let us know if you have any questions or concerns.     Regards,  Mariana Sherman MD

## 2025-02-25 NOTE — RESULT ENCOUNTER NOTE
Hello -    Here are my comments about your recent results:  Normal parathyroid hormone    Please let us know if you have any questions or concerns.     Regards,  Mariana Sherman MD

## 2025-02-25 NOTE — RESULT ENCOUNTER NOTE
Hello -    Here are my comments about your recent results:  Normal ionized calcium    Please let us know if you have any questions or concerns.     Regards,  Mariana Sherman MD

## 2025-02-26 NOTE — RESULT ENCOUNTER NOTE
Hello -    Here are my comments about your recent results:  -Microalbumin (urine protein) test is normal.  ADVISE: rechecking this annually.  For additional lab test information, [email or web address] is an excellent reference..    Please let us know if you have any questions or concerns.     Regards,  Mariana Sherman MD

## 2025-04-27 NOTE — PROGRESS NOTES
Assessment & Plan     Moderate persistent asthma without complication  Asthma better, continue Advair 250/50 1 puff twice a day, rinse well after using. Use albuterol only for rescue, use has decreased which is a good sign. Reminded to schedule lung function tests. Hold off on pulmonary referral for now. Avoid vaping. Refills for Advair sent in 6 months. Sent in one albuterol inhaler for rescue. Has a lot left on one sent in feb so should be ok a while. See back in 6 month.     BP high, recheck better. Encouraged low salt, low caffeine, low alcohol in diet and increase regular physical activity and work to loose 10 % of total body weight. No meds for now. Recheck in 6 months. Encouraged to get a home blood pressure machine.  To check blood pressure at home if consistently over 130 x 80 may benefit from medication.     Dyslipidemia, / BMI >34, weight down a bit, to continue to work on diet, exercise, low carb, smaller portion, more plant based mediterranean diet and weight loss. No meds needed yet. Recheck in jan at next physical     Gout better asymptomatic on allopurinol 100 mg daily taking more consistently. No gout attacks recently will check uric acid. Has enough med till end of June, will wait to see labs to refill med in case dose needs changing. Avoid indocin and other NSAID'S as much as possible as they can raise BP    Schedule with derm for fh of skin cancer and multiple moles. Sun protection    Follow up mental health as planned for trouble concentrating and procrastination in august     Will check for hep B immunity as await his mom to send us his childhood immunizations, if not immune consider revaccination against hep B    See back in 6 months and earlier as needed    Addendum labs after visit showed    -Kidney function (GFR) is normal.  -Sodium is normal.  -Potassium is normal.  -Calcium is elevated.  It could be from artifact. Meds should not be causing this. But need to rule out other causes  ADVISE: rechecking this in 1 month. I'll leave an order in the chart to do in a lab only apt he  can schedule. My chart message sent in  -Glucose is normal.  Uric acid remains elevated at 7.8. reported not having any gout attacks. Continue allopurinol 100 mg daily. If having attacks or remains more than 65 consider increasing dose to 200 mg daily. Let me know.  Testing for hepatitis B shows no infection but also not immune against Hep B. Recommend hep B vaccination series of three shots at 0, 2, & 4 month intervals. Can schedule with the nurse & can start same day as comes in for recheck lab if desired.   - General PFT Lab (Please always keep checked); Future  - Pulmonary Function Test; Future  - albuterol (PROAIR HFA/PROVENTIL HFA/VENTOLIN HFA) 108 (90 Base) MCG/ACT inhaler; Inhale 2 puffs into the lungs every 6 hours as needed for shortness of breath  - fluticasone-salmeterol (ADVAIR DISKUS) 250-50 MCG/ACT inhaler; Inhale 1 puff into the lungs every 12 hours  - PRIMARY CARE FOLLOW-UP SCHEDULING; Future    Hypertension, unspecified type  BP high, recheck better. Encouraged low salt, low caffeine, low alcohol in diet and increase regular physical activity and work to loose 10 % of total body weight. No meds for now. Recheck in 6 months. Encouraged to get a home blood pressure machine.  To check blood pressure at home if consistently over 130 x 80 may benefit from medication.   - Basic metabolic panel  (Ca, Cl, CO2, Creat, Gluc, K, Na, BUN); Future  - PRIMARY CARE FOLLOW-UP SCHEDULING; Future  - Basic metabolic panel  (Ca, Cl, CO2, Creat, Gluc, K, Na, BUN)    Dyslipidemia  BMI 34.0-34.9,adult  Dyslipidemia, / BMI >34, weight down a bit, to continue to work on diet, exercise, low carb, smaller portion, more plant based mediterranean diet and weight loss. No meds needed yet. Recheck in jan at next physical     Chronic idiopathic gout  Gout better asymptomatic on allopurinol 100 mg daily taking more consistently. No  gout attacks recently will check uric acid. Has enough med till end of June, will wait to see labs to refill med in case dose needs changing. Avoid indocin and other NSAID'S as much as possible as they can raise BP  - Uric acid; Future  - Uric acid    Multiple pigmented nevi  Family history of skin cancer  Reminded to schedule with derm for fh of skin cancer and multiple moles. Sun protection    Disturbed concentration  Follow up mental health as planned for trouble concentrating and procrastination in august     Need for hepatitis B vaccination  Immunity status testing  Will check for hep B immunity as await his mom to send us his childhood immunizations, if not immune consider revaccination against hep B  See back in 6 months and earlier as needed  - Hepatitis B surface antigen; Future  - Hepatitis B Surface Antibody; Future  - Hepatitis B surface antigen  - Hepatitis B Surface Antibody    Review of the result(s) of each unique test - diagnostics since jan 2023  Diagnosis or treatment significantly limited by social determinants of health - gaps in care,   Ordering of each unique test  Prescription drug management  48 minutes spent by me on the date of the encounter doing chart review, history and exam, documentation and further activities per the note     Work on weight loss  Regular exercise  See Patient Instructions    Mariana Sherman MD  [facility]    Eric Hernandez is a 28 year old, presenting for the following health issues:  Hypertension (Asthma control)        4/28/2023     9:23 AM   Additional Questions   Roomed by Juliane FERGUSON     History of Present Illness     Asthma:  He presents for follow up of asthma.  He has no cough, no wheezing, and no shortness of breath. He is using a relief medication a few times a month. He does not miss any doses of his controller medication throughout the week.Patient is aware of the following triggers: same as previous visit. The patient has not had  a visit to the Emergency Room, Urgent Care or Hospital due to asthma since the last clinic visit.     Hypertension: He presents for follow up of hypertension.  He does not check blood pressure  regularly outside of the clinic. Outside blood pressures have been over 140/90. He does not follow a low salt diet.     He eats 2-3 servings of fruits and vegetables daily.He consumes 1 sweetened beverage(s) daily.He exercises with enough effort to increase his heart rate 20 to 29 minutes per day.  He exercises with enough effort to increase his heart rate 4 days per week. He is missing 2 dose(s) of medications per week.  He is not taking prescribed medications regularly due to remembering to take.     CURRENTLY   Here for follow up of asthma and BP  Self testicular check regularly     Moderate persistent asthma improved from a year ago , seen 1/20/23 when noted still not goal. Continued on Advair 250/50 1 puff twice daily. Advised to limit use of albuterol as rescue only, noted had been filled 8 times in the last year which suggested high use & need for better asthma control. Initially filling freq due to not being on Advair then due to losing inhalers at least 3 different times but still a high amount of rescue inhaler use.  Encouraged to schedule lung function tests. In the meantime encouraged to avoid vaping and work on weight loss which would help. Asthma action plan verbally in place.  Prevnar 20 given.  Noted had 3 refills on Advair currently and albuterol recently refilled so he was to contact us when these were needed. Now ACT =22, reports being more consistent wth Advair. Using rescue inhaler once a week or less, no loss of inhalers since last seen. Albuterol 6.7 last filled by triage on 2/24 23 and notes still has lots of that left. . Not scheduled pfts yet. Since ATC better discussed holding off on breo and seeing pulmonary for now.     HTN: ?  white coat hypertension/anxiety.  Does have family history of  hypertension and genetic predisposition for this. Opted to hold off on medication at last visit.  EKG did  not show any effect of blood pressure on the heart. Has not been checking BP at home, was late to apt and rushed, BP up, recheck better, has been trying a low-salt diet, low alcohol intake, low caffeine intake, increase physical activity, to avoid anti-inflammatories as much as possible, struggling to cutting back on caffeine.   Trying to exercise     Dyslipidemia, no meds to work on diet and exercise    Chronic idiopathic gout and family history of gout currently asymptomatic without any flares on allopurinol 100 mg daily.  Uric acid was elevated at last visit in jan. Noted had not been using consistently , now reports been more  Consistently taking allopurinol 10 mg daily and not had to use indomethacin since lats seen and more cognizant of less NSAID use given hx of HTN. Has enough med till June. Will do uric acid. Not popping on NSAID like before    BMI down from > 35 to 34,  weight down ten pounds but also new scale new clinic since last checked,  reported no significant snoring.  Sleeps on side, to work on weight loss, low-carb low-fat diet.  Is trying to increase physical activity    Multiple pigmented moles and family history of skin cancer refer to dermatology for skin check. Not scheduled with dermatology yet.    History of trouble concentrating in the past reports no depression or anxiety.  Referred for diagnostic eval and after several months did get an evaluation at [facility], records not available but reports was told had a neurocognitive processing disorder related to processing speed.  Has had no trouble taking tests in the past.  Instead procrastination was the bigger issue. Referred to community psychiatry to further evaluate and treat. 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  [place]  negative.  Moved to [place] in 2018, no prior records available, no regular care prior to that, Seen in urgent care 2019 for toe pain due to history of gout treated with prednisone, tested positive for COVID in 2020 and 2021.  Seen once in [place] on 2021 in urgent care for left toe pain and prior history of gout and given prednisone for 5 days.    Seen first time 22 to establish care and for preventive health and additional concerns. Advised  Self testicular check regularly. Labs done. Health care maintenance reviewed. Discussed working on healthcare directives.  Was given the Flu & pneumonia vaccines. He was to clarify when he last had Tdap & noted Covid vaccine x3 up-to-date.  Asthma was uncontrolled as had run out of his controller meds 6 months prior, noting shortness of breath with activity limiting exercise, resumed generic Wixela  250/50, 1 puff twice a day & if not better can do lung function tests and refer to pulmonary.   Noted Elevated blood pressure could be white coat hypertension & or  due to weight.  Recommended a low-salt diet, limiting alcohol to 1 drink a day no more than 7 total a week, 10% of total body weight loss & increasing aerobic activity, encouraged to katrina a home blood pressure machine and checking it at different times different days and calling us if it was consistently more than 130 x 80.  If persistently  elevated despite lifestyle changes then to consider medications. Discussed BMI, diet, exercise weight loss, avoiding marijuana as much as possible as this could increase weight gain over time  Reviewed his history of gout diagnosed clinically based on toe pain in the past and family history of gout in dad.  Recent joint pain had resolved.  Had made some recent lifestyle & dietary changes. Would avoid as needed prednisone as much as possible given long-term side effect (can worsen weight gain increases risk of diabetes, ulcers), may use indomethacin he has at home as needed.  Always try to use the lowest dose for the shortest duration of time as Indocin like other NSAID's increased risk of GI bleeding and heart attack.  Other options could be colchicine prn.  Side effects would be nausea vomiting diarrhea etc.  We can also consider referral to rheumatologist if necessary.  History of trouble focusing now affecting job: referred to a psychologist for diagnostic evaluation of ADHD.  Encouraged to avoid all marijuana prior to evaluation so they can get an unbiased evaluation.  Based on results could make recommendations for either skill base treatment and/or referral to a community psychiatrist for medication management.  Some medications like stimulants could make blood pressure worse so this might be tricky if his blood pressure remained elevated. Was to return in 1 month for blood pressure check, asthma follow-up etc.   Lab showed a normal cbc, TSH, HBA1c, Micro albumin, lipids other than elevated TG and uric acd was elevated at 8.1 & allopurinol started.  Seen  3/3/2022  & noted Asthma was better. ACT up from 13 to 17. Had only restarted generic Advair 1.5 weeks prior and expected to further improve with time. Continued on generic Advair 1 puff twice a day ( turned out with his new insurance was cheaper than wixela previously given). Had been on Advair before with good control in the past. Encouraged not to use  albuterol first thing in am but use the Advair instead then see if albuterol needed. Goal was to get to point where albuterol only used as rescue and one inhaler last 1 year preferably.  Encouraged to use albuterol prior to exercise if indicated. Lung function tests ordered. Felt no need for pulmonary referral yet. Encouraged to avoid vaping/ inhaling cannabis to protect lungs  BP was better, & advised to continue with low salt, low alcohol diet, there was no indication to check an ultrasound or do labs to search for secondary causes of elevated BP at the time. BMI had improved, was to continue to work on diet, exercise and loosing 10 % of total body weight over the year and rechecking TG at next physical.  Gout noted stable on allopurinol 100 mg, uric acid level checked to see if dose needed adjustment, was to continue with a hypouricemic diet ( low red meat, low alcohol). Use indomethacin sparingly as could raise BP( not required in a while) & felt no indication currently to see a rheumatologist   Noted his diagnostic eval for decreased concentration was scheduled in May outside [place]. Tdap was  given & advised to check in virtually in 4 weeks or so for asthma check in.  BMP came back normal and uric acid was down from 8.1 to 7.6 and continued on allopurinol 100 mg daily.  Seen virtually 4/8/22 noted asthma was doing better. Continued on Advair twice a day & refill sent in 1 yr, to Use albuterol as rescue. Had not scheduled PFT's yet. Felt no indication to see a pulmonologist. Gout was stable on allopurinol 100 mg daily. Uric acid improved from original. Goal was eventually keep it less than 6.5 to prevent attacks. Was to continue with a low uric acid level in diet ( diet / lifestyle measures). Was to get a diagnostic assessment for ADHD eval in may. Advised to return in 5 months for asthma follow up.   No follow up made. Treated for Covid in sept with paxlovid. Noted albuterol inhaler refilled 1/24/22, 2/3/22,  5/27/22, 6/29/22,8/11/22, 9/2/22, 10/22/22 & 1/19/23 and advised follow up.     Seen 1/20/23 for preventive health. Healthcare maintenance reviewed. Did not have healthcare directives and honoring choices given to review. Flu shot up-to-date. COVID-vaccine primary and booster series up-to-date. Prevnar given. Recommended hepatitis B vaccination felt may have had it when younger, was to check records with mom from [place] where grew up and if unable to find to do immune testing in the future and go from there. Moderate persistent asthma  improved from a year ago but still not at goal.  Continued on Advair 250/50 1 puff twice daily rinse mouth well after using inhaler. To limit use of albuterol as rescue only, had been filled 8 times in the last year which suggested high use & need for better asthma control. Initially filling freq was due to not being on Advair then due to losing inhalers at least 3 different times. To recheck asthma in 3 months.  Encouraged to schedule lung function tests  In the meantime encouraged to avoid vaping and work on weight loss which may help.  If asthma scores continued to be less than 20 to consider changing Advair to Breo, & seeing a pulmonologist to further evaluate and treat.  Asthma action plan verbally in place.  Prevnar 20 given.  Noted had 3 refills on Advair  and albuterol recently refilled so he was to contact us when these were needed. Chronic idiopathic gout and family history of gout  asymptomatic without any flares on allopurinol 100 mg daily.  Had not required indomethacin use in a long time.  Advised to continue to stay well-hydrated, drink alcohol in moderation, decrease red meat consumption and continue current dosing of allopurinol and will check uric acid levels and make further recommendations.  Refill sent in.   New dx of HTN made. Blood pressure elevated last year and again in jan  Recheck better but still borderline.  Thought due to white coat  hypertension/anxiety.  Did have family history of hypertension and genetic predisposition for this. Opted to hold off on medication for now.  EKG did not show any effect of blood pressure on the heart.  Recommended a low-salt diet, low alcohol intake, low caffeine intake, increase physical activity, at least 5 to 10% of total body weight loss to make a difference in blood pressure, avoid anti-inflammatories as much as possible, given blood pressure would be hesitant to be prescribing any stimulants but could discuss that more with a psychiatrist when saw them.  Prescription given for home blood pressure machine.  To check blood pressure at home if consistently over 130 x 80 may benefit from medication.  To return in 3 months for blood pressure check and if still elevated consider medication at that time. BMI more than 35, reported no significant snoring.  To sleep on side, work on weight loss, low-carb low-fat diet & Increase physical activity. Reviewed multiple pigmented moles and family history of skin cancer & referred to dermatology for skin check. Noted history of trouble concentrating in the past reported no depression or anxiety.  Referred for diagnostic eval and after several months did get an evaluation at [facility], records were not available but reported was told had a neurocognitive processing disorder related to processing speed.  Had had no trouble taking tests in the past.  Instead procrastination was the bigger issue. Referred to a community psychiatry to further evaluate and treat. Noted History of COVID treated with Paxlovid in September 2022 with no residual symptoms.   Micro albumin was normal, HDL low, triglycerides elevated, CMP otherwise unremarkable, TSH normal, uric acid 7.8, CBC and hemoglobin A1c normal.  On 2/24/2023 called about an albuterol refill.  Advised pulmonary referral and PFTs as recently filled in January. These were not scheduled.    Review of Systems   Constitutional, HEENT,  "cardiovascular, pulmonary, GI, , musculoskeletal, neuro, skin, endocrine and psych systems are negative, except as otherwise noted.      Objective    /72 (BP Location: Right arm, Patient Position: Right side, Cuff Size: Adult Large)   Pulse 81   Temp 98.3  F (36.8  C) (Oral)   Resp 18   Ht 1.88 m (6' 2\")   Wt 122 kg (269 lb)   SpO2 97%   BMI 34.54 kg/m    Body mass index is 34.54 kg/m .  Physical Exam   GENERAL: healthy, alert, no distress and obese  EYES: Eyes grossly normal to inspection, PERRL and conjunctivae and sclerae normal, glasses  HENT: ear canals and TM's normal, nose and mouth without ulcers or lesions  NECK: no adenopathy, no asymmetry, masses, or scars and thyroid normal to palpation  RESP: lungs clear to auscultation - no rales, rhonchi or wheezes  CV: regular rate and rhythm, normal S1 S2, no S3 or S4, no murmur, click or rub, no peripheral edema and peripheral pulses strong  ABDOMEN: soft, non tender, no hepatosplenomegaly, no masses and bowel sounds normal  MS: no gross musculoskeletal defects noted, no edema  SKIN: no suspicious lesions or rashes  NEURO: Normal strength and tone, mentation intact and speech normal  PSYCH: mentation appears normal, affect normal/bright    Results for orders placed or performed in visit on 04/28/23   Basic metabolic panel  (Ca, Cl, CO2, Creat, Gluc, K, Na, BUN)     Status: Abnormal   Result Value Ref Range    Sodium 140 136 - 145 mmol/L    Potassium 4.7 3.4 - 5.3 mmol/L    Chloride 102 98 - 107 mmol/L    Carbon Dioxide (CO2) 25 22 - 29 mmol/L    Anion Gap 13 7 - 15 mmol/L    Urea Nitrogen 13.8 6.0 - 20.0 mg/dL    Creatinine 0.91 0.67 - 1.17 mg/dL    Calcium 10.4 (H) 8.6 - 10.0 mg/dL    Glucose 97 70 - 99 mg/dL    GFR Estimate >90 >60 mL/min/1.73m2   Uric acid     Status: Abnormal   Result Value Ref Range    Uric Acid 7.8 (H) 3.4 - 7.0 mg/dL   Hepatitis B surface antigen     Status: Normal   Result Value Ref Range    Hepatitis B Surface Antigen " Nonreactive Nonreactive   Hepatitis B Surface Antibody     Status: None   Result Value Ref Range    Hepatitis B Surface Antibody Instrument Value 0.92 <8.00 m[IU]/mL    Hepatitis B Surface Antibody Nonreactive      Results for orders placed or performed in visit on 04/28/23 (from the past 24 hour(s))   Basic metabolic panel  (Ca, Cl, CO2, Creat, Gluc, K, Na, BUN)   Result Value Ref Range    Sodium 140 136 - 145 mmol/L    Potassium 4.7 3.4 - 5.3 mmol/L    Chloride 102 98 - 107 mmol/L    Carbon Dioxide (CO2) 25 22 - 29 mmol/L    Anion Gap 13 7 - 15 mmol/L    Urea Nitrogen 13.8 6.0 - 20.0 mg/dL    Creatinine 0.91 0.67 - 1.17 mg/dL    Calcium 10.4 (H) 8.6 - 10.0 mg/dL    Glucose 97 70 - 99 mg/dL    GFR Estimate >90 >60 mL/min/1.73m2   Uric acid   Result Value Ref Range    Uric Acid 7.8 (H) 3.4 - 7.0 mg/dL   Hepatitis B surface antigen   Result Value Ref Range    Hepatitis B Surface Antigen Nonreactive Nonreactive   Hepatitis B Surface Antibody   Result Value Ref Range    Hepatitis B Surface Antibody Instrument Value 0.92 <8.00 m[IU]/mL    Hepatitis B Surface Antibody Nonreactive                   DISPLAY PLAN FREE TEXT

## 2025-07-13 DIAGNOSIS — J45.40 MODERATE PERSISTENT ASTHMA WITHOUT COMPLICATION: ICD-10-CM

## 2025-07-13 RX ORDER — ALBUTEROL SULFATE 90 UG/1
AEROSOL, METERED RESPIRATORY (INHALATION)
Qty: 18 G | Refills: 0 | Status: SHIPPED | OUTPATIENT
Start: 2025-07-13

## 2025-08-06 ENCOUNTER — TELEPHONE (OUTPATIENT)
Dept: DERMATOLOGY | Facility: CLINIC | Age: 30
End: 2025-08-06
Payer: COMMERCIAL

## 2025-08-18 ENCOUNTER — OFFICE VISIT (OUTPATIENT)
Dept: FAMILY MEDICINE | Facility: CLINIC | Age: 30
End: 2025-08-18
Payer: COMMERCIAL

## 2025-08-18 VITALS
DIASTOLIC BLOOD PRESSURE: 88 MMHG | WEIGHT: 267 LBS | BODY MASS INDEX: 34.27 KG/M2 | SYSTOLIC BLOOD PRESSURE: 137 MMHG | RESPIRATION RATE: 18 BRPM | TEMPERATURE: 98.2 F | OXYGEN SATURATION: 99 % | HEIGHT: 74 IN | HEART RATE: 75 BPM

## 2025-08-18 DIAGNOSIS — E66.811 CLASS 1 OBESITY DUE TO EXCESS CALORIES WITH SERIOUS COMORBIDITY AND BODY MASS INDEX (BMI) OF 33.0 TO 33.9 IN ADULT: ICD-10-CM

## 2025-08-18 DIAGNOSIS — Z82.69 FAMILY HISTORY OF GOUT: ICD-10-CM

## 2025-08-18 DIAGNOSIS — M1A.00X0 CHRONIC IDIOPATHIC GOUT: ICD-10-CM

## 2025-08-18 DIAGNOSIS — R41.840 DISTURBED CONCENTRATION: ICD-10-CM

## 2025-08-18 DIAGNOSIS — J45.30 MILD PERSISTENT ASTHMA WITHOUT COMPLICATION: Primary | ICD-10-CM

## 2025-08-18 DIAGNOSIS — M47.812 CERVICAL SPONDYLOSIS WITHOUT MYELOPATHY: ICD-10-CM

## 2025-08-18 DIAGNOSIS — E79.0 HYPERURICEMIA: ICD-10-CM

## 2025-08-18 DIAGNOSIS — I10 HYPERTENSION, UNSPECIFIED TYPE: ICD-10-CM

## 2025-08-18 DIAGNOSIS — Z86.39 HISTORY OF VITAMIN D DEFICIENCY: ICD-10-CM

## 2025-08-18 DIAGNOSIS — E66.09 CLASS 1 OBESITY DUE TO EXCESS CALORIES WITH SERIOUS COMORBIDITY AND BODY MASS INDEX (BMI) OF 33.0 TO 33.9 IN ADULT: ICD-10-CM

## 2025-08-18 DIAGNOSIS — D22.9 MULTIPLE PIGMENTED NEVI: ICD-10-CM

## 2025-08-18 DIAGNOSIS — E78.5 DYSLIPIDEMIA: ICD-10-CM

## 2025-08-18 DIAGNOSIS — Z80.8 FAMILY HISTORY OF SKIN CANCER: ICD-10-CM

## 2025-08-18 PROBLEM — E83.52 SERUM CALCIUM ELEVATED: Status: RESOLVED | Noted: 2023-11-20 | Resolved: 2025-08-18

## 2025-08-18 LAB
ANION GAP SERPL CALCULATED.3IONS-SCNC: 11 MMOL/L (ref 7–15)
BUN SERPL-MCNC: 14.6 MG/DL (ref 6–20)
CALCIUM SERPL-MCNC: 10.1 MG/DL (ref 8.8–10.4)
CHLORIDE SERPL-SCNC: 102 MMOL/L (ref 98–107)
CREAT SERPL-MCNC: 0.87 MG/DL (ref 0.67–1.17)
EGFRCR SERPLBLD CKD-EPI 2021: >90 ML/MIN/1.73M2
GLUCOSE SERPL-MCNC: 90 MG/DL (ref 70–99)
HCO3 SERPL-SCNC: 26 MMOL/L (ref 22–29)
POTASSIUM SERPL-SCNC: 4.4 MMOL/L (ref 3.4–5.3)
SODIUM SERPL-SCNC: 139 MMOL/L (ref 135–145)
URATE SERPL-MCNC: 6.4 MG/DL (ref 3.4–7)
VIT D+METAB SERPL-MCNC: 34 NG/ML (ref 20–50)

## 2025-08-18 PROCEDURE — 3075F SYST BP GE 130 - 139MM HG: CPT | Performed by: FAMILY MEDICINE

## 2025-08-18 PROCEDURE — 3079F DIAST BP 80-89 MM HG: CPT | Performed by: FAMILY MEDICINE

## 2025-08-18 PROCEDURE — 80048 BASIC METABOLIC PNL TOTAL CA: CPT | Performed by: FAMILY MEDICINE

## 2025-08-18 PROCEDURE — 99000 SPECIMEN HANDLING OFFICE-LAB: CPT | Performed by: FAMILY MEDICINE

## 2025-08-18 PROCEDURE — 99214 OFFICE O/P EST MOD 30 MIN: CPT | Performed by: FAMILY MEDICINE

## 2025-08-18 PROCEDURE — 84550 ASSAY OF BLOOD/URIC ACID: CPT | Performed by: FAMILY MEDICINE

## 2025-08-18 PROCEDURE — G2211 COMPLEX E/M VISIT ADD ON: HCPCS | Performed by: FAMILY MEDICINE

## 2025-08-18 PROCEDURE — 36415 COLL VENOUS BLD VENIPUNCTURE: CPT | Performed by: FAMILY MEDICINE

## 2025-08-18 PROCEDURE — 84244 ASSAY OF RENIN: CPT | Mod: 90 | Performed by: FAMILY MEDICINE

## 2025-08-18 PROCEDURE — 82306 VITAMIN D 25 HYDROXY: CPT | Performed by: FAMILY MEDICINE

## 2025-08-18 RX ORDER — LISINOPRIL 10 MG/1
10 TABLET ORAL DAILY
Qty: 90 TABLET | Refills: 2 | Status: SHIPPED | OUTPATIENT
Start: 2025-08-18

## 2025-08-18 RX ORDER — ALLOPURINOL 300 MG/1
300 TABLET ORAL DAILY
Qty: 90 TABLET | Refills: 1 | Status: SHIPPED | OUTPATIENT
Start: 2025-08-18

## 2025-08-18 RX ORDER — FLUTICASONE PROPIONATE AND SALMETEROL 250; 50 UG/1; UG/1
1 POWDER RESPIRATORY (INHALATION) EVERY 12 HOURS
Qty: 3 EACH | Refills: 3 | Status: SHIPPED | OUTPATIENT
Start: 2025-08-18

## 2025-08-18 ASSESSMENT — ASTHMA QUESTIONNAIRES
ACT_TOTALSCORE: 24
QUESTION_5 LAST FOUR WEEKS HOW WOULD YOU RATE YOUR ASTHMA CONTROL: COMPLETELY CONTROLLED
QUESTION_3 LAST FOUR WEEKS HOW OFTEN DID YOUR ASTHMA SYMPTOMS (WHEEZING, COUGHING, SHORTNESS OF BREATH, CHEST TIGHTNESS OR PAIN) WAKE YOU UP AT NIGHT OR EARLIER THAN USUAL IN THE MORNING: NOT AT ALL
QUESTION_1 LAST FOUR WEEKS HOW MUCH OF THE TIME DID YOUR ASTHMA KEEP YOU FROM GETTING AS MUCH DONE AT WORK, SCHOOL OR AT HOME: NONE OF THE TIME
QUESTION_4 LAST FOUR WEEKS HOW OFTEN HAVE YOU USED YOUR RESCUE INHALER OR NEBULIZER MEDICATION (SUCH AS ALBUTEROL): ONCE A WEEK OR LESS
ACUTE_EXACERBATION_TODAY: NO
ACT_TOTALSCORE: 24
QUESTION_2 LAST FOUR WEEKS HOW OFTEN HAVE YOU HAD SHORTNESS OF BREATH: NOT AT ALL

## 2025-08-21 LAB — RENIN PLAS-CCNC: 5 NG/ML/HR

## 2025-08-24 LAB — ALDOST/RENIN PLAS-RTO: 1 {RATIO} (ref 0–25)
